# Patient Record
Sex: FEMALE | Race: WHITE | Employment: OTHER | URBAN - METROPOLITAN AREA
[De-identification: names, ages, dates, MRNs, and addresses within clinical notes are randomized per-mention and may not be internally consistent; named-entity substitution may affect disease eponyms.]

---

## 2017-11-23 ENCOUNTER — HOSPITAL ENCOUNTER (EMERGENCY)
Facility: CLINIC | Age: 57
Discharge: HOME | End: 2017-11-23
Attending: EMERGENCY MEDICINE
Payer: COMMERCIAL

## 2017-11-23 ENCOUNTER — APPOINTMENT (OUTPATIENT)
Dept: GENERAL RADIOLOGY | Facility: CLINIC | Age: 57
End: 2017-11-23
Payer: COMMERCIAL

## 2017-11-23 VITALS
BODY MASS INDEX: 27.2 KG/M2 | WEIGHT: 190 LBS | OXYGEN SATURATION: 95 % | SYSTOLIC BLOOD PRESSURE: 113 MMHG | DIASTOLIC BLOOD PRESSURE: 54 MMHG | HEIGHT: 70 IN | TEMPERATURE: 97.7 F | HEART RATE: 70 BPM | RESPIRATION RATE: 16 BRPM

## 2017-11-23 DIAGNOSIS — R07.9 CHEST PAIN, UNSPECIFIED TYPE: Primary | ICD-10-CM

## 2017-11-23 LAB
ABSOLUTE EOS #: 0.1 K/UL (ref 0–0.4)
ABSOLUTE IMMATURE GRANULOCYTE: ABNORMAL K/UL (ref 0–0.3)
ABSOLUTE LYMPH #: 0.9 K/UL (ref 1–4.8)
ABSOLUTE MONO #: 0.5 K/UL (ref 0.1–1.2)
ANION GAP SERPL CALCULATED.3IONS-SCNC: 11 MMOL/L (ref 9–17)
BASOPHILS # BLD: 1 % (ref 0–2)
BASOPHILS ABSOLUTE: 0 K/UL (ref 0–0.2)
BUN BLDV-MCNC: 11 MG/DL (ref 6–20)
BUN/CREAT BLD: ABNORMAL (ref 9–20)
CALCIUM SERPL-MCNC: 9.7 MG/DL (ref 8.6–10.4)
CHLORIDE BLD-SCNC: 104 MMOL/L (ref 98–107)
CO2: 23 MMOL/L (ref 20–31)
CREAT SERPL-MCNC: 0.6 MG/DL (ref 0.5–0.9)
D-DIMER QUANTITATIVE: 0.5 MG/L FEU
DIFFERENTIAL TYPE: ABNORMAL
EKG ATRIAL RATE: 73 BPM
EKG P AXIS: 78 DEGREES
EKG P-R INTERVAL: 138 MS
EKG Q-T INTERVAL: 392 MS
EKG QRS DURATION: 86 MS
EKG QTC CALCULATION (BAZETT): 431 MS
EKG R AXIS: 68 DEGREES
EKG T AXIS: 77 DEGREES
EKG VENTRICULAR RATE: 73 BPM
EOSINOPHILS RELATIVE PERCENT: 1 % (ref 1–4)
GFR AFRICAN AMERICAN: >60 ML/MIN
GFR NON-AFRICAN AMERICAN: >60 ML/MIN
GFR SERPL CREATININE-BSD FRML MDRD: ABNORMAL ML/MIN/{1.73_M2}
GFR SERPL CREATININE-BSD FRML MDRD: ABNORMAL ML/MIN/{1.73_M2}
GLUCOSE BLD-MCNC: 124 MG/DL (ref 70–99)
HCT VFR BLD CALC: 41 % (ref 36–46)
HEMOGLOBIN: 13.8 G/DL (ref 12–16)
IMMATURE GRANULOCYTES: ABNORMAL %
INR BLD: 0.9
LYMPHOCYTES # BLD: 15 % (ref 24–44)
MCH RBC QN AUTO: 32.6 PG (ref 26–34)
MCHC RBC AUTO-ENTMCNC: 33.7 G/DL (ref 31–37)
MCV RBC AUTO: 96.6 FL (ref 80–100)
MONOCYTES # BLD: 8 % (ref 2–11)
PARTIAL THROMBOPLASTIN TIME: 23.8 SEC (ref 21.3–31.3)
PDW BLD-RTO: 12.5 % (ref 12.5–15.4)
PLATELET # BLD: 176 K/UL (ref 140–450)
PLATELET ESTIMATE: ABNORMAL
PMV BLD AUTO: 9.1 FL (ref 6–12)
POTASSIUM SERPL-SCNC: 4.6 MMOL/L (ref 3.7–5.3)
PROTHROMBIN TIME: 9.1 SEC (ref 9.4–12.6)
RBC # BLD: 4.24 M/UL (ref 4–5.2)
RBC # BLD: ABNORMAL 10*6/UL
SEG NEUTROPHILS: 75 % (ref 36–66)
SEGMENTED NEUTROPHILS ABSOLUTE COUNT: 4.6 K/UL (ref 1.8–7.7)
SODIUM BLD-SCNC: 138 MMOL/L (ref 135–144)
TROPONIN INTERP: NORMAL
TROPONIN T: <0.03 NG/ML
WBC # BLD: 6.1 K/UL (ref 3.5–11)
WBC # BLD: ABNORMAL 10*3/UL

## 2017-11-23 PROCEDURE — 85610 PROTHROMBIN TIME: CPT

## 2017-11-23 PROCEDURE — 85730 THROMBOPLASTIN TIME PARTIAL: CPT

## 2017-11-23 PROCEDURE — 99285 EMERGENCY DEPT VISIT HI MDM: CPT

## 2017-11-23 PROCEDURE — 85025 COMPLETE CBC W/AUTO DIFF WBC: CPT

## 2017-11-23 PROCEDURE — 96372 THER/PROPH/DIAG INJ SC/IM: CPT

## 2017-11-23 PROCEDURE — 85379 FIBRIN DEGRADATION QUANT: CPT

## 2017-11-23 PROCEDURE — 36415 COLL VENOUS BLD VENIPUNCTURE: CPT

## 2017-11-23 PROCEDURE — 84484 ASSAY OF TROPONIN QUANT: CPT

## 2017-11-23 PROCEDURE — 96374 THER/PROPH/DIAG INJ IV PUSH: CPT

## 2017-11-23 PROCEDURE — 80048 BASIC METABOLIC PNL TOTAL CA: CPT

## 2017-11-23 PROCEDURE — 71010 XR CHEST PORTABLE: CPT

## 2017-11-23 PROCEDURE — 93005 ELECTROCARDIOGRAM TRACING: CPT

## 2017-11-23 PROCEDURE — 6360000002 HC RX W HCPCS: Performed by: EMERGENCY MEDICINE

## 2017-11-23 RX ORDER — KETOROLAC TROMETHAMINE 30 MG/ML
30 INJECTION, SOLUTION INTRAMUSCULAR; INTRAVENOUS ONCE
Status: COMPLETED | OUTPATIENT
Start: 2017-11-23 | End: 2017-11-23

## 2017-11-23 RX ADMIN — ENOXAPARIN SODIUM 90 MG: 100 INJECTION SUBCUTANEOUS at 13:59

## 2017-11-23 RX ADMIN — KETOROLAC TROMETHAMINE 30 MG: 30 INJECTION, SOLUTION INTRAMUSCULAR at 14:03

## 2017-11-23 ASSESSMENT — PAIN DESCRIPTION - PAIN TYPE: TYPE: ACUTE PAIN

## 2017-11-23 ASSESSMENT — PAIN SCALES - GENERAL
PAINLEVEL_OUTOF10: 7

## 2017-11-23 ASSESSMENT — PAIN DESCRIPTION - DESCRIPTORS: DESCRIPTORS: ACHING;PRESSURE

## 2017-11-23 ASSESSMENT — PAIN DESCRIPTION - ORIENTATION: ORIENTATION: LEFT

## 2017-11-23 ASSESSMENT — PAIN DESCRIPTION - LOCATION: LOCATION: CHEST

## 2017-11-23 NOTE — ED PROVIDER NOTES
[facility]  [address] [address]  Phone: [phone]      Pt Name: Israel Hidalgo  MRN: [id number]  [place] 1960  Date of evaluation: 11/23/2017      CHIEF COMPLAINT       Chief Complaint   Patient presents with    Chest Pain       HISTORY OF PRESENT ILLNESS    60-year-old female presents to the emergency department today complaining of acute onset of chest pain. Pain started in her back and radiated anteriorly to her left chest.  It was a sharp stabbing pain. Pain on a scale of 0-10 at its worst was a 7. It started acutely. She did report associated shortness of breath. She's been a little nauseated but that hasn't been assaulted related to the pain itself. No diaphoresis. She's never had pain or problems like this in the past.  She took 4 baby aspirin prior to arrival and by the time she arrives here, the pain is still present but improved. She denies any lower extremity edema or calf tenderness. No recent long car rides or plane trips anywhere. Patient does smoke and does not have any intention of quitting smoking. There is been no other evaluation or management of these symptoms right arrival.  Breathing makes the pain worse. Nothing is made better. Patient does report recently that she's come down with what she thinks is little bit of nasal drainage as well as a cough. REVIEW OF SYSTEMS     Review of Systems   All other systems reviewed and are negative. PAST MEDICAL HISTORY    has a past medical history of COPD (chronic obstructive pulmonary disease) ([address] and Depression. SURGICAL HISTORY      has a past surgical history that includes lumbar laminectomy (1991).     CURRENT MEDICATIONS       Current Discharge Medication List      CONTINUE these medications which have NOT CHANGED    Details   METFORMIN HCL PO Take by mouth      aspirin 81 MG tablet Take 81 mg by mouth daily      FLUoxetine (PROZAC) 20 MG capsule Take 80 mg by mouth daily      albuterol (PROVENTIL) (2.5 MG/3ML) 0.083% nebulizer solution Take 2.5 mg by nebulization every 6 hours as needed for Wheezing             ALLERGIES     is allergic to persantine [dipyridamole] and ceftin [cefuroxime axetil]. FAMILY HISTORY     has no family status information on file. family history is not on file. SOCIAL HISTORY      reports that she has been smoking Cigarettes. She has a 17.50 pack-year smoking history. She has never used smokeless tobacco. She reports that she does not drink alcohol or use drugs. PHYSICAL EXAM     INITIAL VITALS:  height is 5' 10\" (1.778 m) and weight is 86.2 kg (190 lb). Her oral temperature is 97.7 °F (36.5 °C). Her blood pressure is 113/54 (abnormal) and her pulse is 70. Her respiration is 16 and oxygen saturation is 95%. Physical Exam   Constitutional: She is oriented to person, place, and time. She appears well-developed and well-nourished. HENT:   Head: Normocephalic and atraumatic. Eyes: Conjunctivae are normal. Pupils are equal, round, and reactive to light. Neck: Normal range of motion. Neck supple. No tracheal deviation present. Cardiovascular: Normal rate, regular rhythm and intact distal pulses. Pulmonary/Chest: Effort normal and breath sounds normal. No respiratory distress. With slightly prolonged expiratory phase. Abdominal: Soft. Bowel sounds are normal. She exhibits no distension. There is no tenderness. Musculoskeletal: Normal range of motion. She exhibits no edema or tenderness. Lymphadenopathy:     She has no cervical adenopathy. Neurological: She is alert and oriented to person, place, and time. Skin: Skin is warm and dry. No rash noted. I did not undress this patient. Psychiatric: She has a normal mood and affect. Her behavior is normal. Thought content normal.   Vitals reviewed. DIFFERENTIAL DIAGNOSIS/ MDM:   Have initiated a cardiac workup on this patient.   I will also check a d-dimer to address the issue of pulmonary embolism and thoracic aortic dissection. DIAGNOSTIC RESULTS     EKG:  EKG as interpreted by myself as showing a normal sinus rhythm without any acute ST segment changes. Rate axis and intervals are all normal.    RADIOLOGY:   Xr Chest Portable    Result Date: 11/23/2017  EXAMINATION: SINGLE VIEW OF THE CHEST 11/23/2017 11:50 am COMPARISON: None. HISTORY: ORDERING SYSTEM PROVIDED HISTORY: cheat pain TECHNOLOGIST PROVIDED HISTORY: Reason for exam:->cheat pain Ordering Physician Provided Reason for Exam: Pt. C/o left sided chest pain and SOB. History of COPD. Acuity: Acute Type of Exam: Initial FINDINGS: The lungs are without acute focal process. There is no effusion or pneumothorax. The cardiomediastinal silhouette is without acute process. The osseous structures are without acute process. Mildly dilated loops of bowel are seen left upper quadrant. No acute process.  Mildly dilated loops of bowel in the left upper quadrant         LABS:  Results for orders placed or performed during the hospital encounter of 11/23/17   CBC auto differential   Result Value Ref Range    WBC 6.1 3.5 - 11.0 k/uL    RBC 4.24 4.0 - 5.2 m/uL    Hemoglobin 13.8 12.0 - 16.0 g/dL    Hematocrit 41.0 36 - 46 %    MCV 96.6 80 - 100 fL    MCH 32.6 26 - 34 pg    MCHC 33.7 31 - 37 g/dL    RDW 12.5 12.5 - 15.4 %    Platelets 344 311 - 970 k/uL    MPV 9.1 6.0 - 12.0 fL    Differential Type NOT REPORTED     Seg Neutrophils 75 (H) 36 - 66 %    Lymphocytes 15 (L) 24 - 44 %    Monocytes 8 2 - 11 %    Eosinophils % 1 1 - 4 %    Basophils 1 0 - 2 %    Immature Granulocytes NOT REPORTED 0 %    Segs Absolute 4.60 1.8 - 7.7 k/uL    Absolute Lymph # 0.90 (L) 1.0 - 4.8 k/uL    Absolute Mono # 0.50 0.1 - 1.2 k/uL    Absolute Eos # 0.10 0.0 - 0.4 k/uL    Basophils # 0.00 0.0 - 0.2 k/uL    Absolute Immature Granulocyte NOT REPORTED 0.00 - 0.30 k/uL    WBC Morphology NOT REPORTED     RBC Morphology NOT REPORTED     Platelet Estimate NOT REPORTED Basic metabolic panel   Result Value Ref Range    Glucose 124 (H) 70 - 99 mg/dL    BUN 11 6 - 20 mg/dL    CREATININE 0.60 0.50 - 0.90 mg/dL    Bun/Cre Ratio NOT REPORTED 9 - 20    Calcium 9.7 8.6 - 10.4 mg/dL    Sodium 138 135 - 144 mmol/L    Potassium 4.6 3.7 - 5.3 mmol/L    Chloride 104 98 - 107 mmol/L    CO2 23 20 - 31 mmol/L    Anion Gap 11 9 - 17 mmol/L    GFR Non-African American >60 >60 mL/min    GFR African American >60 >60 mL/min    GFR Comment          GFR Staging NOT REPORTED    APTT   Result Value Ref Range    PTT 23.8 21.3 - 31.3 sec   Troponin   Result Value Ref Range    Troponin T <0.03 <0.03 ng/mL    Troponin Interp         Protime-INR   Result Value Ref Range    Protime 9.1 (L) 9.4 - 12.6 sec    INR 0.9    D-Dimer, Quantitative   Result Value Ref Range    D-Dimer, Quant 0.50 mg/L FEU   EKG 12 lead   Result Value Ref Range    Ventricular Rate 73 BPM    Atrial Rate 73 BPM    P-R Interval 138 ms    QRS Duration 86 ms    Q-T Interval 392 ms    QTc Calculation (Bazett) 431 ms    P Axis 78 degrees    R Axis 68 degrees    T Axis 77 degrees       EMERGENCY DEPARTMENT COURSE:     The patient was given the following medications:  No orders of the defined types were placed in this encounter. Vitals:    Vitals:    11/23/17 1142 11/23/17 1146 11/23/17 1332 11/23/17 1333   BP: 133/75 131/62 (!) 113/54    Pulse: 76 79 69 70   Resp: 16      Temp: 97.7 °F (36.5 °C)      TempSrc: Oral      SpO2: 95%  94% 95%   Weight: 86.2 kg (190 lb)      Height: 5' 10\" (1.778 m)        -------------------------  BP: (!) 113/54, Temp: 97.7 °F (36.5 °C), Pulse: 70, Resp: 16      Re-evaluation Notes  I workup to this point is negative however she has a minimally positive d-dimer. I would like to get a CAT scan of her chest however our CT scanner is down. With this in mind I told her that I would like her to be admitted for serial EKGs and enzymes as well as a CAT scan of her chest to address the issue of pulmonary embolus. After discussing the risks and benefits of admission in the presence of 4 family members, patient states that she would like to leave. She thinks that this most likely is musculoskeletal.  I told her that certainly could be an occult be related to a recent viral illness however I cannot say for certain and there is still risk of a serious life-threatening event going on. She is able to reiterate the risks back to me. She is competent to make this decision and therefore she will sign out. She didn't told to return here immediately. I written a prescription for a CT chest to be done tomorrow. I told her accidental like to have it done today if possible. In the meantime I will anticoagulate her with Lovenox subcu. I stressed importance of close follow-up with her personal physician tomorrow. Patient will be discharged. She's been told to return here immediately with any recurrent symptoms. CONSULTS:  None    CRITICAL CARE:   None    PROCEDURES:  None    FINAL IMPRESSION      1.  Chest pain, unspecified type          DISPOSITION/PLAN   DISPOSITION Decision to Discharge    Condition on Disposition  Good    PATIENT REFERRED TO:  Fouzia Callejas CNP  [address] [place] [address] [address] [address]  [phone]    Schedule an appointment as soon as possible for a visit in 1 day        DISCHARGE MEDICATIONS:  Current Discharge Medication List          (Please note that portions of this note were completed with a voice recognition program.  Efforts were made to edit the dictations but occasionally words are mis-transcribed.)    Conner Blackman MD, F.A.C.E.P, F.A.A.E.M  Emergency Physician Attending         Conner Blackman MD  11/23/17 3274

## 2017-11-23 NOTE — ED NOTES
Pt informs RN that she prefers to go home at this time. Pt educated on risks per Dr Gigi Villa. Pt verbalized understanding.       Amada Villasenor RN  11/23/17 1691

## 2018-10-15 ENCOUNTER — APPOINTMENT (OUTPATIENT)
Dept: GENERAL RADIOLOGY | Facility: CLINIC | Age: 58
End: 2018-10-15
Payer: MEDICARE

## 2018-10-15 ENCOUNTER — HOSPITAL ENCOUNTER (EMERGENCY)
Facility: CLINIC | Age: 58
Discharge: HOME | End: 2018-10-15
Attending: EMERGENCY MEDICINE
Payer: MEDICARE

## 2018-10-15 VITALS
DIASTOLIC BLOOD PRESSURE: 95 MMHG | TEMPERATURE: 98.7 F | RESPIRATION RATE: 19 BRPM | BODY MASS INDEX: 28.93 KG/M2 | HEIGHT: 66 IN | SYSTOLIC BLOOD PRESSURE: 111 MMHG | HEART RATE: 84 BPM | WEIGHT: 180 LBS | OXYGEN SATURATION: 97 %

## 2018-10-15 DIAGNOSIS — J40 BRONCHITIS: Primary | ICD-10-CM

## 2018-10-15 PROCEDURE — 71046 X-RAY EXAM CHEST 2 VIEWS: CPT

## 2018-10-15 PROCEDURE — 99283 EMERGENCY DEPT VISIT LOW MDM: CPT

## 2018-10-15 RX ORDER — ALBUTEROL SULFATE 90 UG/1
2 AEROSOL, METERED RESPIRATORY (INHALATION) EVERY 6 HOURS PRN
Qty: 1 INHALER | Refills: 3 | Status: SHIPPED | OUTPATIENT
Start: 2018-10-15 | End: 2020-08-19 | Stop reason: SDUPTHER

## 2018-10-15 RX ORDER — PREDNISONE 10 MG/1
TABLET ORAL
Qty: 20 TABLET | Refills: 0 | Status: SHIPPED | OUTPATIENT
Start: 2018-10-15 | End: 2018-10-25

## 2018-10-15 RX ORDER — AZITHROMYCIN 250 MG/1
TABLET, FILM COATED ORAL
Qty: 1 PACKET | Refills: 0 | Status: SHIPPED | OUTPATIENT
Start: 2018-10-15 | End: 2018-10-25

## 2018-10-15 ASSESSMENT — ENCOUNTER SYMPTOMS
DIARRHEA: 0
VOMITING: 0
COUGH: 1
SHORTNESS OF BREATH: 0

## 2019-03-28 ENCOUNTER — HOSPITAL ENCOUNTER (INPATIENT)
Age: 59
LOS: 2 days | Discharge: HOME | DRG: 194 | End: 2019-03-30
Attending: EMERGENCY MEDICINE | Admitting: INTERNAL MEDICINE
Payer: MEDICARE

## 2019-03-28 ENCOUNTER — APPOINTMENT (OUTPATIENT)
Dept: GENERAL RADIOLOGY | Facility: CLINIC | Age: 59
DRG: 194 | End: 2019-03-28
Payer: MEDICARE

## 2019-03-28 DIAGNOSIS — J18.9 PNEUMONIA DUE TO ORGANISM: Primary | ICD-10-CM

## 2019-03-28 LAB
ABSOLUTE EOS #: 0 K/UL (ref 0–0.4)
ABSOLUTE IMMATURE GRANULOCYTE: ABNORMAL K/UL (ref 0–0.3)
ABSOLUTE LYMPH #: 1.2 K/UL (ref 1–4.8)
ABSOLUTE MONO #: 0.6 K/UL (ref 0.1–1.2)
ANION GAP SERPL CALCULATED.3IONS-SCNC: 14 MMOL/L (ref 9–17)
BASOPHILS # BLD: 1 % (ref 0–2)
BASOPHILS ABSOLUTE: 0 K/UL (ref 0–0.2)
BUN BLDV-MCNC: 5 MG/DL (ref 6–20)
BUN/CREAT BLD: ABNORMAL (ref 9–20)
CALCIUM SERPL-MCNC: 10.6 MG/DL (ref 8.6–10.4)
CHLORIDE BLD-SCNC: 100 MMOL/L (ref 98–107)
CO2: 26 MMOL/L (ref 20–31)
CREAT SERPL-MCNC: 0.7 MG/DL (ref 0.5–0.9)
DIFFERENTIAL TYPE: ABNORMAL
DIRECT EXAM: NORMAL
EOSINOPHILS RELATIVE PERCENT: 0 % (ref 1–4)
GFR AFRICAN AMERICAN: >60 ML/MIN
GFR NON-AFRICAN AMERICAN: >60 ML/MIN
GFR SERPL CREATININE-BSD FRML MDRD: ABNORMAL ML/MIN/{1.73_M2}
GFR SERPL CREATININE-BSD FRML MDRD: ABNORMAL ML/MIN/{1.73_M2}
GLUCOSE BLD-MCNC: 133 MG/DL (ref 70–99)
GLUCOSE BLD-MCNC: 182 MG/DL (ref 65–105)
HCT VFR BLD CALC: 33.8 % (ref 36–46)
HEMOGLOBIN: 11.4 G/DL (ref 12–16)
IMMATURE GRANULOCYTES: ABNORMAL %
LACTIC ACID: 1.2 MMOL/L (ref 0.5–2.2)
LYMPHOCYTES # BLD: 19 % (ref 24–44)
Lab: NORMAL
MAGNESIUM: 1.8 MG/DL (ref 1.6–2.6)
MCH RBC QN AUTO: 32.1 PG (ref 26–34)
MCHC RBC AUTO-ENTMCNC: 33.7 G/DL (ref 31–37)
MCV RBC AUTO: 95.2 FL (ref 80–100)
MONOCYTES # BLD: 9 % (ref 2–11)
NRBC AUTOMATED: ABNORMAL PER 100 WBC
PDW BLD-RTO: 12.7 % (ref 12.5–15.4)
PLATELET # BLD: 297 K/UL (ref 140–450)
PLATELET ESTIMATE: ABNORMAL
PMV BLD AUTO: 8.3 FL (ref 6–12)
POTASSIUM SERPL-SCNC: 3.5 MMOL/L (ref 3.7–5.3)
RBC # BLD: 3.55 M/UL (ref 4–5.2)
RBC # BLD: ABNORMAL 10*6/UL
SEG NEUTROPHILS: 71 % (ref 36–66)
SEGMENTED NEUTROPHILS ABSOLUTE COUNT: 4.6 K/UL (ref 1.8–7.7)
SODIUM BLD-SCNC: 140 MMOL/L (ref 135–144)
SPECIMEN DESCRIPTION: NORMAL
WBC # BLD: 6.5 K/UL (ref 3.5–11)
WBC # BLD: ABNORMAL 10*3/UL

## 2019-03-28 PROCEDURE — 87040 BLOOD CULTURE FOR BACTERIA: CPT

## 2019-03-28 PROCEDURE — 6360000002 HC RX W HCPCS: Performed by: EMERGENCY MEDICINE

## 2019-03-28 PROCEDURE — 71046 X-RAY EXAM CHEST 2 VIEWS: CPT

## 2019-03-28 PROCEDURE — 85025 COMPLETE CBC W/AUTO DIFF WBC: CPT

## 2019-03-28 PROCEDURE — 6370000000 HC RX 637 (ALT 250 FOR IP): Performed by: EMERGENCY MEDICINE

## 2019-03-28 PROCEDURE — 83735 ASSAY OF MAGNESIUM: CPT

## 2019-03-28 PROCEDURE — 99222 1ST HOSP IP/OBS MODERATE 55: CPT | Performed by: NURSE PRACTITIONER

## 2019-03-28 PROCEDURE — 6360000002 HC RX W HCPCS

## 2019-03-28 PROCEDURE — 82947 ASSAY GLUCOSE BLOOD QUANT: CPT

## 2019-03-28 PROCEDURE — 99285 EMERGENCY DEPT VISIT HI MDM: CPT

## 2019-03-28 PROCEDURE — 87804 INFLUENZA ASSAY W/OPTIC: CPT

## 2019-03-28 PROCEDURE — 83605 ASSAY OF LACTIC ACID: CPT

## 2019-03-28 PROCEDURE — 1200000000 HC SEMI PRIVATE

## 2019-03-28 PROCEDURE — 80048 BASIC METABOLIC PNL TOTAL CA: CPT

## 2019-03-28 PROCEDURE — 36415 COLL VENOUS BLD VENIPUNCTURE: CPT

## 2019-03-28 RX ORDER — BENZONATATE 100 MG/1
100 CAPSULE ORAL ONCE
Status: COMPLETED | OUTPATIENT
Start: 2019-03-28 | End: 2019-03-28

## 2019-03-28 RX ORDER — ONDANSETRON 2 MG/ML
4 INJECTION INTRAMUSCULAR; INTRAVENOUS EVERY 6 HOURS PRN
Status: DISCONTINUED | OUTPATIENT
Start: 2019-03-28 | End: 2019-03-30 | Stop reason: HOSPADM

## 2019-03-28 RX ORDER — ONDANSETRON 2 MG/ML
4 INJECTION INTRAMUSCULAR; INTRAVENOUS EVERY 6 HOURS PRN
Status: DISCONTINUED | OUTPATIENT
Start: 2019-03-28 | End: 2019-03-28 | Stop reason: SDUPTHER

## 2019-03-28 RX ORDER — NICOTINE POLACRILEX 4 MG
15 LOZENGE BUCCAL PRN
Status: DISCONTINUED | OUTPATIENT
Start: 2019-03-28 | End: 2019-03-30 | Stop reason: HOSPADM

## 2019-03-28 RX ORDER — SODIUM CHLORIDE 0.9 % (FLUSH) 0.9 %
10 SYRINGE (ML) INJECTION PRN
Status: DISCONTINUED | OUTPATIENT
Start: 2019-03-28 | End: 2019-03-30 | Stop reason: HOSPADM

## 2019-03-28 RX ORDER — PREDNISONE 20 MG/1
60 TABLET ORAL ONCE
Status: COMPLETED | OUTPATIENT
Start: 2019-03-28 | End: 2019-03-28

## 2019-03-28 RX ORDER — DEXTROSE MONOHYDRATE 25 G/50ML
12.5 INJECTION, SOLUTION INTRAVENOUS PRN
Status: DISCONTINUED | OUTPATIENT
Start: 2019-03-28 | End: 2019-03-30 | Stop reason: HOSPADM

## 2019-03-28 RX ORDER — PREDNISONE 20 MG/1
40 TABLET ORAL DAILY
Status: DISCONTINUED | OUTPATIENT
Start: 2019-03-31 | End: 2019-03-30 | Stop reason: HOSPADM

## 2019-03-28 RX ORDER — ONDANSETRON 4 MG/1
4 TABLET, ORALLY DISINTEGRATING ORAL EVERY 6 HOURS PRN
Status: DISCONTINUED | OUTPATIENT
Start: 2019-03-28 | End: 2019-03-30 | Stop reason: HOSPADM

## 2019-03-28 RX ORDER — FAMOTIDINE 20 MG/1
20 TABLET, FILM COATED ORAL 2 TIMES DAILY
Status: DISCONTINUED | OUTPATIENT
Start: 2019-03-28 | End: 2019-03-30 | Stop reason: HOSPADM

## 2019-03-28 RX ORDER — DEXTROSE MONOHYDRATE 50 MG/ML
100 INJECTION, SOLUTION INTRAVENOUS PRN
Status: DISCONTINUED | OUTPATIENT
Start: 2019-03-28 | End: 2019-03-30 | Stop reason: HOSPADM

## 2019-03-28 RX ORDER — ALBUTEROL SULFATE 2.5 MG/3ML
2.5 SOLUTION RESPIRATORY (INHALATION) ONCE
Status: COMPLETED | OUTPATIENT
Start: 2019-03-28 | End: 2019-03-28

## 2019-03-28 RX ORDER — ALBUTEROL SULFATE 2.5 MG/3ML
2.5 SOLUTION RESPIRATORY (INHALATION)
Status: DISCONTINUED | OUTPATIENT
Start: 2019-03-28 | End: 2019-03-30 | Stop reason: HOSPADM

## 2019-03-28 RX ORDER — FLUOXETINE HYDROCHLORIDE 20 MG/1
80 CAPSULE ORAL DAILY
Status: DISCONTINUED | OUTPATIENT
Start: 2019-03-29 | End: 2019-03-30 | Stop reason: HOSPADM

## 2019-03-28 RX ORDER — ACETAMINOPHEN 325 MG/1
650 TABLET ORAL EVERY 4 HOURS PRN
Status: DISCONTINUED | OUTPATIENT
Start: 2019-03-28 | End: 2019-03-30 | Stop reason: HOSPADM

## 2019-03-28 RX ORDER — SODIUM CHLORIDE 0.9 % (FLUSH) 0.9 %
10 SYRINGE (ML) INJECTION EVERY 12 HOURS SCHEDULED
Status: DISCONTINUED | OUTPATIENT
Start: 2019-03-28 | End: 2019-03-30 | Stop reason: HOSPADM

## 2019-03-28 RX ORDER — LEVOFLOXACIN 5 MG/ML
500 INJECTION, SOLUTION INTRAVENOUS ONCE
Status: COMPLETED | OUTPATIENT
Start: 2019-03-28 | End: 2019-03-28

## 2019-03-28 RX ORDER — ASPIRIN 81 MG/1
81 TABLET ORAL DAILY
Status: DISCONTINUED | OUTPATIENT
Start: 2019-03-29 | End: 2019-03-30 | Stop reason: HOSPADM

## 2019-03-28 RX ORDER — METHYLPREDNISOLONE SODIUM SUCCINATE 40 MG/ML
40 INJECTION, POWDER, LYOPHILIZED, FOR SOLUTION INTRAMUSCULAR; INTRAVENOUS EVERY 6 HOURS
Status: DISCONTINUED | OUTPATIENT
Start: 2019-03-29 | End: 2019-03-30 | Stop reason: HOSPADM

## 2019-03-28 RX ORDER — IPRATROPIUM BROMIDE AND ALBUTEROL SULFATE 2.5; .5 MG/3ML; MG/3ML
1 SOLUTION RESPIRATORY (INHALATION)
Status: DISCONTINUED | OUTPATIENT
Start: 2019-03-29 | End: 2019-03-30 | Stop reason: HOSPADM

## 2019-03-28 RX ORDER — LEVOFLOXACIN 5 MG/ML
500 INJECTION, SOLUTION INTRAVENOUS EVERY 24 HOURS
Status: DISCONTINUED | OUTPATIENT
Start: 2019-03-29 | End: 2019-03-30 | Stop reason: HOSPADM

## 2019-03-28 RX ORDER — IPRATROPIUM BROMIDE AND ALBUTEROL SULFATE 2.5; .5 MG/3ML; MG/3ML
1 SOLUTION RESPIRATORY (INHALATION) ONCE
Status: COMPLETED | OUTPATIENT
Start: 2019-03-28 | End: 2019-03-28

## 2019-03-28 RX ORDER — SODIUM CHLORIDE 9 MG/ML
INJECTION, SOLUTION INTRAVENOUS CONTINUOUS
Status: DISCONTINUED | OUTPATIENT
Start: 2019-03-28 | End: 2019-03-30 | Stop reason: HOSPADM

## 2019-03-28 RX ORDER — ALBUTEROL SULFATE 2.5 MG/3ML
SOLUTION RESPIRATORY (INHALATION)
Status: COMPLETED
Start: 2019-03-28 | End: 2019-03-28

## 2019-03-28 RX ORDER — NICOTINE 21 MG/24HR
1 PATCH, TRANSDERMAL 24 HOURS TRANSDERMAL DAILY PRN
Status: DISCONTINUED | OUTPATIENT
Start: 2019-03-28 | End: 2019-03-30 | Stop reason: HOSPADM

## 2019-03-28 RX ADMIN — PREDNISONE 60 MG: 20 TABLET ORAL at 18:43

## 2019-03-28 RX ADMIN — BENZONATATE 100 MG: 100 CAPSULE ORAL at 21:26

## 2019-03-28 RX ADMIN — ALBUTEROL SULFATE 2.5 MG: 2.5 SOLUTION RESPIRATORY (INHALATION) at 19:15

## 2019-03-28 RX ADMIN — LEVOFLOXACIN 500 MG: 5 INJECTION, SOLUTION INTRAVENOUS at 20:04

## 2019-03-28 RX ADMIN — IPRATROPIUM BROMIDE AND ALBUTEROL SULFATE 1 AMPULE: .5; 3 SOLUTION RESPIRATORY (INHALATION) at 18:43

## 2019-03-28 NOTE — ED PROVIDER NOTES
ATTENDING  ADDENDUM     Care of this patient was assumed from Dr. Gloria Mei. The patient was seen for Cough; Shortness of Breath; and Fatigue  . The patient's initial evaluation and plan have been discussed with the prior provider who initially evaluated the patient. Nursing Notes, Past Medical Hx, Past Surgical Hx, Social Hx, Allergies, and Family Hx were all reviewed. ED COURSE      The patient was given the following medications:  Orders Placed This Encounter   Medications    ipratropium-albuterol (DUONEB) nebulizer solution 1 ampule    predniSONE (DELTASONE) tablet 60 mg    albuterol (PROVENTIL) (2.5 MG/3ML) 0.083% nebulizer solution     Fouzia Lown: cabinet override    albuterol (PROVENTIL) nebulizer solution 2.5 mg    levofloxacin (LEVAQUIN) 500 MG/100ML infusion 500 mg       RECENT VITALS:   Temp: 98.2 °F (36.8 °C), Pulse: 79, Resp: 20, BP: (!) 192/76    MEDICAL DECISION MAKING       Xr Chest Standard (2 Vw)    Result Date: 3/28/2019  EXAMINATION: TWO VIEWS OF THE CHEST 3/28/2019 6:55 pm COMPARISON: October 2018 HISTORY: ORDERING SYSTEM PROVIDED HISTORY: cough TECHNOLOGIST PROVIDED HISTORY: cough Ordering Physician Provided Reason for Exam: cough Acuity: Acute Type of Exam: Initial FINDINGS: Heart size is stable. Ill-defined increased density in the left lung base is noted. There is no focal consolidation. There is no pneumothorax. There is no effusion. Ill-defined increased density left lung base suggesting developing pneumonia     Results for orders placed or performed during the hospital encounter of 03/28/19   Rapid Influenza A/B Antigens   Result Value Ref Range    Specimen Description . NASOPHARYNGEAL SWAB     Special Requests NOT REPORTED     Direct Exam       PRESUMPTIVE NEGATIVE for Influenza A + B antigens. PCR testing to confirm this result is available upon request.  Specimen will be saved in the laboratory for 7 days.   Please call [facility].    8:06 PM  I have discussed this patient with the hospitalist who was kind enough to admit this patient. Impression:  1.  Pneumonia due to organism      Condition:  [address] Mat[address] English MD, Mary Alice Johansen  Emergency Medicine Attending        Clare Preston MD  03/28/19 Ghanshyam Preston MD  03/28/19 2031

## 2019-03-28 NOTE — ED NOTES
Pt arrives to ER with c/o cough, shortness of breath, and nasal congestion for 11 days. Her sister is with her, who states that her family has been diagnosed with Influenza A. Harsh cough noted. Pt c/o sore throat. She states that she is short of breath with exertion. Pt speaks in 5-6 word sentences after exertion and full sentences at rest.  Pt resting in bed, warm blankets provided. Denies needs at this time, no s/s of distress.       Rajni Okeefe, RN  03/28/19 Alphonse Charles, LUIS MANUEL  03/28/19 2577

## 2019-03-28 NOTE — ED NOTES
[facility] called by dr. Valorie Campbell for admission to [facility]. Nany Ibarra RN  03/28/19 1955

## 2019-03-28 NOTE — ED NOTES
ROOM AIR O 2 88%-89% , PT PLACED BACK ON NASAL CANNULA AT 2 LPM O2 SAT 92% WITH NC.       Seng Tejeda RN  03/28/19 4132

## 2019-03-28 NOTE — ED PROVIDER NOTES
[facility]  eMERGENCY dEPARTMENT eNCOUnter      Pt Name: Amrit Martinez  MRN: [id number]  [place] 1960  Date of evaluation: 3/28/2019      CHIEF COMPLAINT       Chief Complaint   Patient presents with    Cough    Shortness of Breath    Fatigue         HISTORY OF PRESENT ILLNESS      The patient presents with about 10 days of cough and shortness of breath. She started out with fever and URI symptoms. She has been exposed to family members with influenza A. The patient has a history of COPD. She has been using her inhaler but hasn't used it for a couple days. She is not currently on steroids. She has a cough productive of green sputum. She's not sure that she had a fever recently, but had one before. Sometimes she coughs so hard that she has emesis. She's had a little diarrhea but that has gotten better. She has lost some weight, but she is able to drink fluids. She reports a decreased appetite. The patient denies chest pain. REVIEW OF SYSTEMS       All systems reviewed and negative unless noted in HPI. The patient thinks she had a fever. Denies vision change. Recent sore throat and rhinorrhea. Denies any neck pain or stiffness. Cough with green sputum. Wheezing. History of emphysema. Does not use home oxygen. Post-tussive emesis. Diarrhea recently. Denies any dysuria. Denies urinary frequency or hematuria. Denies musculoskeletal injury or pain. Denies any weakness, numbness or focal neurologic deficit. Denies any skin rash or edema. No recent psychiatric issues. No easy bruising or bleeding. Denies any polyuria, polydypsia or history of immunocompromise. PAST MEDICAL HISTORY    has a past medical history of COPD (chronic obstructive pulmonary disease) ([address] and Depression. SURGICAL HISTORY      has a past surgical history that includes lumbar laminectomy (1991).     CURRENT MEDICATIONS       Previous Medications    ALBUTEROL SULFATE HFA (VENTOLIN HFA) 108 (90 BASE) MCG/ACT INHALER    Inhale 2 puffs into the lungs every 6 hours as needed for Wheezing    ASPIRIN 81 MG TABLET    Take 81 mg by mouth daily    FLUOXETINE (PROZAC) 20 MG CAPSULE    Take 80 mg by mouth daily    METFORMIN HCL PO    Take by mouth       ALLERGIES     is allergic to persantine [dipyridamole]; ceftin [cefuroxime axetil]; and cephalosporins. FAMILY HISTORY     has no family status information on file. family history is not on file. SOCIAL HISTORY      reports that she has been smoking cigarettes. She has a 17.50 pack-year smoking history. She has never used smokeless tobacco. She reports that she does not drink alcohol or use drugs. PHYSICAL EXAM     INITIAL VITALS:  height is 5' 10\" (1.778 m) and weight is 81.6 kg (180 lb). Her oral temperature is 98.2 °F (36.8 °C). Her blood pressure is 130/56 (abnormal) and her pulse is 72. Her respiration is 20 and oxygen saturation is 96%. Patient is alert and oriented, in no apparent distress. HEENT is atraumatic. Pupils are PERRL at 4 mm. Mucous membranes moist.    Neck is supple with no lymphadenopathy. No JVD. No meningismus. Heart sounds regular rate and rhythm with no gallops, murmurs, or rubs. Lungs:  Wheezing in all fields. Abdomen: soft, nontender with no pain to palpation. Normal bowel sounds are noted. No rebound or guarding. Musculoskeletal exam shows no evidence of trauma. Skin: no rash or edema. Neurological exam reveals cranial nerves 2 through 12 grossly intact. Patient has equal  and normal deep tendon reflexes. Psychiatric: appropriate. Lymphatics.:  No lymphadenopathy.        DIFFERENTIAL DIAGNOSIS/ MDM:     COPD exacerbation, pneumonia, influenza A    DIAGNOSTIC RESULTS         RADIOLOGY:   I directly visualized the following  images and reviewed the radiologist interpretations:  XR CHEST STANDARD (2 VW)    (Results Pending)       LABS:  Results for orders placed or performed during the hospital encounter of 03/28/19   Rapid Influenza A/B Antigens   Result Value Ref Range    Specimen Description . NASOPHARYNGEAL SWAB     Special Requests NOT REPORTED     Direct Exam       PRESUMPTIVE NEGATIVE for Influenza A + B antigens. PCR testing to confirm this result is available upon request.  Specimen will be saved in the laboratory for 7 days. Please call [phone] if PCR testing is indicated. EMERGENCY DEPARTMENT COURSE:   Vitals:    Vitals:    03/28/19 1826 03/28/19 1854   BP: (!) 130/56    Pulse: 72    Resp: 20 20   Temp: 98.2 °F (36.8 °C)    TempSrc: Oral    SpO2: 94% 96%   Weight: 81.6 kg (180 lb)    Height: 5' 10\" (1.778 m)      -------------------------  BP: (!) 130/56, Temp: 98.2 °F (36.8 °C), Pulse: 72, Resp: 20      Re-evaluation Notes    The patient received steroids and a breathing treatment in the emergency department. The patient will be endorsed to Dr. Chinyere Hilton secondary to end of shift. Please refer to his documentation. FINAL IMPRESSION    No diagnosis found. DISPOSITION/PLAN   DISPOSITION        Condition on Disposition    Stable    PATIENT REFERRED TO:  No follow-up provider specified.     DISCHARGE MEDICATIONS:  New Prescriptions    No medications on file       (Please note that portions of this note were completed with a voice recognition program.  Efforts were made to edit the dictations but occasionally words are mis-transcribed.)    Randolph MD   Attending Emergency Physician       Yany Bartlett MD  03/28/19 6353

## 2019-03-29 ENCOUNTER — APPOINTMENT (OUTPATIENT)
Dept: CT IMAGING | Age: 59
DRG: 194 | End: 2019-03-29
Payer: MEDICARE

## 2019-03-29 ENCOUNTER — APPOINTMENT (OUTPATIENT)
Dept: GENERAL RADIOLOGY | Age: 59
DRG: 194 | End: 2019-03-29
Payer: MEDICARE

## 2019-03-29 PROBLEM — E83.52 HYPERCALCEMIA: Status: ACTIVE | Noted: 2019-03-29

## 2019-03-29 PROBLEM — J18.9 PNEUMONIA: Status: RESOLVED | Noted: 2019-03-28 | Resolved: 2019-03-29

## 2019-03-29 LAB
ADENOVIRUS PCR: NOT DETECTED
ANION GAP SERPL CALCULATED.3IONS-SCNC: 13 MMOL/L (ref 9–17)
BORDETELLA PERTUSSIS PCR: NOT DETECTED
BUN BLDV-MCNC: 8 MG/DL (ref 6–20)
BUN/CREAT BLD: 14 (ref 9–20)
CALCIUM SERPL-MCNC: 10.6 MG/DL (ref 8.6–10.4)
CHLAMYDIA PNEUMONIAE BY PCR: NOT DETECTED
CHLORIDE BLD-SCNC: 99 MMOL/L (ref 98–107)
CO2: 24 MMOL/L (ref 20–31)
CORONAVIRUS 229E PCR: NOT DETECTED
CORONAVIRUS HKU1 PCR: NOT DETECTED
CORONAVIRUS NL63 PCR: NOT DETECTED
CORONAVIRUS OC43 PCR: NOT DETECTED
CREAT SERPL-MCNC: 0.58 MG/DL (ref 0.5–0.9)
ESTIMATED AVERAGE GLUCOSE: 128 MG/DL
GFR AFRICAN AMERICAN: >60 ML/MIN
GFR NON-AFRICAN AMERICAN: >60 ML/MIN
GFR SERPL CREATININE-BSD FRML MDRD: ABNORMAL ML/MIN/{1.73_M2}
GFR SERPL CREATININE-BSD FRML MDRD: ABNORMAL ML/MIN/{1.73_M2}
GLUCOSE BLD-MCNC: 175 MG/DL (ref 65–105)
GLUCOSE BLD-MCNC: 204 MG/DL (ref 70–99)
GLUCOSE BLD-MCNC: 233 MG/DL (ref 65–105)
GLUCOSE BLD-MCNC: 258 MG/DL (ref 65–105)
GLUCOSE BLD-MCNC: 264 MG/DL (ref 65–105)
HBA1C MFR BLD: 6.1 % (ref 4–6)
HCT VFR BLD CALC: 35.9 % (ref 36–46)
HEMOGLOBIN: 12 G/DL (ref 12–16)
HUMAN METAPNEUMOVIRUS PCR: NOT DETECTED
INFLUENZA A BY PCR: NOT DETECTED
INFLUENZA A H1 (2009) PCR: NORMAL
INFLUENZA A H1 PCR: NORMAL
INFLUENZA A H3 PCR: NORMAL
INFLUENZA B BY PCR: NOT DETECTED
MCH RBC QN AUTO: 32 PG (ref 26–34)
MCHC RBC AUTO-ENTMCNC: 33.4 G/DL (ref 31–37)
MCV RBC AUTO: 95.8 FL (ref 80–100)
MYCOPLASMA PNEUMONIAE PCR: NOT DETECTED
NRBC AUTOMATED: ABNORMAL PER 100 WBC
PARAINFLUENZA 1 PCR: NOT DETECTED
PARAINFLUENZA 2 PCR: NOT DETECTED
PARAINFLUENZA 3 PCR: NOT DETECTED
PARAINFLUENZA 4 PCR: NOT DETECTED
PDW BLD-RTO: 12.7 % (ref 11.5–14.5)
PLATELET # BLD: 313 K/UL (ref 130–400)
PMV BLD AUTO: 8.9 FL (ref 6–12)
POTASSIUM SERPL-SCNC: 4.5 MMOL/L (ref 3.7–5.3)
RBC # BLD: 3.75 M/UL (ref 4–5.2)
RESP SYNCYTIAL VIRUS PCR: NOT DETECTED
RHINO/ENTEROVIRUS PCR: NOT DETECTED
SODIUM BLD-SCNC: 136 MMOL/L (ref 135–144)
SPECIMEN DESCRIPTION: NORMAL
WBC # BLD: 5.6 K/UL (ref 3.5–11)

## 2019-03-29 PROCEDURE — 87633 RESP VIRUS 12-25 TARGETS: CPT

## 2019-03-29 PROCEDURE — 6370000000 HC RX 637 (ALT 250 FOR IP): Performed by: NURSE PRACTITIONER

## 2019-03-29 PROCEDURE — 97116 GAIT TRAINING THERAPY: CPT

## 2019-03-29 PROCEDURE — 71260 CT THORAX DX C+: CPT

## 2019-03-29 PROCEDURE — 71045 X-RAY EXAM CHEST 1 VIEW: CPT

## 2019-03-29 PROCEDURE — 6360000004 HC RX CONTRAST MEDICATION: Performed by: INTERNAL MEDICINE

## 2019-03-29 PROCEDURE — 36415 COLL VENOUS BLD VENIPUNCTURE: CPT

## 2019-03-29 PROCEDURE — 83036 HEMOGLOBIN GLYCOSYLATED A1C: CPT

## 2019-03-29 PROCEDURE — 2580000003 HC RX 258: Performed by: INTERNAL MEDICINE

## 2019-03-29 PROCEDURE — 87486 CHLMYD PNEUM DNA AMP PROBE: CPT

## 2019-03-29 PROCEDURE — 87798 DETECT AGENT NOS DNA AMP: CPT

## 2019-03-29 PROCEDURE — 82947 ASSAY GLUCOSE BLOOD QUANT: CPT

## 2019-03-29 PROCEDURE — 97530 THERAPEUTIC ACTIVITIES: CPT

## 2019-03-29 PROCEDURE — 1200000000 HC SEMI PRIVATE

## 2019-03-29 PROCEDURE — 85027 COMPLETE CBC AUTOMATED: CPT

## 2019-03-29 PROCEDURE — 80048 BASIC METABOLIC PNL TOTAL CA: CPT

## 2019-03-29 PROCEDURE — 6360000002 HC RX W HCPCS: Performed by: NURSE PRACTITIONER

## 2019-03-29 PROCEDURE — 94640 AIRWAY INHALATION TREATMENT: CPT

## 2019-03-29 PROCEDURE — 97161 PT EVAL LOW COMPLEX 20 MIN: CPT

## 2019-03-29 PROCEDURE — 2580000003 HC RX 258: Performed by: NURSE PRACTITIONER

## 2019-03-29 PROCEDURE — 99232 SBSQ HOSP IP/OBS MODERATE 35: CPT | Performed by: INTERNAL MEDICINE

## 2019-03-29 PROCEDURE — 87581 M.PNEUMON DNA AMP PROBE: CPT

## 2019-03-29 RX ORDER — SODIUM CHLORIDE 0.9 % (FLUSH) 0.9 %
10 SYRINGE (ML) INJECTION
Status: COMPLETED | OUTPATIENT
Start: 2019-03-29 | End: 2019-03-29

## 2019-03-29 RX ORDER — 0.9 % SODIUM CHLORIDE 0.9 %
80 INTRAVENOUS SOLUTION INTRAVENOUS ONCE
Status: COMPLETED | OUTPATIENT
Start: 2019-03-29 | End: 2019-03-29

## 2019-03-29 RX ADMIN — SODIUM CHLORIDE: 9 INJECTION, SOLUTION INTRAVENOUS at 13:00

## 2019-03-29 RX ADMIN — METHYLPREDNISOLONE SODIUM SUCCINATE 40 MG: 40 INJECTION, POWDER, FOR SOLUTION INTRAMUSCULAR; INTRAVENOUS at 06:00

## 2019-03-29 RX ADMIN — IPRATROPIUM BROMIDE AND ALBUTEROL SULFATE 1 AMPULE: .5; 3 SOLUTION RESPIRATORY (INHALATION) at 11:06

## 2019-03-29 RX ADMIN — IPRATROPIUM BROMIDE AND ALBUTEROL SULFATE 1 AMPULE: .5; 3 SOLUTION RESPIRATORY (INHALATION) at 18:28

## 2019-03-29 RX ADMIN — SODIUM CHLORIDE 80 ML: 0.9 INJECTION, SOLUTION INTRAVENOUS at 17:23

## 2019-03-29 RX ADMIN — INSULIN LISPRO 2 UNITS: 100 INJECTION, SOLUTION INTRAVENOUS; SUBCUTANEOUS at 08:18

## 2019-03-29 RX ADMIN — INSULIN LISPRO 1 UNITS: 100 INJECTION, SOLUTION INTRAVENOUS; SUBCUTANEOUS at 22:32

## 2019-03-29 RX ADMIN — INSULIN LISPRO 3 UNITS: 100 INJECTION, SOLUTION INTRAVENOUS; SUBCUTANEOUS at 11:19

## 2019-03-29 RX ADMIN — IOPAMIDOL 75 ML: 755 INJECTION, SOLUTION INTRAVENOUS at 17:22

## 2019-03-29 RX ADMIN — INSULIN LISPRO 3 UNITS: 100 INJECTION, SOLUTION INTRAVENOUS; SUBCUTANEOUS at 17:23

## 2019-03-29 RX ADMIN — FLUOXETINE 80 MG: 20 CAPSULE ORAL at 08:18

## 2019-03-29 RX ADMIN — Medication 10 ML: at 00:25

## 2019-03-29 RX ADMIN — IPRATROPIUM BROMIDE AND ALBUTEROL SULFATE 1 AMPULE: .5; 3 SOLUTION RESPIRATORY (INHALATION) at 14:51

## 2019-03-29 RX ADMIN — METHYLPREDNISOLONE SODIUM SUCCINATE 40 MG: 40 INJECTION, POWDER, FOR SOLUTION INTRAMUSCULAR; INTRAVENOUS at 11:19

## 2019-03-29 RX ADMIN — INSULIN LISPRO 1 UNITS: 100 INJECTION, SOLUTION INTRAVENOUS; SUBCUTANEOUS at 00:26

## 2019-03-29 RX ADMIN — LEVOFLOXACIN 500 MG: 5 INJECTION, SOLUTION INTRAVENOUS at 21:03

## 2019-03-29 RX ADMIN — FAMOTIDINE 20 MG: 20 TABLET ORAL at 08:18

## 2019-03-29 RX ADMIN — SODIUM CHLORIDE: 9 INJECTION, SOLUTION INTRAVENOUS at 00:26

## 2019-03-29 RX ADMIN — IPRATROPIUM BROMIDE AND ALBUTEROL SULFATE 1 AMPULE: .5; 3 SOLUTION RESPIRATORY (INHALATION) at 06:11

## 2019-03-29 RX ADMIN — ENOXAPARIN SODIUM 40 MG: 40 INJECTION SUBCUTANEOUS at 08:18

## 2019-03-29 RX ADMIN — ASPIRIN 81 MG: 81 TABLET, COATED ORAL at 08:18

## 2019-03-29 RX ADMIN — METHYLPREDNISOLONE SODIUM SUCCINATE 40 MG: 40 INJECTION, POWDER, FOR SOLUTION INTRAMUSCULAR; INTRAVENOUS at 17:24

## 2019-03-29 RX ADMIN — SODIUM CHLORIDE, PRESERVATIVE FREE 10 ML: 5 INJECTION INTRAVENOUS at 17:23

## 2019-03-29 RX ADMIN — METHYLPREDNISOLONE SODIUM SUCCINATE 40 MG: 40 INJECTION, POWDER, FOR SOLUTION INTRAMUSCULAR; INTRAVENOUS at 00:26

## 2019-03-29 RX ADMIN — METHYLPREDNISOLONE SODIUM SUCCINATE 40 MG: 40 INJECTION, POWDER, FOR SOLUTION INTRAMUSCULAR; INTRAVENOUS at 23:43

## 2019-03-29 RX ADMIN — FAMOTIDINE 20 MG: 20 TABLET ORAL at 20:13

## 2019-03-29 ASSESSMENT — PAIN SCALES - GENERAL
PAINLEVEL_OUTOF10: 0
PAINLEVEL_OUTOF10: 0

## 2019-03-29 NOTE — PLAN OF CARE
Problem: Falls - Risk of:  Goal: Will remain free from falls  Description  Will remain free from falls  Outcome: Met This Shift     Problem: Breathing Pattern - Ineffective:  Goal: Ability to achieve and maintain a regular respiratory rate will improve  Description  Ability to achieve and maintain a regular respiratory rate will improve  3/29/2019 1517 by Dominic Palafox RN  Outcome: Ongoing  Note:   Unlabored at rest  Dyspnea with exertion  IS & cough and deep breathe encouraged

## 2019-03-29 NOTE — FLOWSHEET NOTE
Patient expresses no spiritual/emotional needs at this time. Spiritual Care will follow as needed.   (Writer charting for [employer], on-call/contingent )     03/29/19 1517   Encounter Summary   Services provided to: Patient and family together   Referral/Consult From: Rounding   Continue Visiting   (3/29/19)   Complexity of Encounter Low   Length of Encounter 15 minutes   Spiritual Assessment Completed Yes   Routine   Type Initial

## 2019-03-29 NOTE — PLAN OF CARE
Problem: Breathing Pattern - Ineffective:  Goal: Ability to achieve and maintain a regular respiratory rate will improve  Description  Ability to achieve and maintain a regular respiratory rate will improve  Outcome: Ongoing  Note:   Patient breathing is unlabored. Dyspnea with exertion     Problem: Airway Clearance - Ineffective:  Goal: Ability to maintain a clear airway will improve  Description  Ability to maintain a clear airway will improve  Outcome: Ongoing  Note:   Patient has diminished lung sounds with some wheezing. Has prn breathing treatments.       Problem: Gas Exchange - Impaired:  Goal: Levels of oxygenation will improve  Description  Levels of oxygenation will improve  Outcome: Ongoing  Note:   Patient on O2 NC prn to remain O2 sats above 92%

## 2019-03-29 NOTE — PROGRESS NOTES
Eyes: Negative for blurred vision, double vision, photophobia and pain. Respiratory: Negative for cough, hemoptysis, sputum production, shortness of breath and wheezing. Cardiovascular: Negative for palpitations, orthopnea, claudication, leg swelling and PND. Gastrointestinal: Negative for abdominal pain, blood in stool, constipation, diarrhea, heartburn, melena, nausea and vomiting. Genitourinary: Negative for dysuria, flank pain, frequency, hematuria and urgency. Musculoskeletal: Negative for back pain, falls, joint pain, myalgias and neck pain. Skin: Negative for itching and rash. Neurological: Negative for dizziness, tingling, tremors, sensory change, focal weakness, seizures, weakness and headaches. Endo/Heme/Allergies: Does not bruise/bleed easily. Psychiatric/Behavioral: Negative for depression. The patient is not nervous/anxious. Medications: Allergies:    Allergies   Allergen Reactions    Persantine [Dipyridamole] Hives    Ceftin [Cefuroxime Axetil]      Pt unable to recall type of reaction she had from ceftin    Cephalosporins     Penicillins        Current Meds:    aspirin  81 mg Oral Daily    FLUoxetine  80 mg Oral Daily    sodium chloride flush  10 mL Intravenous 2 times per day    famotidine  20 mg Oral BID    enoxaparin  40 mg Subcutaneous Daily    ipratropium-albuterol  1 ampule Inhalation Q4H WA    methylPREDNISolone  40 mg Intravenous Q6H    Followed by   Janes Dhillon ON 3/31/2019] predniSONE  40 mg Oral Daily    insulin lispro  0-6 Units Subcutaneous TID     insulin lispro  0-3 Units Subcutaneous Nightly    levofloxacin  500 mg Intravenous Q24H     PRN Meds:     sodium chloride flush 10 mL Intravenous PRN   magnesium hydroxide 30 mL Oral Daily PRN   nicotine 1 patch Transdermal Daily PRN   albuterol 2.5 mg Nebulization Q2H PRN   acetaminophen 650 mg Oral Q4H PRN   glucose 15 g Oral PRN   dextrose 12.5 g Intravenous PRN   glucagon (rDNA) 1 mg Intramuscular PRN   dextrose 100 mL/hr Intravenous PRN   ondansetron 4 mg Oral Q6H PRN   Or      ondansetron 4 mg Intravenous Q6H PRN       Data:     Code Status:  Full Code     XR Chest Standard (2 Vw)  Result Date: 3/28/2019  COMPARISON: October 2018\FINDINGS: Heart size is stable. Ill-defined increased density in the left lung base is noted. There is no focal consolidation. There is no pneumothorax. There is no effusion. Impression:  Ill-defined increased density left lung base suggesting developing pneumonia     XR Chest Portable  Result Date: 3/29/2019  COMPARISON: March 28, 2018\FINDINGS: Shallow inflation. Relative elevation of the left hemidiaphragm is again noted. The cardiac mediastinal contours are unchanged in appearance. Basilar linear opacities are again noted without significant interval change. No new airspace disease, pneumothorax or effusion. No acute osseous abnormality identified. Impression:  No appreciable change in basilar interstitial opacities. While this in part may represent atelectasis, developing infiltrate should be considered in the appropriate clinical setting. Labs:    Hematology:  Recent Labs     03/28/19 1955 03/29/19  0504   WBC 6.5 5.6   RBC 3.55* 3.75*   HGB 11.4* 12.0   HCT 33.8* 35.9*   MCV 95.2 95.8   MCH 32.1 32.0   MCHC 33.7 33.4   RDW 12.7 12.7    313   MPV 8.3 8.9     Chemistry:  Recent Labs     03/28/19 1955 03/29/19  0504    136   K 3.5* 4.5    99   CO2 26 24   GLUCOSE 133* 204*   BUN 5* 8   CREATININE 0.70 0.58   MG 1.8  --    ANIONGAP 14 13   LABGLOM >60 >60   GFRAA >60 >60   CALCIUM 10.6* 10.6*   LACTA 1.2  --      Recent Labs     03/29/19  0504        Glucose:  Recent Labs     03/28/19  2321 03/29/19  0504 03/29/19  0716 03/29/19  1109   LABA1C  --  6.1*  --   --    POCGLU 182*  --  233* 264*     Viral PCR panel  Specimen Description . NASOPHARYNGEAL SWAB   Final 03/29/2019  3:20 AM   Adenovirus PCR Not Detected  Not Detected Final 03/29/2019  3:20 AM   Coronavirus 229E PCR Not Detected  Not Detected Final 03/29/2019  3:20 AM   Coronavirus HKU1 PCR Not Detected  Not Detected Final 03/29/2019  3:20 AM   Coronavirus NL63 PCR Not Detected  Not Detected Final 03/29/2019  3:20 AM   Coronavirus OC43 PCR Not Detected  Not Detected Final 03/29/2019  3:20 AM   Human Metapneumovirus PCR Not Detected  Not Detected Final 03/29/2019  3:20 AM   Rhino/Enterovirus PCR Not Detected  Not Detected Final 03/29/2019  3:20 AM   Influenza A by PCR Not Detected  Not Detected Final 03/29/2019  3:20 AM   Influenza A H1 PCR NOT REPORTED  Not Detected Final 03/29/2019  3:20 AM   Influenza A H1 (2009) PCR NOT REPORTED  Not Detected Final 03/29/2019  3:20 AM   Influenza A H3 PCR NOT REPORTED  Not Detected Final 03/29/2019  3:20 AM   Influenza B by PCR Not Detected  Not Detected Final 03/29/2019  3:20 AM   Parainfluenza 1 PCR Not Detected  Not Detected Final 03/29/2019  3:20 AM   Parainfluenza 2 PCR Not Detected  Not Detected Final 03/29/2019  3:20 AM   Parainfluenza 3 PCR Not Detected  Not Detected Final 03/29/2019  3:20 AM   Parainfluenza 4 PCR Not Detected  Not Detected Final 03/29/2019  3:20 AM   Resp Syncytial Virus PCR Not Detected  Not Detected Final 03/29/2019  3:20 AM   B Pertussis by PCR Not Detected  Not Detected Final 03/29/2019  3:20 AM   Chlamydia pneumoniae By PCR Not Detected  Not Detected Final 03/29/2019  3:20 AM   Mycoplasma pneumo by PCR Not Detected  Not Detected Final 03/29/2019  3:20 AM         Physical Examination:    BP (!) 122/50   Pulse 66   Temp 97.7 °F (36.5 °C) (Oral)   Resp 16   Ht 5' 10\" (1.778 m)   Wt 191 lb 3.2 oz (86.7 kg)   SpO2 90%   BMI 27.43 kg/m²     Intake/Output Summary (Last 24 hours) at 3/29/2019 1417  Last data filed at 3/29/2019 6188  Gross per 24 hour   Intake 418 ml   Output 750 ml   Net -332 ml       General Appearance:    Alert, cooperative, minimal distress, appears stated age   Head:    Normocephalic, without obvious abnormality, atraumatic   Eyes:    PERRL, conjunctiva/corneas clear, EOM's intact        Ears:    Normal external ear canals, both ears   Nose:   Nares normal, septum midline, mucosa normal, no drainage    or sinus tenderness   Throat:   Lips, mucosa, and tongue normal   Neck:   Supple, symmetrical, trachea midline, no carotid    bruit or JVD   Back:     Symmetric, no curvature, ROM normal, no CVA tenderness   Lungs:     Clear to auscultation bilaterally, minimal rhonchi in left base, respirations unlabored   Chest wall:    No tenderness or deformity   Heart:    Regular rate and rhythm, S1 and S2 normal, no murmur, rub   or gallop   Abdomen:     Soft, non-tender, bowel sounds active all four quadrants,     no masses, no organomegaly   Extremities:   Extremities normal, atraumatic, no cyanosis or edema   Pulses:   2+ and symmetric all extremities   Skin:   Skin color, texture, turgor normal, no rashes or lesions   Lymph nodes:   Cervical, supraclavicular, and axillary nodes normal   Neurologic:   CNII-XII intact. Normal strength, sensation and reflexes       throughout       Assessment:     Primary Problem  Pneumonia of left lower lobe due to infectious organism [facility])     Active Hospital Problems    Diagnosis Date Noted    Hypercalcemia [E83.52] 03/29/2019    Pneumonia of left lower lobe due to infectious organism ([facility] [address] [J18.1]     COPD (chronic obstructive pulmonary disease) ([facility] [address] [J44.9]     Tobacco abuse [Z72.0]      Past Medical History:   Diagnosis Date    COPD (chronic obstructive pulmonary disease) ([address]     Depression     Diabetes mellitus ([facility] [address]     Pneumonia         Consultations:     IP CONSULT TO DIETITIAN    Plan:     1. Continue Levaquin  2. Continue Solu-Medrol 40 mg every 6 hours  3. Continue aerosol treatments  4. DVT prophylaxis  5. GI prophylaxis  6. Nicotine patch when necessary  7. Recheck laboratories in the a.m.       Electronically signed by Kristy Sun DO on 3/29/2019 at

## 2019-03-29 NOTE — ED NOTES
Pt. Updated on poc for admission. Pt. Agreeable to [facility]. Dr. Hiral Cabrales also reviewed with pt. She has pneumonia and needs iv antibiotics and labs. Pt. Agreeable to poc.      Dean Chavez RN  03/28/19 2000

## 2019-03-29 NOTE — H&P
[facility]    HISTORY AND PHYSICAL EXAMINATION            Date:   3/28/2019  Patient name:  Molina Villagran  Date of admission:  3/28/2019  6:18 PM  MRN:   [id number]  Account:  [de-identified]  YOB: 1960  PCP:    No primary care provider on file. Room:   2004/2004-02  Code Status:    Full Code    Chief Complaint:     Chief Complaint   Patient presents with    Cough    Shortness of Breath    Fatigue     History Obtained From:     Patient in electronic medical record. History of Present Illness: The patient is a 63 y.o.  female who presents to the hospital with complaint of shortness of breath, cough, and fatigue. She states her symptoms started 11 days ago and worsened last night when she woke up gasping for air. She endorses a nonproductive cough and shortness of breath that worsens with activity but persists at rest. The patient has used her rescue inhaler with little relief. She has associated fatigue and decreased appetite. Her family states she has had a weight loss of >10lbs over the last month. She has been around several family members recently that have been diagnosed with influenza. She denies fever, chills, nausea or vomiting. She has a previous history of COPD and diabetes, but states she no longer takes her metformin. She reports her blood sugar is diet controlled. Rapid influenza screen negative. Chest x-ray indicates left lower lung pneumonia. Past Medical History:     Past Medical History:   Diagnosis Date    [facility] obstructive [facility]) [facility]     Depression     Diabetes mellitus ([facility] [facility]     Pneumonia         Past Surgical History:     Past Surgical History:   Procedure Laterality Date    LUMBAR LAMINECTOMY  1991        Medications Prior to Admission:     Prior to Admission medications    Medication Sig Start Date End Date Taking?  Authorizing Provider   albuterol sulfate HFA (VENTOLIN HFA) 108 (90 Base) MCG/ACT inhaler Inhale 2 puffs into the lungs every 6 hours as needed for Wheezing 10/15/18  Yes Terri Rodriguez MD   aspirin 81 MG tablet Take 81 mg by mouth daily   Yes Historical Provider, MD   FLUoxetine (PROZAC) 20 MG capsule Take 80 mg by mouth daily   Yes Historical Provider, MD   METFORMIN HCL PO Take by mouth    Historical Provider, MD        Allergies:     Persantine [dipyridamole]; Ceftin [cefuroxime axetil]; Cephalosporins; and Penicillins    Social History:     Tobacco:    reports that she has been smoking cigarettes. She has a 17.50 pack-year smoking history. She has never used smokeless tobacco.  Alcohol:      reports that she does not drink alcohol. Drug Use:  reports that she does not use drugs. Family History:     Family History   Problem Relation Age of Onset    Heart Disease Mother     Diabetes Mother     Heart Disease Father     Diabetes Father     Diabetes Sister     Hypertension Sister        Review of Systems:     Positive and Negative as described in HPI. CONSTITUTIONAL:  Negative for fevers, chills, sweats, positive for fatigue, weight loss. HEENT:  Negative for vision, hearing changes, runny nose, throat pain. RESPIRATORY:  Positive for shortness of breath, cough, negative for congestion, wheezing. CARDIOVASCULAR:  Negative for chest pain, palpitations. GASTROINTESTINAL:  Negative for nausea, vomiting, diarrhea, constipation, change in bowel habits, abdominal pain. GENITOURINARY:  Negative for difficulty of urination, burning with urination, frequency. INTEGUMENT:  Negative for rash, skin lesions, easy bruising. HEMATOLOGIC/LYMPHATIC:  Negative for swelling/edema. ALLERGIC/IMMUNOLOGIC:  Negative for urticaria, itching. ENDOCRINE:  Negative increase in drinking, increase in urination, hot or cold intolerance. MUSCULOSKELETAL: Negative joint pains, muscle aches, swelling of joints.   NEUROLOGICAL: Negative for headaches, dizziness, - 2.6 mg/dL   POC Glucose Fingerstick    Collection Time: 03/28/19 11:21 PM   Result Value Ref Range    POC Glucose 182 (H) 65 - 105 mg/dL       Imaging/Diagnostics:    EXAMINATION: TWO VIEWS OF THE CHEST   3/28/2019 6:55 pm   COMPARISON: October 2018     Ill-defined increased density left lung base suggesting developing pneumonia     Assessment :      Primary Problem  Pneumonia of left lower lobe due to infectious organism [facility])    Active Hospital Problems    Diagnosis Date Noted    [facility] to [facility] [J18.1]     COPD (chronic obstructive pulmonary disease) ([facility] [J44.9]     Tobacco abuse [Z72.0]      Plan:     Patient status Admit as inpatient to the medical surgical unit. 1. Check viral respiratory PCR. 2. Droplet precautions. 3. Supplemental oxygen as needed. 4. IV antibiotics. 5. IV solumedrol. 6. Monitor and control blood glucose. 7. Monitor vitals. 8. Monitor labs. 9. Nebulizer treatments. 10. DVT and GI prophylaxis. 11. Dietician consult. 12. Dental soft diet. 13. Activity as tolerated with assist, maintain spo2 >92%. Plan of care discussed with patient, patient's family and primary RN. Consultations:   None    Patient is admitted as inpatient status because of co-morbidities listed above, severity of signs and symptoms as outlined, requirement for current medical therapies and most importantly because of direct risk to patient if care not provided in a hospital setting. ILA Self CNP  3/28/2019  11:45 PM    Copy sent to Dr. Julee Vanegas primary care provider on file.

## 2019-03-29 NOTE — PROGRESS NOTES
Nutrition Assessment    Type and Reason for Visit: Consult, Initial, Positive Nutrition Screen    Nutrition Recommendations: 1. Suggest modifying dental soft diet to include 4 Carbohydrate choices/meal.     Nutrition Assessment: Patient is at risk for malnutrition due to patient report of losing 18lbs (9% in three weeks). However, per EHR weight has increased in the past 5 months. Patient also reports poor appetite (<75% intake past few weeks) secondary to missing teeth and needing dentures. Suggest modifying dental soft diet to include 4 Carbohydrate choices/meal due to hx of DM. Writer offered diabetic oral nutrition supplement (Glucerna) as she does not like milk related products. Malnutrition Assessment:  · Malnutrition Status: At risk for malnutrition  · Context: Acute illness or injury  · Findings of the 6 clinical characteristics of malnutrition (Minimum of 2 out of 6 clinical characteristics is required to make the diagnosis of moderate or severe Protein Calorie Malnutrition based on AND/ASPEN Guidelines):  1. Energy Intake-Unable to assess, Unable to assess    2. Weight Loss-No significant weight loss, unable to assess  3. Fat Loss-No significant subcutaneous fat loss,    4. Muscle Loss-No significant muscle mass loss,    5. Fluid Accumulation-No significant fluid accumulation,    6.  Strength-Not measured    Nutrition Risk Level:  Moderate    Nutrient Needs:  · Estimated Daily Total Kcal: 3595-8475 (21-22 kcal/kg)  · Estimated Daily Protein (g): 87-95 (1.0-1.2 g/kg)  · Estimated Daily Total Fluid (ml/day): 6296-1507 (1mL/kcal)    Nutrition Diagnosis:   · Problem: Inadequate oral intake  · Etiology: related to Impaired respiratory function-inability to consume food, Partial or complete edentulism(pneumonia)     Signs and symptoms:  as evidenced by Ill-fitting, broken, or lack of dentures, Patient report of, Diet history of poor intake, Weight loss    Objective Information:  · Nutrition-Focused Physical Findings: GI: Abdomen soft; rounded; active bowel sounds  PV/SKIN: WDL   · Wound Type: None  · Current Nutrition Therapies:  · Oral Diet Orders: Dental Soft   · Oral Diet intake: 1-25%  · Oral Nutrition Supplement (ONS) Orders: None  · ONS intake: Unable to assess  · Anthropometric Measures:  · Ht: 5' 10\" (177.8 cm)   · Current Body Wt: 191 lb 3.2 oz (86.7 kg)  · Admission Body Wt: 191 lb (86.6 kg)  · Usual Body Wt: 209 lb (94.8 kg)(per patient report )  · % Weight Change:  ,  8.6%  · Ideal Body Wt: 150 lb (68 kg), % Ideal Body 127%  · BMI Classification: BMI 25.0 - 29.9 Overweight    Nutrition Interventions:   Modify current diet  Continued Inpatient Monitoring, Coordination of Care    Nutrition Evaluation:   · Evaluation: Goals set   · Goals: PO intake to meet >75% of estimated kcal/protein needs with good glycemic control     · Monitoring: Meal Intake, Supplement Intake, Diet Tolerance, I&O, Weight, Pertinent Labs, Chewing/Swallowing, Monitor Bowel Function      Omar Martinez, Dietetic Intern   Electronically signed by Ashleigh Hidalgo RD, LD on 3/29/19 at 11:49 AM    Contact Number: [phone]

## 2019-03-29 NOTE — CARE COORDINATION
Case Management Initial Discharge Plan  Magda Canseco,             Met with:patient to discuss discharge plans. Information verified: address, contacts, phone number, , insurance Yes  PCP: No primary care provider on file. Date of last visit:     Insurance Provider: [facility]    Discharge Planning    Living Arrangements:  Family Members(sister)   Support Systems:  Family Members, [phone] has 1 stories  3 stairs to climb to get into front door, 0 stairs to climb to reach second floor  Location of bedroom/bathroom in home  - main floor    Patient able to perform ADL's:Independent    Current Services (outpatient & in home) none  DME equipment: none  DME provider: N/A    [facility]: [facility]   Potential Assistance Purchasing Medications:  No  Does patient want to participate in local refill/ meds to beds program?  Yes    Potential Assistance Needed:  N/A    Patient agreeable to home care: No  [place] of choice provided:  n/a    Prior SNF/Rehab Placement and Facility: No  Agreeable to SNF/Rehab: No  [place] of choice provided: n/a   Evaluation: n/a    Expected Discharge date:  19  Patient expects to be discharged to:  home  Follow Up Appointment: Best Day/ Time: Wednesday AM(anyday)    Transportation provider:   Transportation arrangements needed for discharge: No    Readmission Risk              Risk of Unplanned Readmission:        10             Does patient have a readmission risk score greater than 14?: No  If yes, follow-up appointment must be made within 7 days of discharge. Discharge Plan: Met with patient at bedside. Lives with sister, independent and drives. Has been having weakness, SOB and cough. Admitted with pneumonia. PCP list given. Continue to follow and no home needs anticipated.           Electronically signed by Darren Perkins RN on 3/29/19 at 4:09 PM

## 2019-03-29 NOTE — PROGRESS NOTES
Physical Therapy    Facility/Department: [facility]  Initial Assessment    NAME: Steffen Hutson  : 1960  MRN: [id number]    Date of Service: 3/29/2019    Discharge Recommendations:  Home with assist PRN, Home with Home health PT        Assessment   Body structures, Functions, Activity limitations: Decreased functional mobility ; Decreased strength;Decreased endurance;Decreased balance    Patient tolerated session well with minimal deficits noted in bed mobility, transfers, ambulation, balance, and endurance this session. Patient's endurance is limiting factor with mobility at this time and patient demos significant deficits as compared to prior level of function. At current level of function, patient will benefit from continued inpatient PT services and will likely be safe to return home with assist as needed and home PT to promote improved safety and IND with all functional mobility after discharge. Prognosis: Good  Decision Making: Low Complexity  Patient Education: PT POC, energy conservation with ambulation  REQUIRES PT FOLLOW UP: Yes  Activity Tolerance  Activity Tolerance: Patient limited by endurance       Patient Diagnosis(es): The encounter diagnosis was Pneumonia due to organism. has a past medical history of COPD (chronic obstructive pulmonary disease) ([facility] [address] Depression, Diabetes mellitus ([facility] [address] and Pneumonia. has a past surgical history that includes lumbar laminectomy ().     Restrictions  Restrictions/Precautions  Restrictions/Precautions: (Droplet precautions)  Required Braces or Orthoses?: No  Position Activity Restriction  Other position/activity restrictions: Per RN, patient is ok to be up with assist as tolerated  Vision/Hearing  Vision: Impaired  Vision Exceptions: Wears glasses for reading  Hearing: Within functional limits     Subjective  General  Patient assessed for rehabilitation services?: Yes  Response To Previous Treatment: Not applicable  Family / Caregiver Present:

## 2019-03-30 VITALS
DIASTOLIC BLOOD PRESSURE: 61 MMHG | RESPIRATION RATE: 18 BRPM | HEIGHT: 70 IN | OXYGEN SATURATION: 94 % | SYSTOLIC BLOOD PRESSURE: 125 MMHG | TEMPERATURE: 98.4 F | BODY MASS INDEX: 27.36 KG/M2 | HEART RATE: 70 BPM | WEIGHT: 191.1 LBS

## 2019-03-30 LAB
ABSOLUTE EOS #: 0.48 K/UL (ref 0–0.4)
ABSOLUTE IMMATURE GRANULOCYTE: ABNORMAL K/UL (ref 0–0.3)
ABSOLUTE LYMPH #: 0.48 K/UL (ref 1–4.8)
ABSOLUTE MONO #: 0.32 K/UL (ref 0.2–0.8)
ANION GAP SERPL CALCULATED.3IONS-SCNC: 9 MMOL/L (ref 9–17)
BASOPHILS # BLD: 2 % (ref 0–2)
BASOPHILS ABSOLUTE: 0.32 K/UL (ref 0–0.2)
BUN BLDV-MCNC: 12 MG/DL (ref 6–20)
BUN/CREAT BLD: 18 (ref 9–20)
CALCIUM SERPL-MCNC: 10.3 MG/DL (ref 8.6–10.4)
CHLORIDE BLD-SCNC: 102 MMOL/L (ref 98–107)
CO2: 25 MMOL/L (ref 20–31)
CREAT SERPL-MCNC: 0.66 MG/DL (ref 0.5–0.9)
DIFFERENTIAL TYPE: ABNORMAL
EOSINOPHILS RELATIVE PERCENT: 3 % (ref 1–4)
GFR AFRICAN AMERICAN: >60 ML/MIN
GFR NON-AFRICAN AMERICAN: >60 ML/MIN
GFR SERPL CREATININE-BSD FRML MDRD: ABNORMAL ML/MIN/{1.73_M2}
GFR SERPL CREATININE-BSD FRML MDRD: ABNORMAL ML/MIN/{1.73_M2}
GLUCOSE BLD-MCNC: 246 MG/DL (ref 65–105)
GLUCOSE BLD-MCNC: 270 MG/DL (ref 70–99)
GLUCOSE BLD-MCNC: 280 MG/DL (ref 65–105)
HCT VFR BLD CALC: 32.5 % (ref 36–46)
HEMOGLOBIN: 10.9 G/DL (ref 12–16)
IMMATURE GRANULOCYTES: ABNORMAL %
LYMPHOCYTES # BLD: 3 % (ref 24–44)
MAGNESIUM: 2 MG/DL (ref 1.6–2.6)
MCH RBC QN AUTO: 32.1 PG (ref 26–34)
MCHC RBC AUTO-ENTMCNC: 33.6 G/DL (ref 31–37)
MCV RBC AUTO: 95.4 FL (ref 80–100)
MONOCYTES # BLD: 2 % (ref 1–7)
NRBC AUTOMATED: ABNORMAL PER 100 WBC
PDW BLD-RTO: 12.5 % (ref 11.5–14.5)
PLATELET # BLD: 353 K/UL (ref 130–400)
PLATELET ESTIMATE: ABNORMAL
PMV BLD AUTO: 8.6 FL (ref 6–12)
POTASSIUM SERPL-SCNC: 4.5 MMOL/L (ref 3.7–5.3)
RBC # BLD: 3.41 M/UL (ref 4–5.2)
RBC # BLD: ABNORMAL 10*6/UL
SEG NEUTROPHILS: 90 % (ref 36–66)
SEGMENTED NEUTROPHILS ABSOLUTE COUNT: 14.3 K/UL (ref 1.8–7.7)
SODIUM BLD-SCNC: 136 MMOL/L (ref 135–144)
TSH SERPL DL<=0.05 MIU/L-ACNC: 0.36 MIU/L (ref 0.3–5)
WBC # BLD: 15.9 K/UL (ref 3.5–11)
WBC # BLD: ABNORMAL 10*3/UL

## 2019-03-30 PROCEDURE — 6370000000 HC RX 637 (ALT 250 FOR IP): Performed by: NURSE PRACTITIONER

## 2019-03-30 PROCEDURE — 83735 ASSAY OF MAGNESIUM: CPT

## 2019-03-30 PROCEDURE — 6360000002 HC RX W HCPCS: Performed by: NURSE PRACTITIONER

## 2019-03-30 PROCEDURE — 97110 THERAPEUTIC EXERCISES: CPT

## 2019-03-30 PROCEDURE — 84443 ASSAY THYROID STIM HORMONE: CPT

## 2019-03-30 PROCEDURE — 97530 THERAPEUTIC ACTIVITIES: CPT

## 2019-03-30 PROCEDURE — 2700000000 HC OXYGEN THERAPY PER DAY

## 2019-03-30 PROCEDURE — 94640 AIRWAY INHALATION TREATMENT: CPT

## 2019-03-30 PROCEDURE — 99239 HOSP IP/OBS DSCHRG MGMT >30: CPT | Performed by: INTERNAL MEDICINE

## 2019-03-30 PROCEDURE — 94618 PULMONARY STRESS TESTING: CPT

## 2019-03-30 PROCEDURE — 2580000003 HC RX 258: Performed by: NURSE PRACTITIONER

## 2019-03-30 PROCEDURE — 36415 COLL VENOUS BLD VENIPUNCTURE: CPT

## 2019-03-30 PROCEDURE — 82947 ASSAY GLUCOSE BLOOD QUANT: CPT

## 2019-03-30 PROCEDURE — 80048 BASIC METABOLIC PNL TOTAL CA: CPT

## 2019-03-30 PROCEDURE — 94760 N-INVAS EAR/PLS OXIMETRY 1: CPT

## 2019-03-30 PROCEDURE — 97165 OT EVAL LOW COMPLEX 30 MIN: CPT

## 2019-03-30 PROCEDURE — 85025 COMPLETE CBC W/AUTO DIFF WBC: CPT

## 2019-03-30 PROCEDURE — 97535 SELF CARE MNGMENT TRAINING: CPT

## 2019-03-30 PROCEDURE — 97116 GAIT TRAINING THERAPY: CPT

## 2019-03-30 RX ORDER — FAMOTIDINE 20 MG/1
20 TABLET, FILM COATED ORAL 2 TIMES DAILY
Qty: 60 TABLET | Refills: 3 | Status: SHIPPED | OUTPATIENT
Start: 2019-03-30 | End: 2020-09-30

## 2019-03-30 RX ORDER — ALBUTEROL SULFATE 2.5 MG/3ML
2.5 SOLUTION RESPIRATORY (INHALATION)
Qty: 120 EACH | Refills: 3 | Status: SHIPPED | OUTPATIENT
Start: 2019-03-30 | End: 2021-06-08 | Stop reason: SDUPTHER

## 2019-03-30 RX ORDER — PREDNISONE 20 MG/1
40 TABLET ORAL DAILY
Qty: 10 TABLET | Refills: 0 | Status: SHIPPED | OUTPATIENT
Start: 2019-03-31 | End: 2019-04-05

## 2019-03-30 RX ORDER — LEVOFLOXACIN 500 MG/1
500 TABLET, FILM COATED ORAL DAILY
Qty: 6 TABLET | Refills: 0 | Status: SHIPPED | OUTPATIENT
Start: 2019-03-30 | End: 2019-04-05

## 2019-03-30 RX ADMIN — IPRATROPIUM BROMIDE AND ALBUTEROL SULFATE 1 AMPULE: .5; 3 SOLUTION RESPIRATORY (INHALATION) at 07:21

## 2019-03-30 RX ADMIN — INSULIN LISPRO 3 UNITS: 100 INJECTION, SOLUTION INTRAVENOUS; SUBCUTANEOUS at 14:12

## 2019-03-30 RX ADMIN — IPRATROPIUM BROMIDE AND ALBUTEROL SULFATE 1 AMPULE: .5; 3 SOLUTION RESPIRATORY (INHALATION) at 10:50

## 2019-03-30 RX ADMIN — INSULIN LISPRO 2 UNITS: 100 INJECTION, SOLUTION INTRAVENOUS; SUBCUTANEOUS at 08:39

## 2019-03-30 RX ADMIN — ASPIRIN 81 MG: 81 TABLET, COATED ORAL at 08:41

## 2019-03-30 RX ADMIN — FLUOXETINE 80 MG: 20 CAPSULE ORAL at 08:44

## 2019-03-30 RX ADMIN — METHYLPREDNISOLONE SODIUM SUCCINATE 40 MG: 40 INJECTION, POWDER, FOR SOLUTION INTRAMUSCULAR; INTRAVENOUS at 05:36

## 2019-03-30 RX ADMIN — METHYLPREDNISOLONE SODIUM SUCCINATE 40 MG: 40 INJECTION, POWDER, FOR SOLUTION INTRAMUSCULAR; INTRAVENOUS at 14:14

## 2019-03-30 RX ADMIN — SODIUM CHLORIDE: 9 INJECTION, SOLUTION INTRAVENOUS at 07:32

## 2019-03-30 RX ADMIN — ENOXAPARIN SODIUM 40 MG: 40 INJECTION SUBCUTANEOUS at 08:38

## 2019-03-30 RX ADMIN — FAMOTIDINE 20 MG: 20 TABLET ORAL at 08:41

## 2019-03-30 ASSESSMENT — PAIN SCALES - GENERAL
PAINLEVEL_OUTOF10: 0

## 2019-03-30 NOTE — PROGRESS NOTES
Physical Therapy  Facility/Department: [facility] MED SURG  Daily Treatment Note  NAME: Shikha Chand  : 1960  MRN: [id number]    Date of Service: 3/30/2019    Discharge Recommendations:  Home with assist PRN, Home with Home health PT        Patient Diagnosis(es): The encounter diagnosis was Pneumonia due to organism. has a past medical history of COPD (chronic obstructive pulmonary disease) ([facility] [address] Depression, Diabetes mellitus ([facility] [address] and Pneumonia. has a past surgical history that includes lumbar laminectomy (). Restrictions  Restrictions/Precautions  Restrictions/Precautions: Fall Risk, Up as Tolerated  Required Braces or Orthoses?: No  Position Activity Restriction  Other position/activity restrictions: Per RN, patient is ok to be up with assist as tolerated  Subjective   Subjective  Subjective: Patient reports she feels much better this date and is eager to discharge home. She denies pain or shortness of breath. General Comment  Comments: LUIS MANUEL Gutierrez) agreeable to therapy session. Pain Screening  Patient Currently in Pain: No  Vital Signs  Patient Currently in Pain: No       Orientation     Cognition      Objective      Transfers  Sit to Stand: Independent  Stand to sit: Independent  Ambulation  Ambulation?: Yes  Ambulation 1  Surface: level tile  Device: No Device  Assistance: Stand by assistance  Quality of Gait: Steady pace with no LOB and no device. No SOB or dyspnea during or after gait. Distance: 180 feet  Comments: Returned to chair at bedside. Stairs/Curb  Stairs?: No        Exercises  Comments: Seated active LE exercises performed shorts sitting at edge of bed prior to walking x 20 reps each.                         Assessment   Body structures, Functions, Activity limitations: Decreased strength;Decreased endurance  No Skilled PT: Independent with functional mobility (Patient advised to ambulate several times per day on her own)  REQUIRES PT FOLLOW UP: No  Activity Tolerance  Activity Tolerance: Patient limited by endurance     G-Code     OutComes Score                                                  AM-PAC Score  AM-PAC Inpatient Mobility Raw Score : 23  AM-PAC Inpatient T-Scale Score : 56.93  Mobility Inpatient CMS 0-100% Score: 11.2  Mobility Inpatient CMS G-Code Modifier : CI          Goals  Short term goals  Time Frame for Short term goals: 10 visits  Short term goal 1: Patient will demo IND transfers to promote safe access to bathroom  Short term goal 2: Patient will amb IND without device on level surfaces without LOB to promote safe negotiation of home environment  Short term goal 3: Patient will demo 'good' standing balance to promote increased safety wtih ADLs and standing activities  Short term goal 4: Patient will tolerate 30 mins of activity to promote increased safety with ambulation  Patient Goals   Patient goals : To get back home and feel stronger    Plan    Plan  Times per week: 1-2 times a day, 5-6 days a week  Current Treatment Recommendations: Endurance Training, Stair training, Home Exercise Program, Strengthening  Safety Devices  Type of devices:  All fall risk precautions in place, Call light within reach, Gait belt, Left in chair, Nurse notified     Therapy Time   Individual Concurrent Group Co-treatment   Time In  305 [facility]         Time Out 1158         Minutes 9482 [facility]

## 2019-03-30 NOTE — PROGRESS NOTES
Learning About the Safe Use of Antibiotics  Introduction  Antibiotics are drugs used to kill bacteria. Bacteria can cause infections. These include strep throat, ear infections, and pneumonia. These medicines can't cure everything. They don't kill viruses or help with allergies. They don't help illnesses such as the common cold, the flu, or a runny nose. And they can cause side effects. There are many types of antibiotics. Your doctor will decide which one will work best for your infection. Examples include:  · Amoxicillin. · Cephalexin (Keflex). · Ciprofloxacin (Cipro). What are the possible side effects? Side effects can include:  · Nausea. · Diarrhea. · Skin rash. · Yeast infection. · A severe allergic reaction. It may cause itching, swelling, and breathing problems. This is rare. You may have other side effects or reactions not listed here. Check the information that comes with your medicine. Should you take antibiotics just in case? Don't take antibiotics when you don't need them. If you do that, they may not work when you do need them. Each time you take antibiotics, you are more likely to have some bacteria that survive and aren't killed by the medicine. Bacteria that don't die can change and become even harder to kill. These are called antibiotic-resistant bacteria. They can cause longer and more serious infections. To treat them, you may need different, stronger antibiotics that have more side effects and may cost more. So always ask your doctor if antibiotics are the best treatment. Explain that you do not want antibiotics unless you need them. Help protect the community  Using antibiotics when they're not needed leads to the development of antibiotic-resistant bacteria. These tougher bacteria can spread to family members, children, and coworkers. People in your community will have a risk of getting an infection that is harder to cure and that costs more to treat.   How can you take antibiotics safely? Be safe with medicine. Take your antibiotics as directed. Do not stop taking them just because you feel better. You need to take the full course of medicine. This will help make sure your infection is cured. It will also help prevent the growth of antibiotic-resistant bacteria. Always take the exact amount that the label says to take. If the label says to take the medicine at a certain time, follow those directions. You might feel better after you take an antibiotic for a few days. But it is important to keep taking it for as long as prescribed. That will help you get rid of those bacteria that are a bit stronger and that survive the first few days of treatment. Where can you learn more? Go to https://[email or web address] org and sign in to your [employer] account. Enter Q195 in the [employer] box to learn more about \"Learning About the Safe Use of Antibiotics. \"     If you do not have an account, please click on the \"Sign Up Now\" link. Current as of: March 3, 2017  Content Version: 11.3  © 9911-0254 [employer]. Care instructions adapted under license by [facility] ([facility]). If you have questions about a medical condition or this instruction, always ask your healthcare professional. [place] [address] any warranty or liability for your use of this information. Antibiotics are powerful drugs that are generally safe and very helpful in fighting disease, but there are times when antibiotics can actually be harmful. Antibiotics can have side effects, including allergic reactions and a potentially deadly diarrhea caused by the bacteria Clostridium difficile (C. diff). Antibiotics can also interfere with the action of other drugs a patient may be taking for another condition. These unintended reactions to antibiotics are called adverse drug events.    When someone takes an antibiotic that they do not need, they are needlessly exposed to the side effects of the drug and do not get any benefit from it. Moreover, taking an antibiotic when it is not needed can lead to the development of antibiotic resistance. When resistance develops, antibiotics may not be able to stop future infections. Every time someone takes an antibiotic they dont need, they increase their risk of developing a resistant infection in the future. Types of Adverse Drug Events Related to Antibiotics  Allergic Reactions  Every year, there are more than 140,000 emergency department visits for reactions to antibiotics. Almost four out of five (79%) emergency department visits for antibiotic-related adverse drug events are due to an allergic reaction. These reactions can range from mild rashes and itching to serious blistering skin reactions swelling of the face and throat, and breathing problems. Minimizing unnecessary antibiotic use is the best way to reduce the risk of adverse drug events from antibiotics. Patients should tell their doctors about any past drug reactions or allergies. C. difficile  C. difficile causes diarrhea linked to at least 14,000 [place] deaths each year. When a person takes antibiotics, good bacteria that protect against infection are destroyed for several months. During this time, patients can get sick from C. difficile picked up from contaminated surfaces or spread from a healthcare providers hands. Those most at risk are people, especially older adults, who take antibiotics and also get medical care. Take antibiotics exactly and only as prescribed. Drug Interactions and Side Effects  Antibiotics can interact with other drugs patients take, making those drugs or the antibiotics less effective. Some drug combinations can worsen the side effects of the antibiotic or other drug. Common side effects of antibiotics include nausea, diarrhea, and stomach pain. Sometimes these symptoms can lead to dehydration and other problems.  Patients should ask their doctors

## 2019-03-30 NOTE — PROGRESS NOTES
Occupational Therapy   Occupational Therapy Initial Assessment  Date: 3/30/2019   Patient Name: Rosalee Evans  MRN: [id number]     : 1960    Date of Service: 3/30/2019    Discharge Recommendations:   Home. Assessment   Performance deficits / Impairments: Decreased endurance  Assessment: 62 y/o female admit with SOB with minimal exertion, PNA. Reports feeling better over admit. Prognosis: Good  Decision Making: Low Complexity  Patient Education: Issued and discussed EC/WS with ADL. Instructed on EC with IADL,techniques. Pt demonstrated learning with acknowledgement. No Skilled OT: Independent with ADL's;At baseline function; Safe to return home  REQUIRES OT FOLLOW UP: No  Activity Tolerance  Activity Tolerance: Patient Tolerated treatment well  Activity Tolerance: Fair +, pt takes rest breaks as needed  Safety Devices  Safety Devices in place: Yes  Type of devices: All fall risk precautions in place           Patient Diagnosis(es): The encounter diagnosis was Pneumonia due to organism. has a past medical history of COPD (chronic obstructive pulmonary disease) ([facility] [address] Depression, Diabetes mellitus ([facility] [address] and Pneumonia. has a past surgical history that includes lumbar laminectomy (). Restrictions  Restrictions/Precautions  Restrictions/Precautions: Fall Risk, Up as Tolerated  Required Braces or Orthoses?: No  Position Activity Restriction  Other position/activity restrictions: Per RN, patient is ok to be up with assist as tolerated    Subjective   General  Chart Reviewed: Yes  Patient assessed for rehabilitation services?: Yes  Family / Caregiver Present: No  Subjective  Subjective: Pt feels breathing is better. Feels better since admit date.   Pain Assessment  Pain Assessment: 0-10  Pain Level: 0  Patient's Stated Pain Goal: No pain  Oxygen Therapy  SpO2: 95 %  Pulse Oximeter Device Mode: Intermittent  Pulse Oximeter Device Location: Finger  O2 Device: Nasal cannula  O2 Flow Rate (L/min): 2 L/min  Social/Functional History  Social/Functional History  Lives With: Other (comment)(Sister)  Type of Home: House  Home Layout: One level  Home Access: Stairs to enter with rails  Entrance Stairs - Number of Steps: 3  Entrance Stairs - Rails: Both  Bathroom Shower/Tub: Walk-in shower  Bathroom Toilet: Handicap height  Receives Help From: Family  ADL Assistance: Independent  Homemaking Assistance: Independent  Homemaking Responsibilities: Yes  Ambulation Assistance: Independent  Transfer Assistance: Independent  Active : Yes  Mode of Transportation: Car  Occupation: On disability  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  Leisure & Hobbies: Spend time with family, sew, pets  Additional Comments: Retired medical assistant       Objective   Vision: Impaired  Vision Exceptions: Wears glasses for reading  Hearing: Within functional limits    Orientation  Overall Orientation Status: Within Normal Limits  Observation/Palpation  Posture: Good  Balance  Sitting Balance: Independent  Standing Balance: Independent  Standing Balance  Sit to stand: Independent  Stand to sit: Independent  Functional Mobility  Functional - Mobility Device: No device  Activity: To/from bathroom; Other  Assist Level: Stand by assistance  Functional Mobility Comments: No LOB, SBA with IV pole. Mild c/o SOB. Pt on room air with mobility. Pt ambulated 2x in room without device. Instructed on EC with mobility.   Toilet Transfers  Toilet - Technique: Ambulating  Toilet Transfer: Independent  ADL  Feeding: Independent  Grooming: Independent  UE Dressing: Stand by assistance(IV in)  LE Dressing: Independent  Toileting: Independent  Tone RUE  RUE Tone: Normotonic  Tone LUE  LUE Tone: Normotonic  Coordination  Movements Are Fluid And Coordinated: Yes     Bed mobility  Rolling to Left: Independent  Supine to Sit: Independent  Scooting: Independent  Transfers  Stand Step Transfers: Independent  Sit to stand: Independent  Stand to sit: Independent     Cognition  Overall Cognitive Status: WNL        Sensation  Overall Sensation Status: WNL        LUE AROM (degrees)  LUE AROM : WNL  Left Hand AROM (degrees)  Left Hand AROM: WNL  RUE AROM (degrees)  RUE AROM : WNL  Right Hand AROM (degrees)  Right Hand AROM: WNL  LUE Strength  Gross LUE Strength: WNL  LUE Strength Comment: 5/5 throughout  RUE Strength  Gross RUE Strength: WNL  RUE Strength Comment: 5/5 throughout                   Plan   Plan  Plan Comment: No OT recommended, OT discontinued.     G-Code     OutComes Score                                                  AM-PAC Score        AM-PAC Inpatient Daily Activity Raw Score: 23  AM-PAC Inpatient ADL T-Scale Score : 51.12  ADL Inpatient CMS 0-100% Score: 15.86  ADL Inpatient CMS G-Code Modifier : CI    Goals          Therapy Time   Individual Concurrent Group Co-treatment   Time In 0735         Time Out 0800         Minutes 41 Trina Jolly

## 2019-03-30 NOTE — PLAN OF CARE
Problem: Falls - Risk of:  Goal: Will remain free from falls  Description  Will remain free from falls  3/30/2019 1435 by Armand Kang RN  Outcome: Ongoing  Note:   Patient is a fall risk during this admission. Fall risk assessment was performed. Patient is absent of falls. Bed is in the lowest position. Wheels on the bed are locked. Call light and bed side table are within reach. Clutter is removed. Patient was educated to call out when needing assistance or wanting to get out of bed. Patient offered toileting assistance during rounding. Hourly rounds have been performed.     3/30/2019 0253 by Rivas Sullivan RN  Outcome: Ongoing  Goal: Absence of physical injury  Description  Absence of physical injury  3/30/2019 0253 by Rivas Sullivan RN  Outcome: Ongoing     Problem: Breathing Pattern - Ineffective:  Goal: Ability to achieve and maintain a regular respiratory rate will improve  Description  Ability to achieve and maintain a regular respiratory rate will improve  3/30/2019 1435 by Armand Kang RN  Outcome: Ongoing  Note:   Resp slightly less labored, assessing need for home O2 today for discharge  3/30/2019 0253 by Rivas Sullivan RN  Outcome: Ongoing     Problem: Airway Clearance - Ineffective:  Goal: Ability to maintain a clear airway will improve  Description  Ability to maintain a clear airway will improve  3/30/2019 1435 by Armand Kang RN  Outcome: Ongoing  Note:   Airway is clear, no acute resp distress  3/30/2019 0253 by Rivas Sullivan RN  Outcome: Ongoing     Problem: Gas Exchange - Impaired:  Goal: Levels of oxygenation will improve  Description  Levels of oxygenation will improve  3/30/2019 1435 by Armand Kang RN  Outcome: Ongoing  Note:   Still using O2 this AM, assessing need for cont O2 @home  3/30/2019 0253 by Rivas Sullivan RN  Outcome: Ongoing     Problem: Discharge Planning:  Goal: Discharged to appropriate level of care  Description  Discharged to appropriate level of care  Outcome: Ongoing  Note:   Pt will be discharged to home, with nebulizer and possible home O2     Problem: Airway Clearance - Ineffective:  Goal: Clear lung sounds  Description  Clear lung sounds  Outcome: Ongoing  Note:   Pt tolerating diet and liquids well     Problem: Tobacco Use:  Goal: Will participate in inpatient tobacco-use cessation counseling  Description  Will participate in inpatient tobacco-use cessation counseling  Outcome: Ongoing  Note:   Smoking cessation reviewed with the pt , refuses need for Nicotine Patch

## 2019-03-30 NOTE — PLAN OF CARE
Problem: Falls - Risk of:  Goal: Will remain free from falls  Description  Will remain free from falls  3/30/2019 0253 by Zoya Limon RN  Outcome: Ongoing  3/29/2019 1517 by Yosi Zamarripa RN  Outcome: Met This Shift  Goal: Absence of physical injury  Description  Absence of physical injury  Outcome: Ongoing     Problem: Breathing Pattern - Ineffective:  Goal: Ability to achieve and maintain a regular respiratory rate will improve  Description  Ability to achieve and maintain a regular respiratory rate will improve  3/30/2019 0253 by Zoya Limon RN  Outcome: Ongoing  3/29/2019 1517 by Yosi Zamarripa RN  Outcome: Ongoing  Note:   Unlabored at rest  Dyspnea with exertion  IS & cough and deep breathe encouraged     Problem: Airway Clearance - Ineffective:  Goal: Ability to maintain a clear airway will improve  Description  Ability to maintain a clear airway will improve  Outcome: Ongoing     Problem: Gas Exchange - Impaired:  Goal: Levels of oxygenation will improve  Description  Levels of oxygenation will improve  Outcome: Ongoing

## 2019-03-30 NOTE — DISCHARGE INSTR - DIET

## 2019-03-30 NOTE — PROGRESS NOTES
[facility]    Progress Note    3/30/2019    8:46 AM    Name:   Magda Canseco  MRN:     [id number]     Acct:      [de-identified]   Room:   2004/2004-02  IP Day:  2  Admit Date:  3/28/2019  6:18 PM    PCP:   No primary care provider on file. Code Status:  Full Code    Subjective:     C/C:   Chief Complaint   Patient presents with    Cough    Shortness of Breath    Fatigue       Interval History Status: Improving    Patient feels less short of breath. Still on O2 per [place]. Home O2 eval needed. Needs to establish with PCP. DAVIDSON neb order placed. Gage Haro for discharge. I reviewed new lab results, imaging, and vitals summary from the past 24 hours. Also, I spoke with the RN caring for patient. Also, I reviewed all nursing/consult/specialty notes and addressed any concerns that may have been brought to light by Consultants, RNs, , or Social Workers. Brief History:     \"63year-old  female presents to the ED with cough, shortness of breath and fatigue. Onset was 11 days prior to admission when she awoke at night gasping for air. Shortness of breath increases with exertion but continues at rest. Her sputum is nonproductive. She has used her rescue inhaler without relief. Family states she set a weight loss of greater than 10 pounds over the last month. Close family members have been diagnosed with influenza. Past medical history includes COPD and diet and told diabetes. She had one point had been on metformin but no longer needs it. Chest x-ray shows left lower lobe pneumonia. Influenza screen is negative. \"    Review of Systems:     Negative except for those highlighted:    CONSTITUTIONAL:  fevers, chills, sweats, fatigue, weight loss, generalized weakness  HEENT:  Vision changes, hearing changes, runny nose, throat pain  RESPIRATORY:  shortness of breath, cough, congestion, wheezing.   CARDIOVASCULAR:  chest pain, palpitations  GASTROINTESTINAL:  nausea, vomiting, diarrhea, constipation, change in bowel habits, abdominal pain   GENITOURINARY:  difficulty of urination, burning with urination, frequency   INTEGUMENT:  rash, skin lesions, easy bruising   HEMATOLOGIC/LYMPHATIC:  swelling/edema   ALLERGIC/IMMUNOLOGIC:  urticaria , itching  ENDOCRINE:  increase in drinking, increase in urination, hot or cold intolerance  MUSCULOSKELETAL:  joint pains, muscle aches, swelling of joints  NEUROLOGICAL:  headaches, dizziness, lightheadedness, numbness, pain, tingling extremities  BEHAVIOR/PSYCH:  depression, anxiety    Medications: Allergies: Allergies   Allergen Reactions    Persantine [Dipyridamole] Hives    Ceftin [Cefuroxime Axetil]      Pt unable to recall type of reaction she had from ceftin    Cephalosporins     Penicillins        Current Meds:   Scheduled Meds:    aspirin  81 mg Oral Daily    FLUoxetine  80 mg Oral Daily    sodium chloride flush  10 mL Intravenous 2 times per day    famotidine  20 mg Oral BID    enoxaparin  40 mg Subcutaneous Daily    ipratropium-albuterol  1 ampule Inhalation Q4H WA    methylPREDNISolone  40 mg Intravenous Q6H    Followed by   Sherry Marie ON 3/31/2019] predniSONE  40 mg Oral Daily    insulin lispro  0-6 Units Subcutaneous TID     insulin lispro  0-3 Units Subcutaneous Nightly    levofloxacin  500 mg Intravenous Q24H     Continuous Infusions:    sodium chloride 75 mL/hr at 03/30/19 0732    dextrose       PRN Meds: sodium chloride flush, magnesium hydroxide, nicotine, albuterol, acetaminophen, glucose, dextrose, glucagon (rDNA), dextrose, ondansetron **OR** ondansetron    Data:     Past Medical History:   has a past medical history of COPD (chronic obstructive pulmonary disease) ([facility] [address] Depression, Diabetes mellitus ([facility] [address] and Pneumonia. Social History:   reports that she has been smoking cigarettes. She has a 17.50 pack-year smoking history.  She has never used smokeless tobacco. She reports that she does not drink alcohol or use drugs. Family History:   Family History   Problem Relation Age of Onset    Heart Disease Mother     Diabetes Mother     Heart Disease Father     Diabetes Father     Diabetes Sister     Hypertension Sister        Vitals:  /62   Pulse 62   Temp 98.2 °F (36.8 °C) (Oral)   Resp 18   Ht 5' 10\" (1.778 m)   Wt 191 lb 1.6 oz (86.7 kg)   SpO2 95%   BMI 27.42 kg/m²   Temp (24hrs), Av.2 °F (36.8 °C), Min:98.2 °F (36.8 °C), Max:98.2 °F (36.8 °C)    Recent Labs     19  1109 19  1608 19  2113 19  0621   POCGLU 264* 258* 175* 246*       I/O (24Hr):     Intake/Output Summary (Last 24 hours) at 3/30/2019 0846  Last data filed at 3/30/2019 0025  Gross per 24 hour   Intake 1750 ml   Output 600 ml   Net 1150 ml       Labs:    Hematology:  Recent Labs     19  0504 19  0501   WBC 6.5 5.6 15.9*   RBC 3.55* 3.75* 3.41*   HGB 11.4* 12.0 10.9*   HCT 33.8* 35.9* 32.5*   MCV 95.2 95.8 95.4   MCH 32.1 32.0 32.1   MCHC 33.7 33.4 33.6   RDW 12.7 12.7 12.5    313 353   MPV 8.3 8.9 8.6     Chemistry:  Recent Labs     19  0504 19  0501    136 136   K 3.5* 4.5 4.5    99 102   CO2 26 24 25   GLUCOSE 133* 204* 270*   BUN 5* 8 12   CREATININE 0.70 0.58 0.66   MG 1.8  --  2.0   ANIONGAP 14 13 9   LABGLOM >60 >60 >60   GFRAA >60 >60 >60   CALCIUM 10.6* 10.6* 10.3     Recent Labs     19  0504 19  0716 19  1109 19  1608 19  2113 19  0501 19  0621   LABA1C  --  6.1*  --   --   --   --   --   --    TSH  --   --   --   --   --   --  0.36  --    POCGLU 182*  --  233* 264* 258* 175*  --  246*         Lab Results   Component Value Date/Time    SPECIAL 20CC LEFT WRIST 2019 07:55 PM     Lab Results   Component Value Date/Time    CULTURE NO GROWTH 2 DAYS 2019 07:55 PM       No results found for: POCPH, PHART, PH, POCPCO2, XGX7XML, PCO2, POCPO2, PO2ART, PO2, POCHCO3, GMK7UAT, HCO3, NBEA, PBEA, BEART, BE, THGBART, THB, MWH1MKM, XNDA8DBT, Q1GPFYUV, O2SAT, FIO2    Radiology:  Xr Chest Standard (2 Vw)    Result Date: 3/28/2019  EXAMINATION: TWO VIEWS OF THE CHEST 3/28/2019 6:55 pm COMPARISON: October 2018 HISTORY: ORDERING SYSTEM PROVIDED HISTORY: cough TECHNOLOGIST PROVIDED HISTORY: cough Ordering Physician Provided Reason for Exam: cough Acuity: Acute Type of Exam: Initial FINDINGS: Heart size is stable. Ill-defined increased density in the left lung base is noted. There is no focal consolidation. There is no pneumothorax. There is no effusion. Ill-defined increased density left lung base suggesting developing pneumonia     Xr Chest Portable    Result Date: 3/29/2019  EXAMINATION: SINGLE XRAY VIEW OF THE CHEST 3/29/2019 6:34 am COMPARISON: March 28, 2018, October 15, 2018 HISTORY: ORDERING SYSTEM PROVIDED HISTORY: AECOPD TECHNOLOGIST PROVIDED HISTORY: AECOPD FINDINGS: Shallow inflation. Relative elevation of the left hemidiaphragm is again noted. The cardiac mediastinal contours are unchanged in appearance. Basilar linear opacities are again noted without significant interval change. No new airspace disease, pneumothorax or effusion. No acute osseous abnormality identified. No appreciable change in basilar interstitial opacities. While this in part may represent atelectasis, developing infiltrate should be considered in the appropriate clinical setting. Ct Chest Pulmonary Embolism W Contrast    Result Date: 3/29/2019  EXAMINATION: CTA OF THE CHEST 3/29/2019 4:56 pm TECHNIQUE: CTA of the chest was performed after the administration of intravenous contrast.  Multiplanar reformatted images are provided for review. MIP images are provided for review.  Dose modulation, iterative reconstruction, and/or weight based adjustment of the mA/kV was utilized to reduce the radiation dose to as low as reasonably achievable. COMPARISON: None. HISTORY: ORDERING SYSTEM PROVIDED HISTORY: Acute shortness of breath FINDINGS: Pulmonary Arteries: Pulmonary arteries are adequately opacified for evaluation. No evidence of intraluminal filling defect to suggest pulmonary embolism. Main pulmonary artery is normal in caliber. Mediastinum: No evidence of mediastinal lymphadenopathy. The heart and pericardium demonstrate no acute abnormality. There is no acute abnormality of the thoracic aorta. Lungs/pleura: Mild atelectasis at the right lung base. No focal consolidation or pulmonary edema. No evidence of pleural effusion or pneumothorax. Upper Abdomen: Limited images of the upper abdomen are unremarkable. Multiple small calcified granulomas in the spleen. 2 cm right adrenal mass. Soft Tissues/Bones: No acute bone or soft tissue abnormality. No evidence of pulmonary embolism. Mild atelectasis at the right lung base. 2 cm right adrenal mass. Recommend further characterization with abdomen CT/adrenal mass protocol or adrenal MRI. Physical Examination:        /62   Pulse 62   Temp 98.2 °F (36.8 °C) (Oral)   Resp 18   Ht 5' 10\" (1.778 m)   Wt 191 lb 1.6 oz (86.7 kg)   SpO2 95%   BMI 27.42 kg/m²   Temp (24hrs), Av.2 °F (36.8 °C), Min:98.2 °F (36.8 °C), Max:98.2 °F (36.8 °C)    Recent Labs     19  1109 19  1608 19  2113 19  0621   POCGLU 264* 258* 175* 246*       Intake/Output Summary (Last 24 hours) at 3/30/2019 0846  Last data filed at 3/30/2019 0607  Gross per 24 hour   Intake 1750 ml   Output 600 ml   Net 1150 ml       General Appearance:  alert, well appearing, and in no acute distress  Mental status: oriented to person, place, and time with normal affect  Head:  normocephalic, atraumatic.   Eye: no icterus, redness, pupils equal and reactive, extraocular eye movements intact, conjunctiva clear  Ear: normal external ear, no discharge, hearing intact  Nose:  no drainage noted  Mouth: mucous membranes moist  Neck: supple, no carotid bruits, thyroid not palpable  Lungs: Decreased air entry bilaterally, expiratory wheezing  Cardiovascular: normal rate, regular rhythm, no murmur, gallop, rub. Abdomen: Soft, nontender, nondistended, normal bowel sounds, no hepatomegaly or splenomegaly  Neurologic: There are no new focal motor or sensory deficits, normal muscle tone and bulk, no abnormal sensation, normal speech, cranial nerves II through XII grossly intact  Skin: No gross lesions, rashes, bruising or bleeding on exposed skin area  Extremities:  peripheral pulses palpable, no pedal edema or calf pain with palpation  Psych: normal affect      Assessment:        Principal Problem:    Pneumonia of left lower lobe due to infectious organism [facility])  Active Problems:    COPD (chronic obstructive pulmonary disease) ([facility])    Tobacco abuse    Hypercalcemia  Resolved Problems:    Pneumonia      Plan:        Principal Problem:    Pneumonia of left lower lobe due to infectious organism [facility])  Active Problems:    COPD (chronic obstructive pulmonary disease) ([facility])    Tobacco abuse    Hypercalcemia  Resolved Problems:    Pneumonia    1. Cont levaquin. Change to oral  2. Cont nebs, inhalers  3. SSI. Carb control diet. Hypoglycemia protocol in place. 4. Cont home meds  5. Cont oral prednisone  6. GI PPx  7. DVT PPx  8. CTA showed 2 cm right adrenal mass. Recommend further characterization with abdomen CT/adrenal mass protocol or adrenal MRI as outpatient. To be followed up by PCP. 5. Glenn Chapa was evaluated today and a DME order was entered for a nebulizer compressor in order to administer Albuterol due to the diagnosis of Acute COPD Exacerbation. The need for this equipment and treatment was discussed with the patient and she understands and is in agreement.       IV Fluids: sodium chloride Last Rate: 75 mL/hr at 03/30/19 0732    dextrose,    Nutrition:  DIET DENTAL SOFT; Carb Control: 4 carb choices (60 gms)/meal      Consultations:   Kristi Gonzalez MD  3/30/2019  8:46 AM

## 2019-03-30 NOTE — DISCHARGE SUMMARY
[facility]    Discharge Summary     Patient ID: Mamadou Cook  :  1960   MRN: [id number]     ACCOUNT:  [de-identified]   Patient's PCP: No primary care provider on file. Admit Date: 3/28/2019   Discharge Date: 3/30/2019     Length of Stay: 2  Code Status:  Full Code  Admitting Physician: Luis Ferris DO  Discharge Physician: Rito Vázquez MD     Active Discharge Diagnoses:     Hospital Problem Lists:  Principal Problem:    Pneumonia of left lower lobe due to infectious organism [facility])  Active Problems:    COPD (chronic obstructive pulmonary disease) ([facility] [address]    Tobacco abuse    Hypercalcemia  Resolved Problems:    Pneumonia      Admission Condition:  fair     Discharged Condition: good    Hospital Stay:     Hospital Course:  Mamadou Cook is a 63 y.o. female who was admitted for the management of   Pneumonia of left lower lobe due to infectious organism [facility]) , presented to ER with Cough; Shortness of Breath; and Fatigue    \"63year-old  female presents to the ED with cough, shortness of breath and fatigue. Onset was 11 days prior to admission when she awoke at night gasping for air. Shortness of breath increases with exertion but continues at rest. Her sputum is nonproductive. She has used her rescue inhaler without relief. Family states she set a weight loss of greater than 10 pounds over the last month. Close family members have been diagnosed with influenza. Past medical history includes COPD and diet and told diabetes. She had one point had been on metformin but no longer needs it. Chest x-ray shows left lower lobe pneumonia. Influenza screen is negative. \"    Significant therapeutic interventions:     1. Cont levaquin. Change to oral  2. Cont nebs, inhalers  3. SSI. Carb control diet. Hypoglycemia protocol in place. 4. Cont home meds  5. Cont oral prednisone  6. GI PPx  7. DVT PPx  8.  CTA showed 2 cm right adrenal mass.  Recommend further characterization with abdomen CT/adrenal mass protocol or adrenal MRI as outpatient. To be followed up by PCP. 5Sakshi Chand was evaluated today and a DME order was entered for a nebulizer compressor in order to administer Albuterol due to the diagnosis of Acute COPD Exacerbation. The need for this equipment and treatment was discussed with the patient and she understands and is in agreement. Significant Diagnostic Studies:   Labs / Micro:  CBC:   Lab Results   Component Value Date    WBC 15.9 03/30/2019    RBC 3.41 03/30/2019    HGB 10.9 03/30/2019    HCT 32.5 03/30/2019    MCV 95.4 03/30/2019    MCH 32.1 03/30/2019    MCHC 33.6 03/30/2019    RDW 12.5 03/30/2019     03/30/2019     BMP:    Lab Results   Component Value Date    GLUCOSE 270 03/30/2019     03/30/2019    K 4.5 03/30/2019     03/30/2019    CO2 25 03/30/2019    ANIONGAP 9 03/30/2019    BUN 12 03/30/2019    CREATININE 0.66 03/30/2019    BUNCRER 18 03/30/2019    CALCIUM 10.3 03/30/2019    LABGLOM >60 03/30/2019    GFRAA >60 03/30/2019    GFR      03/30/2019    GFR NOT REPORTED 03/30/2019       Radiology:    Xr Chest Standard (2 Vw)    Result Date: 3/28/2019  EXAMINATION: TWO VIEWS OF THE CHEST 3/28/2019 6:55 pm COMPARISON: October 2018 HISTORY: ORDERING SYSTEM PROVIDED HISTORY: cough TECHNOLOGIST PROVIDED HISTORY: cough Ordering Physician Provided Reason for Exam: cough Acuity: Acute Type of Exam: Initial FINDINGS: Heart size is stable. Ill-defined increased density in the left lung base is noted. There is no focal consolidation. There is no pneumothorax. There is no effusion.      Ill-defined increased density left lung base suggesting developing pneumonia     Xr Chest Portable    Result Date: 3/29/2019  EXAMINATION: SINGLE XRAY VIEW OF THE CHEST 3/29/2019 6:34 am COMPARISON: March 28, 2018, October 15, 2018 HISTORY: ORDERING SYSTEM PROVIDED HISTORY: AECOPD TECHNOLOGIST PROVIDED HISTORY: AECOPD FINDINGS: Shallow inflation. Relative elevation of the left hemidiaphragm is again noted. The cardiac mediastinal contours are unchanged in appearance. Basilar linear opacities are again noted without significant interval change. No new airspace disease, pneumothorax or effusion. No acute osseous abnormality identified. No appreciable change in basilar interstitial opacities. While this in part may represent atelectasis, developing infiltrate should be considered in the appropriate clinical setting. Ct Chest Pulmonary Embolism W Contrast    Result Date: 3/29/2019  EXAMINATION: CTA OF THE CHEST 3/29/2019 4:56 pm TECHNIQUE: CTA of the chest was performed after the administration of intravenous contrast.  Multiplanar reformatted images are provided for review. MIP images are provided for review. Dose modulation, iterative reconstruction, and/or weight based adjustment of the mA/kV was utilized to reduce the radiation dose to as low as reasonably achievable. COMPARISON: None. HISTORY: ORDERING SYSTEM PROVIDED HISTORY: Acute shortness of breath FINDINGS: Pulmonary Arteries: Pulmonary arteries are adequately opacified for evaluation. No evidence of intraluminal filling defect to suggest pulmonary embolism. Main pulmonary artery is normal in caliber. Mediastinum: No evidence of mediastinal lymphadenopathy. The heart and pericardium demonstrate no acute abnormality. There is no acute abnormality of the thoracic aorta. Lungs/pleura: Mild atelectasis at the right lung base. No focal consolidation or pulmonary edema. No evidence of pleural effusion or pneumothorax. Upper Abdomen: Limited images of the upper abdomen are unremarkable. Multiple small calcified granulomas in the spleen. 2 cm right adrenal mass. Soft Tissues/Bones: No acute bone or soft tissue abnormality. No evidence of pulmonary embolism. Mild atelectasis at the right lung base. 2 cm right adrenal mass.   Recommend further characterization with abdomen CT/adrenal mass protocol or adrenal MRI. Consultations:    Consults:     Final Specialist Recommendations/Findings:   IP CONSULT TO DIETITIAN      The patient was seen and examined on day of discharge and this discharge summary is in conjunction with any daily progress note from day of discharge. Discharge plan:     Disposition: Home    Physician Follow Up:     PCP      Please make appt with PCP within a week, pick from list given to you       Requiring Further Evaluation/Follow Up POST HOSPITALIZATION/Incidental Findings: F/U with PCP    Diet: regular diet    Activity: As tolerated    Instructions to Patient: F/U with PCP    Discharge Medications:      Medication List      START taking these medications    famotidine 20 MG tablet  Commonly known as:  PEPCID  Take 1 tablet by mouth 2 times daily     levofloxacin 500 MG tablet  Commonly known as:  LEVAQUIN  Take 1 tablet by mouth daily for 6 days     predniSONE 20 MG tablet  Commonly known as:  DELTASONE  Take 2 tablets by mouth daily for 5 days  Start taking on:  3/31/2019        CHANGE how you take these medications    * albuterol sulfate  (90 Base) MCG/ACT inhaler  Commonly known as:  VENTOLIN HFA  Inhale 2 puffs into the lungs every 6 hours as needed for Wheezing  What changed:  Another medication with the same name was added. Make sure you understand how and when to take each. * albuterol (2.5 MG/3ML) 0.083% nebulizer solution  Commonly known as:  PROVENTIL  Take 3 mLs by nebulization every 2 hours as needed for Wheezing  What changed: You were already taking a medication with the same name, and this prescription was added. Make sure you understand how and when to take each. * This list has 2 medication(s) that are the same as other medications prescribed for you. Read the directions carefully, and ask your doctor or other care provider to review them with you.             CONTINUE taking these medications    aspirin 81 MG tablet FLUoxetine 20 MG capsule  Commonly known as:  PROZAC     METFORMIN HCL PO           Where to Get Your Medications      These medications were sent to [facility] [facility] [phone] - f [phone]  [address] [place] [address],  [place]  [address]    Phone:  [phone]   · albuterol (2.5 MG/3ML) 0.083% nebulizer solution  · famotidine 20 MG tablet  · levofloxacin 500 MG tablet  · predniSONE 20 MG tablet         Time Spent on discharge is  37 mins in patient examination, evaluation, counseling as well as medication reconciliation, prescriptions for required medications, discharge plan and follow up. Electronically signed by   Elodia Moseley MD  3/30/2019  4:23 PM      Thank you Dr. Dara Alford primary care provider on file. for the opportunity to be involved in this patient's care.

## 2019-03-30 NOTE — CARE COORDINATION
Script obtained for nebulizer. Met with pt at bedside. Discussed provider for nebulizer. Pt has no preferences. Agreeable to [facility]. Rachna Pacer here with [facility] and is able to deliver neb to bedside now. Demos, script, progress note and MAR faxed to [facility]. No other dc needs.

## 2019-04-01 NOTE — PROGRESS NOTES
CLINICAL PHARMACY NOTE: MEDS TO [address] Select Patient?: No  Total # of Prescriptions Filled: 4   The following medications were delivered to the patient:  · FAMOTIDINE  · PREDNISONE  · LEVOFLOXACIN 500  · ALBUTEROL SULFATE  Total # of Interventions Completed: 0  Time Spent (min): 45    Additional Documentation:    MEDICATIONS PICKED UP BY PT IN PERSON OUTSIDE OF M2B HOURS

## 2019-04-03 ENCOUNTER — OFFICE VISIT (OUTPATIENT)
Dept: FAMILY MEDICINE CLINIC | Age: 59
End: 2019-04-03
Payer: MEDICARE

## 2019-04-03 VITALS
SYSTOLIC BLOOD PRESSURE: 116 MMHG | DIASTOLIC BLOOD PRESSURE: 64 MMHG | BODY MASS INDEX: 25.97 KG/M2 | TEMPERATURE: 98.2 F | WEIGHT: 181 LBS | HEART RATE: 68 BPM

## 2019-04-03 DIAGNOSIS — J44.0 CHRONIC OBSTRUCTIVE PULMONARY DISEASE WITH ACUTE LOWER RESPIRATORY INFECTION (HCC): ICD-10-CM

## 2019-04-03 DIAGNOSIS — R73.03 PRE-DIABETES: ICD-10-CM

## 2019-04-03 DIAGNOSIS — Z76.89 ENCOUNTER TO ESTABLISH CARE: Primary | ICD-10-CM

## 2019-04-03 DIAGNOSIS — J18.9 PNEUMONIA OF LEFT LOWER LOBE DUE TO INFECTIOUS ORGANISM: ICD-10-CM

## 2019-04-03 DIAGNOSIS — F34.1 DYSTHYMIA: ICD-10-CM

## 2019-04-03 LAB
CULTURE: NORMAL
CULTURE: NORMAL
Lab: NORMAL
Lab: NORMAL
SPECIMEN DESCRIPTION: NORMAL
SPECIMEN DESCRIPTION: NORMAL

## 2019-04-03 PROCEDURE — 99203 OFFICE O/P NEW LOW 30 MIN: CPT | Performed by: NURSE PRACTITIONER

## 2019-04-03 ASSESSMENT — ENCOUNTER SYMPTOMS
WHEEZING: 0
DIARRHEA: 0
GASTROINTESTINAL NEGATIVE: 1
NAUSEA: 0
VOMITING: 0
ABDOMINAL PAIN: 0

## 2019-04-03 NOTE — PROGRESS NOTES
diarrhea, nausea and vomiting. Endocrine:        H/o Pre-diabetes. See HPI    Genitourinary: Negative. Negative for difficulty urinating, flank pain and hematuria. Skin: Negative. Negative for rash. Neurological: Negative. Negative for dizziness and headaches. Psychiatric/Behavioral: Positive for dysphoric mood (See HPI ). PAST MEDICAL HISTORY    Past Medical History:   Diagnosis Date    COPD (chronic obstructive pulmonary disease) ([facility] [address]     Depression     Pneumonia 2017 & 2019    twice     Pre-diabetes        FAMILY HISTORY    Family History   Problem Relation Age of Onset    Heart Disease Mother     Diabetes Mother     Heart Attack Mother 36        COD, she had 3 heart attacks     Heart Disease Father     Diabetes Father     Heart Attack Father 50        COD     Diabetes Sister     Hypertension Sister     Atrial Fibrillation Sister     Diabetes type 2  Maternal Grandmother         COD    Heart Disease Maternal Grandfather         COD    Diabetes type 2  Paternal Grandmother     Heart Disease Paternal Grandmother     Diabetes type 2  Paternal Grandfather     Heart Disease Paternal Grandfather     Stroke Brother         COD     Alcohol Abuse Brother     Hypertension Brother     ADHD Brother     Heart Attack Brother 36        several     Heart Attack Paternal Uncle     Pancreatic Cancer Paternal Uncle     Pancreatic Cancer Paternal Uncle        SOCIAL HISTORY    Social History     Socioeconomic History    Marital status:       Spouse name: None    Number of children: None    Years of education: None    Highest education level: None   Occupational History    None   Social Needs    Financial resource strain: None    Food insecurity:     Worry: None     Inability: None    Transportation needs:     Medical: None     Non-medical: None   Tobacco Use    Smoking status: Former Smoker     Packs/day: 0.50     Years: 35.00     Pack years: 17.50     Types: Cigarettes Last attempt to quit: 3/15/2019     Years since quittin.0    Smokeless tobacco: Never Used   Substance and Sexual Activity    Alcohol use: No    Drug use: No    Sexual activity: None   Lifestyle    Physical activity:     Days per week: None     Minutes per session: None    Stress: None   Relationships    Social connections:     Talks on phone: None     Gets together: None     Attends Christianity service: None     Active member of club or organization: None     Attends meetings of clubs or organizations: None     Relationship status: None    Intimate partner violence:     Fear of current or ex partner: None     Emotionally abused: None     Physically abused: None     Forced sexual activity: None   Other Topics Concern    None   Social History Narrative    None       SURGICAL HISTORY    Past Surgical History:   Procedure Laterality Date    CATARACT REMOVAL Bilateral     LUMBAR LAMINECTOMY  1991    TOOTH EXTRACTION         CURRENT MEDICATIONS    Current Outpatient Medications   Medication Sig Dispense Refill    albuterol (PROVENTIL) (2.5 MG/3ML) 0.083% nebulizer solution Take 3 mLs by nebulization every 2 hours as needed for Wheezing 120 each 3    famotidine (PEPCID) 20 MG tablet Take 1 tablet by mouth 2 times daily 60 tablet 3    albuterol sulfate HFA (VENTOLIN HFA) 108 (90 Base) MCG/ACT inhaler Inhale 2 puffs into the lungs every 6 hours as needed for Wheezing 1 Inhaler 3    aspirin 81 MG tablet Take 81 mg by mouth daily      FLUoxetine (PROZAC) 20 MG capsule Take 80 mg by mouth daily       No current facility-administered medications for this visit. ALLERGIES    Allergies   Allergen Reactions    Persantine [Dipyridamole] Hives    Ceftin [Cefuroxime Axetil]      Pt unable to recall type of reaction she had from ceftin    Cephalosporins     Penicillins      PHYSICAL EXAM   Physical Exam   Constitutional: She is oriented to person, place, and time.  Vital signs are normal. She appears well-developed and well-nourished. She is cooperative. No distress. HENT:   Head: Normocephalic. Right Ear: Hearing normal.   Left Ear: Hearing normal.   Eyes: Right eye exhibits no discharge. Left eye exhibits no discharge. Pupils are equal.   Neck: Normal range of motion. Cardiovascular: Normal rate, regular rhythm, S1 normal, S2 normal, normal heart sounds and normal pulses. No murmur heard. Pulses:       Radial pulses are 2+ on the right side, and 2+ on the left side. Pulmonary/Chest: Effort normal and breath sounds normal. No respiratory distress. She has no wheezes. Neurological: She is alert and oriented to person, place, and time. Skin: Skin is warm and dry. She is not diaphoretic. Psychiatric: She has a normal mood and affect. Her behavior is normal.   Nursing note and vitals reviewed. Assessment/PLan  1. Encounter to establish care      2. Pneumonia of left lower lobe due to infectious organism ([address]  3. Chronic obstructive pulmonary disease with acute lower respiratory infection (HCC)    -Complete abx and course of steroids as prescribed by hospital. Continue nebulized treatments ASAP. -Encouraged patient to maintain non-smoking status.   -Will call radiologist to discuss findings on CTA to discuss their recommendation on how quickly to repeat CT.   -Once healed from pneumonia, will evaluate COPD and add maintenance medication if needed. 4. Dysthymia    -Chronic, stable, continue current medications and continue following with specialist as advised. 5. Pre-diabetes    -Chronic, stable, continue following diabetic diet. Will obtain additional fasting labs when available. Doreen received counseling on the following healthy behaviors: nutrition, exercise, medication adherence and tobacco cessation  Reviewed prior labs and health maintenance  Continue current medications, diet and exercise. Discussed use, benefit, and side effects of prescribed medications.  Barriers to medication compliance addressed. Patient given educational materials - see patient instructions  Was a self-tracking handout given in paper form or via [employer]? No:     Requested Prescriptions      No prescriptions requested or ordered in this encounter       All patient questions answered. Patient voiced understanding. Quality Measures    Body mass index is 25.97 kg/m². Normal. Weight control planned discussed conventional weight loss and Healthy diet and regular exercise. BP: 116/64 Blood pressure is normal. Treatment plan consists of No treatment change needed. Lab Results   Component Value Date    LDLCHOLESTEROL 155 (H) 08/04/2016    (goal LDL reduction with dx if diabetes is 50% LDL reduction)      No flowsheet data found.   Interpretation of Total Score Depression Severity: 1-4 = Minimal depression, 5-9 = Mild depression, 10-14 = Moderate depression, 15-19 = Moderately severe depression, 20-27 = Severe depression     Return in about 2 weeks (around 4/17/2019) for Physical.    Electronically signed by ILA Jay CNP on 4/3/19 at 2:31 PM

## 2019-04-05 ENCOUNTER — TELEPHONE (OUTPATIENT)
Dept: FAMILY MEDICINE CLINIC | Age: 59
End: 2019-04-05

## 2019-04-05 DIAGNOSIS — E27.8 RIGHT ADRENAL MASS (HCC): Primary | ICD-10-CM

## 2019-04-05 NOTE — TELEPHONE ENCOUNTER
Spoke to radiologist regarding CTA of chest in which an incidental adrenal mass was found. Discussed when to re-do the CT adrenal mass protocol and he indicated that it would be better to do sooner than later. He does not feel there is a reason to wait 2 months. Attempted to vern [facility] CT dept to help me order a CT adrenal mass protocol as I am not finding it in the system. No one answered. Could only leave msg. Will call back later.

## 2019-04-05 NOTE — TELEPHONE ENCOUNTER
I have talked with the radiologist regarding her CT chest on 3/29. I would like her to get a repeat CT of her abdomen for the adrenal mass that was found. I would recommend that she have it done sometime in the next month. Since her CT chest was done at [facility] I would suggest that she have the CT of her abdomen done there as well. The order is already in. She can call [phone] to schedule her CT apt.

## 2019-04-08 ENCOUNTER — TELEPHONE (OUTPATIENT)
Dept: FAMILY MEDICINE CLINIC | Age: 59
End: 2019-04-08

## 2019-04-14 ASSESSMENT — ENCOUNTER SYMPTOMS
CONSTIPATION: 0
COUGH: 1
SHORTNESS OF BREATH: 1

## 2019-04-16 ENCOUNTER — HOSPITAL ENCOUNTER (OUTPATIENT)
Dept: CT IMAGING | Age: 59
Discharge: HOME | End: 2019-04-18
Payer: MEDICARE

## 2019-04-16 DIAGNOSIS — E27.8 RIGHT ADRENAL MASS (HCC): ICD-10-CM

## 2019-04-16 PROCEDURE — 74150 CT ABDOMEN W/O CONTRAST: CPT

## 2019-04-17 ENCOUNTER — TELEPHONE (OUTPATIENT)
Dept: FAMILY MEDICINE CLINIC | Age: 59
End: 2019-04-17

## 2019-04-17 ENCOUNTER — OFFICE VISIT (OUTPATIENT)
Dept: FAMILY MEDICINE CLINIC | Age: 59
End: 2019-04-17
Payer: MEDICARE

## 2019-04-17 VITALS
HEART RATE: 68 BPM | SYSTOLIC BLOOD PRESSURE: 112 MMHG | BODY MASS INDEX: 25.7 KG/M2 | DIASTOLIC BLOOD PRESSURE: 68 MMHG | WEIGHT: 179.5 LBS | HEIGHT: 70 IN

## 2019-04-17 DIAGNOSIS — R73.03 PRE-DIABETES: ICD-10-CM

## 2019-04-17 DIAGNOSIS — R53.83 OTHER FATIGUE: ICD-10-CM

## 2019-04-17 DIAGNOSIS — Z12.39 SCREENING FOR BREAST CANCER: ICD-10-CM

## 2019-04-17 DIAGNOSIS — Z11.59 ENCOUNTER FOR HEPATITIS C VIRUS SCREENING TEST FOR HIGH RISK PATIENT: ICD-10-CM

## 2019-04-17 DIAGNOSIS — Z82.49 FAMILY HISTORY OF HEART DISEASE: ICD-10-CM

## 2019-04-17 DIAGNOSIS — Z12.4 CERVICAL CANCER SCREENING: ICD-10-CM

## 2019-04-17 DIAGNOSIS — Z91.89 ENCOUNTER FOR HEPATITIS C VIRUS SCREENING TEST FOR HIGH RISK PATIENT: ICD-10-CM

## 2019-04-17 DIAGNOSIS — E27.8 RIGHT ADRENAL MASS (HCC): ICD-10-CM

## 2019-04-17 DIAGNOSIS — Z12.11 COLON CANCER SCREENING: ICD-10-CM

## 2019-04-17 DIAGNOSIS — Z13.220 SCREENING FOR HYPERLIPIDEMIA: ICD-10-CM

## 2019-04-17 DIAGNOSIS — J44.0 CHRONIC OBSTRUCTIVE PULMONARY DISEASE WITH ACUTE LOWER RESPIRATORY INFECTION (HCC): Primary | ICD-10-CM

## 2019-04-17 PROCEDURE — 99396 PREV VISIT EST AGE 40-64: CPT | Performed by: NURSE PRACTITIONER

## 2019-04-17 RX ORDER — NEBULIZER ACCESSORIES
1 KIT MISCELLANEOUS DAILY PRN
Qty: 1 KIT | Refills: 0 | Status: SHIPPED | OUTPATIENT
Start: 2019-04-17 | End: 2020-02-03 | Stop reason: CLARIF

## 2019-04-17 ASSESSMENT — ENCOUNTER SYMPTOMS
BACK PAIN: 0
GASTROINTESTINAL NEGATIVE: 1
ABDOMINAL PAIN: 0
NAUSEA: 0
ALLERGIC/IMMUNOLOGIC NEGATIVE: 1
EYES NEGATIVE: 1
DIARRHEA: 0
SHORTNESS OF BREATH: 1
COUGH: 0
VOMITING: 0
WHEEZING: 0
CONSTIPATION: 0

## 2019-04-17 NOTE — PROGRESS NOTES
[facility] PHYSICIANS  LIZZETTE LIZARRAGA [facility] [address] [facility]  [address] [facility] [address] [address]  Dept: [phone]    4/17/2019    CHIEF COMPLAINT    Chief Complaint   Patient presents with    Annual Exam       HPI    Michelle Garcia is a 63 y.o. female who presents   Chief Complaint   Patient presents with    Annual Exam     Here for full physical as advised at NP appointment. Recovering from Pneumonia-She reports that she is still propping up with pillows at night since the pneumonia. She indicates this isn't due to SOB or cough, but because it's more comfortable. Depression- Continues seeing Dr. Purnima Resendez consistently for the last 2-3 years. Patient feels well controlled at this time on Prozac. Adrenal Mass- Noted on CTA in hospital when being treated for pneumonia. Repeat scan yesterday shows a stable right adrenal gland adenoma. No follow-up imaging needed. Also shows improving pneumonia. H/o Pre-iabetes- -Does not test BG. Lives with sister who is diabetic and will test BG PRN. Denies any s/s of hypo/hyperglycemia. Last A1C on 3/29/19 was 6.1     Vitals:    04/17/19 1400   BP: 112/68   Pulse: 68   Weight: 179 lb 8 oz (81.4 kg)   Height: 5' 10\" (1.778 m)     REVIEW OF SYSTEMS    Review of Systems   Constitutional: Positive for fatigue. Negative for chills and fever. HENT: Negative. Eyes: Negative. Negative for visual disturbance. Respiratory: Positive for shortness of breath (with exertion ). Negative for cough and wheezing. Cardiovascular: Negative. Negative for chest pain and palpitations. Gastrointestinal: Negative. Negative for abdominal pain, constipation, diarrhea, nausea and vomiting. Endocrine: Negative. Genitourinary: Negative. Negative for difficulty urinating. Musculoskeletal: Negative. Negative for back pain. Skin: Negative. Negative for rash. Allergic/Immunologic: Negative. Neurological: Negative. Negative for dizziness and headaches. Hematological: Negative. Negative for adenopathy. Psychiatric/Behavioral: Positive for dysphoric mood. The patient is nervous/anxious. See HPI      PAST MEDICAL HISTORY    Past Medical History:   Diagnosis Date    Adenoma of right adrenal gland     Stable 4/2019. No repeat imaging advised.  COPD (chronic obstructive pulmonary disease) ([facility] [address]     Depression     Pneumonia 2017 & 2019    twice     Pre-diabetes        FAMILY HISTORY    Family History   Problem Relation Age of Onset    Heart Disease Mother     Diabetes Mother     Heart Attack Mother 36        COD, she had 3 heart attacks     Heart Disease Father     Diabetes Father     Heart Attack Father 50        COD     Diabetes Sister     Hypertension Sister     Atrial Fibrillation Sister     Diabetes type 2  Maternal Grandmother         COD    Heart Disease Maternal Grandfather         COD    Diabetes type 2  Paternal Grandmother     Heart Disease Paternal Grandmother     Diabetes type 2  Paternal Grandfather     Heart Disease Paternal Grandfather     Stroke Brother         COD     Alcohol Abuse Brother     Hypertension Brother     ADHD Brother     Heart Attack Brother 36        several     Heart Attack Paternal Uncle     Pancreatic Cancer Paternal Uncle     Pancreatic Cancer Paternal Uncle        SOCIAL HISTORY    Social History     Socioeconomic History    Marital status:       Spouse name: None    Number of children: None    Years of education: None    Highest education level: None   Occupational History    None   Social Needs    Financial resource strain: None    Food insecurity:     Worry: None     Inability: None    Transportation needs:     Medical: None     Non-medical: None   Tobacco Use    Smoking status: Current Every Day Smoker     Packs/day: 0.50     Years: 35.00     Pack years: 17.50     Types: Cigarettes    Smokeless tobacco: Never Used   Substance and Penicillins        PHYSICAL EXAM   Physical Exam   Constitutional: She is oriented to person, place, and time. Vital signs are normal. She appears well-developed and well-nourished. She is cooperative. No distress. HENT:   Head: Normocephalic. Right Ear: Hearing, tympanic membrane, external ear and ear canal normal.   Left Ear: Hearing, tympanic membrane, external ear and ear canal normal.   Nose: Nose normal. No mucosal edema. Mouth/Throat: Oropharynx is clear and moist and mucous membranes are normal. Abnormal dentition (Absent. Waiting for 2nd denture fitting. ). Eyes: Pupils are equal, round, and reactive to light. Conjunctivae are normal. Right eye exhibits no discharge. Left eye exhibits no discharge. Neck: Neck supple. No thyromegaly present. Cardiovascular: Normal rate, regular rhythm, S1 normal, S2 normal and normal heart sounds. No murmur heard. Pulmonary/Chest: Effort normal and breath sounds normal. No respiratory distress. She has no wheezes. Abdominal: Soft. She exhibits no distension, no fluid wave and no mass. There is no hepatosplenomegaly. There is no tenderness. There is no rigidity and no guarding. No hernia. Lymphadenopathy:     She has no cervical adenopathy. Neurological: She is alert and oriented to person, place, and time. Skin: Skin is warm and dry. No rash noted. She is not diaphoretic. No erythema. Psychiatric: She has a normal mood and affect. Her behavior is normal. Her mood appears not anxious. She does not exhibit a depressed mood. She expresses no homicidal and no suicidal ideation. She expresses no suicidal plans and no homicidal plans. Nursing note and vitals reviewed. Assessment/PLan  1. Chronic obstructive pulmonary disease with acute lower respiratory infection (HCC)    - Respiratory Therapy Supplies (NEBULIZER COMPRESSOR) KIT; 1 kit by Does not apply route once for 1 dose  Dispense: 1 kit;  Refill: 0  - Respiratory Therapy Supplies (NEBULIZER/TUBING/MOUTHPIECE) KIT; 1 kit by Does not apply route daily as needed (wheezing and SOB)  Dispense: 1 kit; Refill: 0  -Continue using nebulized albuterol q 4-6 hours as needed and call office if sx of pneumonia do not continue to improve. -Repeat CT abd for adrenal mass shows improving pneumonia,     2. Pre-diabetes    - Comprehensive Metabolic Panel; Future  - Chronic. Stable. Continue current tx plan. -Will get A1C, Microalbumin and perform diabetic foot exam at follow-up in 6 months. 3. Other fatigue    - CBC With Auto Differential; Future    4. Right adrenal mass (HCC)    -Reviewed repeat CT abd w/o contrast.   -Patient informed of results at her apt today that her adrenal mass is an adenoma that does not require follow-up imagine. 5. Screening for hyperlipidemia    - Lipid Panel; Future    6. Family history of heart disease    - Comprehensive Metabolic Panel; Future  - CBC With Auto Differential; Future  - Magnesium; Future  - Phosphorus; Future  -Given strong family h/o heart disease with early cardiac death in females strongly advised cardiac referral. Patient declines at this time along with any further cardiac testing.   -Will discuss EKG, Cardiac echo and possible stress test at follow-up. 7. Encounter for hepatitis C virus screening test for high risk patient    - Hepatitis C Antibody; Future    8. Screening for breast cancer    - IRMA DIGITAL SCREEN W CAD BILATERAL; Future    9. Colon cancer screening    -Requested records from Dr. Zachariah Balderas. Appears patient has h/o polyps and will need repeat colonoscopy in 10/2019. 10. Cervical cancer screening    -Discussed pap. Patient to come back to our office for PAP and HPV testing. Advised to schedule     Doreen received counseling on the following healthy behaviors: nutrition, exercise and medication adherence  Reviewed prior labs and health maintenance  Continue current medications, diet and exercise.   Discussed use, benefit, and side

## 2019-04-17 NOTE — LETTER
[address] [facility] [address] [place] [address]  Phone: [phone]  Fax: [phone]    Jose R Joseph, APRN - [place]        April 17, 2019     Kirit Johnston  [address] [facility]  [address]  [address]      Dear Mehdi Francis:    Below are the results from your recent visit:    Resulted Orders   CT ABDOMEN WO CONTRAST Additional Contrast? Radiologist Recommendation    Narrative    EXAMINATION:  CT ABDOMEN WITHOUT CONTRAST 4/16/2019 4:49 pm    TECHNIQUE:  CT of the abdomen was performed without the administration of intravenous  contrast. Multiplanar reformatted images are provided for review. Dose  modulation, iterative reconstruction, and/or weight based adjustment of the  mA/kV was utilized to reduce the radiation dose to as low as reasonably  achievable. COMPARISON:  None. HISTORY:  ORDERING SYSTEM PROVIDED HISTORY: Right adrenal mass [facility])  TECHNOLOGIST PROVIDED HISTORY:    right adrenal mass noted on 3/29/19 CT chest    FINDINGS:  Lower Chest: Multiple punctate tree-in-bud nodules in the bases. Organs: Liver, gallbladder, pancreas, and spleen are unremarkable. GI/Bowel: No findings to suggest bowel obstruction. Peritoneum/Retroperitoneum: No abdominal aortic aneurysm. Kidneys are  unremarkable. Right adrenal gland adenoma which measures 1.5 x 1.1 cm. No  follow-up imaging needed. Bones/Soft Tissues: No acute bony abnormality. Impression    1.5 x 1.1 cm right adrenal gland adenoma which requires no further follow-up. Multiple punctate tree-in-bud nodules noted throughout the lower lungs,  improved since the previous exam.  Findings likely represent resolving  infection. Your CT of abdomen shows an adrenal gland adenoma which is small and only on one side. This is almost always a non-cancerous finding. The radiologist does not feel we need to do any follow-up imaging. If you have any questions or concerns, please don't hesitate to call.     Sincerely,        ILA Jay - CNP

## 2019-04-17 NOTE — PATIENT INSTRUCTIONS
Dr. Caron Zamudio  [address] [place] [address] [address], [place], [address] [facility]  Phone: [phone]    Suspecting that you're due for your colonoscopy in October 2019

## 2019-04-17 NOTE — PROGRESS NOTES
Tobacco Use Visit Information:    Have you changed or started any medications since your last visit including any over-the-counter medicines, vitamins, or herbal medicines? no   Are you having any side effects from any of your medications? -  no  Have you stopped taking any of your medications? Is so, why? -  no    Have you seen any other physician or provider since your last visit? No  Have you had any other diagnostic tests since your last visit? CT  Have you been seen in the emergency room and/or had an admission to a hospital since we last saw you? No  Have you had your routine dental cleaning in the past 6 months? yes - February    Have you activated your [employer] account? If not, what are your barriers?  Yes     Patient Care Team:  ILA Gregg - CNP as PCP - General (Nurse Practitioner)    Current Tobacco Use    Social History     Tobacco Use   Smoking Status Former Smoker    Packs/day: 0.50    Years: 35.00    Pack years: 17.50    Types: Cigarettes    Last attempt to quit: 3/15/2019    Years since quittin.0   Smokeless Tobacco Never Used     Counseling given: Not Answered     Are you motivated to quit? - yes  If yes, have you set a date to quite? - yes    Have you tried any anti-smoking aids in the past? -  yes - patches, chantix     -800-QUIT-NOW ([phone])     Medical History Review  Past Medical, Family, and Social History reviewed and does contribute to the patient presenting condition    Health Maintenance   Topic Date Due    Hepatitis C screen  1961    Pneumococcal 0-64 years Vaccine (1 of 1 - PPSV23) 1967    HIV screen  1976    DTaP/Tdap/Td vaccine (1 - Tdap) 1980    Cervical cancer screen  1982    Shingles Vaccine (1 of 2) 2011    Colon Cancer Screen FIT/FOBT  10/01/2014    Breast cancer screen  2018    Flu vaccine (Season Ended) 2019    A1C test (Diabetic or Prediabetic)  2020    Lipid screen  2021

## 2019-04-17 NOTE — TELEPHONE ENCOUNTER
Please call Dr. Winnie Fuller's office for colonosocpy report from 10/2016.     Phone: [phone]

## 2019-05-19 LAB
ABSOLUTE BASO #: 0.1 K/UL (ref 0–0.1)
ABSOLUTE EOS #: 0 K/UL (ref 0.1–0.4)
ABSOLUTE LYMPH #: 1 K/UL (ref 0.8–5.2)
ABSOLUTE MONO #: 0.3 K/UL (ref 0.1–0.9)
ABSOLUTE NEUT #: 2 K/UL (ref 1.3–9.1)
ALBUMIN SERPL-MCNC: 4.5 G/DL (ref 3.2–5.3)
ALK PHOSPHATASE: 78 U/L (ref 39–130)
ALT SERPL-CCNC: 21 U/L (ref 0–31)
ANION GAP SERPL CALCULATED.3IONS-SCNC: 6 MMOL/L (ref 4–12)
AST SERPL-CCNC: 20 U/L (ref 0–41)
BASOPHILS RELATIVE PERCENT: 1.5 %
BILIRUB SERPL-MCNC: 0.4 MG/DL (ref 0.3–1.2)
BUN BLDV-MCNC: 11 MG/DL (ref 5–23)
CALCIUM SERPL-MCNC: 11.3 MG/DL (ref 8.5–10.5)
CHLORIDE BLD-SCNC: 107 MMOL/L (ref 98–109)
CHOLESTEROL/HDL RATIO: 4.5 (ref 1–5)
CHOLESTEROL: 205 MG/DL (ref 150–200)
CO2: 26 MMOL/L (ref 22–32)
CREAT SERPL-MCNC: 0.62 MG/DL (ref 0.4–1)
EGFR AFRICAN AMERICAN: >60 ML/MIN/1.73SQ.M
EGFR IF NONAFRICAN AMERICAN: >60 ML/MIN/1.73SQ.M
EOSINOPHILS RELATIVE PERCENT: 1.2 %
GLUCOSE: 137 MG/DL (ref 65–99)
HCT VFR BLD CALC: 38.1 % (ref 36–48)
HDLC SERPL-MCNC: 46 MG/DL
HEMOGLOBIN: 13.1 G/DL (ref 12–16)
HEPATITIS C ANTIBODY: NORMAL
LDL CHOLESTEROL CALCULATED: 132 MG/DL
LDL/HDL RATIO: 2.9
LYMPHOCYTE %: 28.9 %
MAGNESIUM: 2.1 MG/DL (ref 1.8–2.6)
MCH RBC QN AUTO: 32.7 PG (ref 27–34)
MCHC RBC AUTO-ENTMCNC: 34.4 G/DL (ref 31–36)
MCV RBC AUTO: 95 FL (ref 80–100)
MONOCYTES # BLD: 7.9 %
NEUTROPHILS RELATIVE PERCENT: 60.5 %
PDW BLD-RTO: 12.9 % (ref 10.8–14.8)
PHOSPHORUS: 3.2 MG/DL (ref 2.4–4.9)
PLATELETS: 182 K/UL (ref 150–450)
POTASSIUM SERPL-SCNC: 4.5 MMOL/L (ref 3.5–5)
RBC: 4.01 M/UL (ref 4–5.5)
SODIUM BLD-SCNC: 139 MMOL/L (ref 134–146)
TOTAL PROTEIN: 6.5 G/DL (ref 6–8)
TRIGL SERPL-MCNC: 136 MG/DL (ref 27–150)
VLDLC SERPL CALC-MCNC: 27 MG/DL (ref 0–30)
WBC: 3.3 K/UL (ref 3.7–10.8)

## 2019-05-22 ENCOUNTER — TELEPHONE (OUTPATIENT)
Dept: FAMILY MEDICINE CLINIC | Age: 59
End: 2019-05-22

## 2019-05-22 DIAGNOSIS — E83.52 SERUM CALCIUM ELEVATED: Primary | ICD-10-CM

## 2019-05-22 NOTE — TELEPHONE ENCOUNTER
That is ok. I know that they cannot do the ical since that has to be on ice and run within an hour. Sorry I didn't see that the Ical was attached when I signed the order.

## 2019-05-24 LAB — PARATHYROID HORMONE INTACT: 69 PG/ML (ref 15–65)

## 2019-05-30 ENCOUNTER — TELEPHONE (OUTPATIENT)
Dept: FAMILY MEDICINE CLINIC | Age: 59
End: 2019-05-30

## 2019-05-30 DIAGNOSIS — E34.9 ELEVATED PARATHYROID HORMONE: Primary | ICD-10-CM

## 2019-05-30 DIAGNOSIS — E83.52 HYPERCALCEMIA: ICD-10-CM

## 2019-10-23 ENCOUNTER — OFFICE VISIT (OUTPATIENT)
Dept: FAMILY MEDICINE CLINIC | Age: 59
End: 2019-10-23
Payer: MEDICARE

## 2019-10-23 VITALS
TEMPERATURE: 97.7 F | HEART RATE: 80 BPM | SYSTOLIC BLOOD PRESSURE: 120 MMHG | BODY MASS INDEX: 26.4 KG/M2 | WEIGHT: 184 LBS | DIASTOLIC BLOOD PRESSURE: 72 MMHG

## 2019-10-23 DIAGNOSIS — Z23 FLU VACCINE NEED: ICD-10-CM

## 2019-10-23 DIAGNOSIS — E78.2 MIXED HYPERLIPIDEMIA: ICD-10-CM

## 2019-10-23 DIAGNOSIS — R20.0 NUMBNESS OF RIGHT JAW: ICD-10-CM

## 2019-10-23 DIAGNOSIS — Z23 NEED FOR PNEUMOCOCCAL VACCINE: ICD-10-CM

## 2019-10-23 DIAGNOSIS — J44.0 CHRONIC OBSTRUCTIVE PULMONARY DISEASE WITH ACUTE LOWER RESPIRATORY INFECTION (HCC): Primary | ICD-10-CM

## 2019-10-23 DIAGNOSIS — R41.0 DISORIENTATION: ICD-10-CM

## 2019-10-23 DIAGNOSIS — Z82.49 FAMILY HISTORY OF HEART DISEASE: ICD-10-CM

## 2019-10-23 DIAGNOSIS — E83.52 SERUM CALCIUM ELEVATED: ICD-10-CM

## 2019-10-23 DIAGNOSIS — G45.9 TIA (TRANSIENT ISCHEMIC ATTACK): ICD-10-CM

## 2019-10-23 DIAGNOSIS — Z12.11 COLON CANCER SCREENING: ICD-10-CM

## 2019-10-23 DIAGNOSIS — R73.03 PRE-DIABETES: ICD-10-CM

## 2019-10-23 DIAGNOSIS — E34.9 ELEVATED PARATHYROID HORMONE: ICD-10-CM

## 2019-10-23 PROBLEM — F33.2 MAJOR DEPRESSIVE DISORDER, RECURRENT EPISODE, SEVERE (HCC): Status: ACTIVE | Noted: 2017-05-01

## 2019-10-23 PROBLEM — E78.5 HYPERLIPIDEMIA: Status: ACTIVE | Noted: 2018-05-02

## 2019-10-23 PROBLEM — F33.41 MAJOR DEPRESSIVE DISORDER, RECURRENT, IN PARTIAL REMISSION (HCC): Status: ACTIVE | Noted: 2019-10-23

## 2019-10-23 LAB — HBA1C MFR BLD: 6 %

## 2019-10-23 PROCEDURE — 83036 HEMOGLOBIN GLYCOSYLATED A1C: CPT | Performed by: NURSE PRACTITIONER

## 2019-10-23 PROCEDURE — G0008 ADMIN INFLUENZA VIRUS VAC: HCPCS | Performed by: NURSE PRACTITIONER

## 2019-10-23 PROCEDURE — G0009 ADMIN PNEUMOCOCCAL VACCINE: HCPCS | Performed by: NURSE PRACTITIONER

## 2019-10-23 PROCEDURE — 90686 IIV4 VACC NO PRSV 0.5 ML IM: CPT | Performed by: NURSE PRACTITIONER

## 2019-10-23 PROCEDURE — 99214 OFFICE O/P EST MOD 30 MIN: CPT | Performed by: NURSE PRACTITIONER

## 2019-10-23 PROCEDURE — 90732 PPSV23 VACC 2 YRS+ SUBQ/IM: CPT | Performed by: NURSE PRACTITIONER

## 2019-10-23 RX ORDER — ATORVASTATIN CALCIUM 10 MG/1
10 TABLET, FILM COATED ORAL NIGHTLY
Qty: 30 TABLET | Refills: 5 | Status: SHIPPED | OUTPATIENT
Start: 2019-10-23 | End: 2020-04-15 | Stop reason: SDUPTHER

## 2019-10-23 RX ORDER — FLUOXETINE HYDROCHLORIDE 40 MG/1
2 CAPSULE ORAL DAILY
COMMUNITY
Start: 2019-05-07

## 2019-10-23 ASSESSMENT — ENCOUNTER SYMPTOMS
GASTROINTESTINAL NEGATIVE: 1
VOMITING: 0
NAUSEA: 0
DIARRHEA: 0
COUGH: 0
RESPIRATORY NEGATIVE: 1
EYES NEGATIVE: 1
ABDOMINAL PAIN: 0
CONSTIPATION: 0
SHORTNESS OF BREATH: 0

## 2019-10-23 ASSESSMENT — COPD QUESTIONNAIRES: COPD: 1

## 2019-10-25 ENCOUNTER — TELEPHONE (OUTPATIENT)
Dept: FAMILY MEDICINE CLINIC | Age: 59
End: 2019-10-25

## 2019-10-25 DIAGNOSIS — R05.9 COUGH: ICD-10-CM

## 2019-10-25 DIAGNOSIS — R20.0 NUMBNESS OF RIGHT JAW: Primary | ICD-10-CM

## 2019-10-25 DIAGNOSIS — R41.0 DISORIENTATION: ICD-10-CM

## 2019-10-29 RX ORDER — BENZONATATE 200 MG/1
200 CAPSULE ORAL 3 TIMES DAILY PRN
Qty: 30 CAPSULE | Refills: 1 | Status: SHIPPED | OUTPATIENT
Start: 2019-10-29 | End: 2019-11-18

## 2019-12-03 ENCOUNTER — TELEPHONE (OUTPATIENT)
Dept: FAMILY MEDICINE CLINIC | Age: 59
End: 2019-12-03

## 2019-12-03 DIAGNOSIS — G45.9 TIA (TRANSIENT ISCHEMIC ATTACK): ICD-10-CM

## 2019-12-03 DIAGNOSIS — R20.0 NUMBNESS OF RIGHT JAW: Primary | ICD-10-CM

## 2019-12-03 DIAGNOSIS — R41.0 DISORIENTATION: ICD-10-CM

## 2019-12-06 ENCOUNTER — HOSPITAL ENCOUNTER (OUTPATIENT)
Dept: CT IMAGING | Age: 59
Discharge: HOME | End: 2019-12-08
Payer: MEDICARE

## 2019-12-06 ENCOUNTER — HOSPITAL ENCOUNTER (OUTPATIENT)
Age: 59
Discharge: HOME | End: 2019-12-06
Payer: MEDICARE

## 2019-12-06 ENCOUNTER — HOSPITAL ENCOUNTER (OUTPATIENT)
Dept: NON INVASIVE DIAGNOSTICS | Age: 59
Discharge: HOME | End: 2019-12-06
Payer: MEDICARE

## 2019-12-06 ENCOUNTER — HOSPITAL ENCOUNTER (OUTPATIENT)
Dept: VASCULAR LAB | Age: 59
Discharge: HOME | End: 2019-12-06
Payer: MEDICARE

## 2019-12-06 DIAGNOSIS — R20.0 NUMBNESS OF RIGHT JAW: ICD-10-CM

## 2019-12-06 DIAGNOSIS — R41.0 DISORIENTATION: ICD-10-CM

## 2019-12-06 DIAGNOSIS — G45.9 TIA (TRANSIENT ISCHEMIC ATTACK): ICD-10-CM

## 2019-12-06 DIAGNOSIS — R20.0 NUMBNESS OF RIGHT JAW: Primary | ICD-10-CM

## 2019-12-06 DIAGNOSIS — E83.52 SERUM CALCIUM ELEVATED: ICD-10-CM

## 2019-12-06 DIAGNOSIS — E34.9 ELEVATED PARATHYROID HORMONE: ICD-10-CM

## 2019-12-06 DIAGNOSIS — Z82.49 FAMILY HISTORY OF HEART DISEASE: ICD-10-CM

## 2019-12-06 LAB
BUN BLDV-MCNC: 10 MG/DL (ref 6–20)
CALCIUM IONIZED: 1.47 MMOL/L (ref 1.13–1.33)
CREAT SERPL-MCNC: 0.69 MG/DL (ref 0.5–0.9)
GFR AFRICAN AMERICAN: >60 ML/MIN
GFR NON-AFRICAN AMERICAN: >60 ML/MIN
GFR SERPL CREATININE-BSD FRML MDRD: NORMAL ML/MIN/{1.73_M2}
GFR SERPL CREATININE-BSD FRML MDRD: NORMAL ML/MIN/{1.73_M2}
LV EF: 60 %
LVEF MODALITY: NORMAL
PTH INTACT: 100.8 PG/ML (ref 15–65)

## 2019-12-06 PROCEDURE — 6360000004 HC RX CONTRAST MEDICATION: Performed by: NURSE PRACTITIONER

## 2019-12-06 PROCEDURE — 82330 ASSAY OF CALCIUM: CPT

## 2019-12-06 PROCEDURE — 84520 ASSAY OF UREA NITROGEN: CPT

## 2019-12-06 PROCEDURE — 83970 ASSAY OF PARATHORMONE: CPT

## 2019-12-06 PROCEDURE — 2580000003 HC RX 258: Performed by: NURSE PRACTITIONER

## 2019-12-06 PROCEDURE — 82565 ASSAY OF CREATININE: CPT

## 2019-12-06 PROCEDURE — 93306 TTE W/DOPPLER COMPLETE: CPT

## 2019-12-06 PROCEDURE — 36415 COLL VENOUS BLD VENIPUNCTURE: CPT

## 2019-12-06 PROCEDURE — 70470 CT HEAD/BRAIN W/O & W/DYE: CPT

## 2019-12-06 PROCEDURE — 93880 EXTRACRANIAL BILAT STUDY: CPT

## 2019-12-06 RX ORDER — SODIUM CHLORIDE 0.9 % (FLUSH) 0.9 %
10 SYRINGE (ML) INJECTION
Status: COMPLETED | OUTPATIENT
Start: 2019-12-06 | End: 2019-12-06

## 2019-12-06 RX ORDER — 0.9 % SODIUM CHLORIDE 0.9 %
80 INTRAVENOUS SOLUTION INTRAVENOUS ONCE
Status: COMPLETED | OUTPATIENT
Start: 2019-12-06 | End: 2019-12-06

## 2019-12-06 RX ADMIN — SODIUM CHLORIDE 80 ML: 9 INJECTION, SOLUTION INTRAVENOUS at 13:17

## 2019-12-06 RX ADMIN — IOPAMIDOL 75 ML: 755 INJECTION, SOLUTION INTRAVENOUS at 13:17

## 2019-12-06 RX ADMIN — Medication 10 ML: at 13:17

## 2019-12-10 ENCOUNTER — TELEPHONE (OUTPATIENT)
Dept: FAMILY MEDICINE CLINIC | Age: 59
End: 2019-12-10

## 2019-12-10 DIAGNOSIS — E21.3 HYPERPARATHYROIDISM (HCC): ICD-10-CM

## 2019-12-10 DIAGNOSIS — E83.52 HYPERCALCEMIA: Primary | ICD-10-CM

## 2019-12-12 ENCOUNTER — TELEPHONE (OUTPATIENT)
Dept: FAMILY MEDICINE CLINIC | Age: 59
End: 2019-12-12

## 2019-12-12 DIAGNOSIS — E21.3 HYPERPARATHYROIDISM (HCC): Primary | ICD-10-CM

## 2019-12-12 DIAGNOSIS — E83.52 HYPERCALCEMIA: ICD-10-CM

## 2020-02-03 ENCOUNTER — OFFICE VISIT (OUTPATIENT)
Dept: FAMILY MEDICINE CLINIC | Age: 60
End: 2020-02-03
Payer: MEDICARE

## 2020-02-03 VITALS
SYSTOLIC BLOOD PRESSURE: 100 MMHG | WEIGHT: 186 LBS | DIASTOLIC BLOOD PRESSURE: 62 MMHG | BODY MASS INDEX: 26.69 KG/M2 | HEART RATE: 60 BPM

## 2020-02-03 PROCEDURE — 99213 OFFICE O/P EST LOW 20 MIN: CPT | Performed by: NURSE PRACTITIONER

## 2020-02-03 ASSESSMENT — ENCOUNTER SYMPTOMS
RESPIRATORY NEGATIVE: 1
ABDOMINAL PAIN: 0
GASTROINTESTINAL NEGATIVE: 1

## 2020-02-06 ENCOUNTER — OFFICE VISIT (OUTPATIENT)
Dept: DERMATOLOGY | Age: 60
End: 2020-02-06
Payer: MEDICARE

## 2020-02-06 VITALS
OXYGEN SATURATION: 96 % | WEIGHT: 184 LBS | SYSTOLIC BLOOD PRESSURE: 111 MMHG | HEIGHT: 70 IN | BODY MASS INDEX: 26.34 KG/M2 | HEART RATE: 71 BPM | DIASTOLIC BLOOD PRESSURE: 75 MMHG

## 2020-02-06 PROCEDURE — G8419 CALC BMI OUT NRM PARAM NOF/U: HCPCS | Performed by: DERMATOLOGY

## 2020-02-06 PROCEDURE — G8427 DOCREV CUR MEDS BY ELIG CLIN: HCPCS | Performed by: DERMATOLOGY

## 2020-02-06 PROCEDURE — 3017F COLORECTAL CA SCREEN DOC REV: CPT | Performed by: DERMATOLOGY

## 2020-02-06 PROCEDURE — G8482 FLU IMMUNIZE ORDER/ADMIN: HCPCS | Performed by: DERMATOLOGY

## 2020-02-06 PROCEDURE — 99202 OFFICE O/P NEW SF 15 MIN: CPT | Performed by: DERMATOLOGY

## 2020-02-06 PROCEDURE — 4004F PT TOBACCO SCREEN RCVD TLK: CPT | Performed by: DERMATOLOGY

## 2020-02-06 NOTE — PROGRESS NOTES
Dermatology Patient Note  [facility] PHYSICIANS  [facility] D[facility]  [address] [facility] [address]  Dept: [phone]  Dept Fax: [phone]: 2/6/2020   REFERRING PROVIDER: No ref. provider found      Karen Luong is a 63 y.o. female  who presents today in the office for:    New Patient (right breast has a mole that is getting bigger.)      HISTORY OF PRESENT ILLNESS:  63 y.o. female presenting for lesion  Location: right breast  Duration: months  Symptoms: none  Course: enlarging  Exacerbating factors: none  Prior treatments: none      CURRENT MEDICATIONS:   Current Outpatient Medications   Medication Sig Dispense Refill    FLUoxetine (PROZAC) 40 MG capsule Take 2 capsules by mouth daily       atorvastatin (LIPITOR) 10 MG tablet Take 1 tablet by mouth nightly 30 tablet 5    albuterol (PROVENTIL) (2.5 MG/3ML) 0.083% nebulizer solution Take 3 mLs by nebulization every 2 hours as needed for Wheezing 120 each 3    famotidine (PEPCID) 20 MG tablet Take 1 tablet by mouth 2 times daily 60 tablet 3    albuterol sulfate HFA (VENTOLIN HFA) 108 (90 Base) MCG/ACT inhaler Inhale 2 puffs into the lungs every 6 hours as needed for Wheezing 1 Inhaler 3    aspirin 81 MG tablet Take 81 mg by mouth daily       No current facility-administered medications for this visit.         ALLERGIES:   Allergies   Allergen Reactions    Cephalosporins Anaphylaxis    Persantine [Dipyridamole] Hives    Ceftin [Cefuroxime Axetil]      Pt unable to recall type of reaction she had from ceftin    Penicillins        SOCIAL HISTORY:  Social History     Tobacco Use    Smoking status: Current Every Day Smoker     Packs/day: 0.50     Years: 35.00     Pack years: 17.50     Types: Cigarettes    Smokeless tobacco: Never Used   Substance Use Topics    Alcohol use: No       REVIEW OF SYSTEMS:  Review of Systems  Skin: Denies any new changing, growing orbleeding lesions or rashes except as described in the HPI Constitutional: Denies fevers, chills, and malaise. PHYSICAL EXAM:   /75 (Site: Left Upper Arm, Position: Sitting, Cuff Size: Medium Adult)   Pulse 71   Ht 5' 10\" (1.778 m)   Wt 184 lb (83.5 kg)   SpO2 96%   BMI 26.40 kg/m²     General Exam:  General Appearance: No acute distress, Well nourished     Neuro: Alert and oriented to person, place and time  Psych: Normal affect   Lymph Node: Not performed    Cutaneous Exam: Performed as documented in clinic note below. Waist-up skin, which includes the head/face,neck, both arms, chest, back, abdomen, digits and/or nails, was examined. Pertinent Physical Exam Findings:  Physical Exam  Crusted tan to brown stuck-on papules on trunk and extremities  Cherry red papules on trunk  Light brown macules on face, arms, shoulders, and dorsal hands    Photo surveillance performed: No    Medical Necessity of Exam Performed:   Distribution of patient concerns    Additional Diagnostic Testing performed during exam: Not performed ,  Not performed    ASSESSMENT:   Diagnosis Orders   1. Seborrheic keratoses     2. Cherry angioma     3. Solar lentigo         Plan of Action is as Follows:  Assessment   1. Seborrheic keratoses  reassurance and education    2. Cherry angioma  reassurance and education    3. Solar lentigo  Counseled on sun protection: avoidance, seek shade; use clothing/hats/scarves; use generous quantity of sunscreen, re-apply every 2 hours. RTC 1 year            There are no Patient Instructions on file for this visit. Follow-up: No follow-ups on file. This note was created with the assistance of a speech-recognition program.  Although the intention is to generate a document that actually reflects the content of the visit, no guarantees can be provided that every mistake has been identified and corrected byediting.     Electronically signed by Chely Reynaga MD on 2/6/20 at 1:51 PM

## 2020-02-06 NOTE — PROGRESS NOTES
Future Appointments   Date Time Provider Jeremías Wilson   2/6/2020  1:45 PM Lucy Parish MD  derm [facility]   4/24/2020  2:15 PM ILA Cotton - CNP [address] [facility]   7/20/2020  4:15 PM [facility] [address]
 Heart Attack Father 50        COD     Diabetes Sister     Hypertension Sister     Atrial Fibrillation Sister     Diabetes type 2  Maternal Grandmother         COD    Heart Disease Maternal Grandfather         COD    Diabetes type 2  Paternal Grandmother     Heart Disease Paternal Grandmother     Diabetes type 2  Paternal Grandfather     Heart Disease Paternal Grandfather     Stroke Brother         COD     Alcohol Abuse Brother     Hypertension Brother     ADHD Brother     Heart Attack Brother 36        several     Heart Attack Paternal Uncle     Pancreatic Cancer Paternal Uncle     Pancreatic Cancer Paternal Uncle        SOCIAL HISTORY    Social History     Socioeconomic History    Marital status:       Spouse name: None    Number of children: None    Years of education: None    Highest education level: None   Occupational History    None   Social Needs    Financial resource strain: None    Food insecurity:     Worry: None     Inability: None    Transportation needs:     Medical: None     Non-medical: None   Tobacco Use    Smoking status: Current Every Day Smoker     Packs/day: 0.50     Years: 35.00     Pack years: 17.50     Types: Cigarettes    Smokeless tobacco: Never Used   Substance and Sexual Activity    Alcohol use: No    Drug use: No    Sexual activity: None   Lifestyle    Physical activity:     Days per week: None     Minutes per session: None    Stress: None   Relationships    Social connections:     Talks on phone: None     Gets together: None     Attends Zoroastrianism service: None     Active member of club or organization: None     Attends meetings of clubs or organizations: None     Relationship status: None    Intimate partner violence:     Fear of current or ex partner: None     Emotionally abused: None     Physically abused: None     Forced sexual activity: None   Other Topics Concern    None   Social History Narrative    None       SURGICAL HISTORY    Past

## 2020-03-25 PROBLEM — J44.9 COPD (CHRONIC OBSTRUCTIVE PULMONARY DISEASE) (HCC): Status: RESOLVED | Noted: 2020-03-25 | Resolved: 2020-03-24

## 2020-04-15 RX ORDER — ATORVASTATIN CALCIUM 10 MG/1
10 TABLET, FILM COATED ORAL NIGHTLY
Qty: 30 TABLET | Refills: 2 | Status: SHIPPED | OUTPATIENT
Start: 2020-04-15 | End: 2020-11-16

## 2020-08-19 ENCOUNTER — OFFICE VISIT (OUTPATIENT)
Dept: FAMILY MEDICINE CLINIC | Age: 60
End: 2020-08-19
Payer: MEDICARE

## 2020-08-19 VITALS
DIASTOLIC BLOOD PRESSURE: 70 MMHG | BODY MASS INDEX: 26.77 KG/M2 | OXYGEN SATURATION: 99 % | WEIGHT: 187 LBS | TEMPERATURE: 97.7 F | HEART RATE: 79 BPM | SYSTOLIC BLOOD PRESSURE: 105 MMHG | HEIGHT: 70 IN

## 2020-08-19 PROCEDURE — 99215 OFFICE O/P EST HI 40 MIN: CPT | Performed by: NURSE PRACTITIONER

## 2020-08-19 RX ORDER — BUDESONIDE AND FORMOTEROL FUMARATE DIHYDRATE 160; 4.5 UG/1; UG/1
2 AEROSOL RESPIRATORY (INHALATION) 2 TIMES DAILY
Qty: 1 INHALER | Refills: 5 | COMMUNITY
Start: 2020-08-19 | End: 2020-09-21 | Stop reason: SDUPTHER

## 2020-08-19 RX ORDER — ALBUTEROL SULFATE 90 UG/1
2 AEROSOL, METERED RESPIRATORY (INHALATION) EVERY 6 HOURS PRN
Qty: 1 INHALER | Refills: 3 | Status: SHIPPED | OUTPATIENT
Start: 2020-08-19 | End: 2020-11-03 | Stop reason: ALTCHOICE

## 2020-08-19 ASSESSMENT — ENCOUNTER SYMPTOMS
GASTROINTESTINAL NEGATIVE: 1
COUGH: 1
NAUSEA: 0
DIARRHEA: 0
BLOOD IN STOOL: 0
VOMITING: 0
CONSTIPATION: 0
ABDOMINAL PAIN: 0
EYES NEGATIVE: 1
SHORTNESS OF BREATH: 1

## 2020-08-19 NOTE — PROGRESS NOTES
[facility]  [facility]  [address]  [facility] [address]  Dept: [phone]    8/19/2020    CHIEF COMPLAINT    Chief Complaint   Patient presents with    Fatigue       HPI    Charlotte Jordan is a 63 y.o. female who presents   Chief Complaint   Patient presents with    Fatigue     Appointment to discuss increased fatigue x 2 months. Fatigue- Noting that she has less stamina, increased sleeping (18-20 hrs per day) with naps and at night. She wakes up at 7 am takes her meds and dose a couple chores. When she sits down she falls asleep for \"quite a while, sometimes up to 4 hours\". Dysphagia- worsening since first noted in 2016. Choking on liquids and solids. She had a video swallow study in 2016 that was WNL  She indicates that she has been told she has a bone spur that could be contributing to her dysphagia, but not testing could be found showing this. Heart Palpitations -Admits to one episode of this that woke her up at night 5-6 weeks ago. Symptoms resolved after 4-5 minutes after sitting up. Denies chest pain or recurrence      Vitals:    08/19/20 1130   BP: 105/70   Site: Left Upper Arm   Position: Sitting   Cuff Size: Medium Adult   Pulse: 79   Temp: 97.7 °F (36.5 °C)   TempSrc: Temporal   SpO2: 99%   Weight: 187 lb (84.8 kg)   Height: 5' 10\" (1.778 m)       Wt Readings from Last 3 Encounters:   08/19/20 187 lb (84.8 kg)   02/06/20 184 lb (83.5 kg)   02/03/20 186 lb (84.4 kg)     BP Readings from Last 3 Encounters:   08/19/20 105/70   02/06/20 111/75   02/03/20 100/62       REVIEW OF SYSTEMS    Review of Systems   Constitutional: Positive for diaphoresis (Night sweats, stable since menopause ) and fatigue. Negative for chills and fever. Eyes: Negative. Negative for visual disturbance. Respiratory: Positive for cough and shortness of breath (Increased SOB with exertion ). Cardiovascular: Positive for palpitations (See HPI  ). Negative for chest pain. Gastrointestinal: Negative. Negative for abdominal pain, blood in stool, constipation, diarrhea, nausea and vomiting. Genitourinary: Negative. Skin: Negative. Neurological: Negative. Negative for dizziness and headaches. Psychiatric/Behavioral: The patient is nervous/anxious ( Increased anxiety about health with increased exhaustion). PAST MEDICAL HISTORY    Past Medical History:   Diagnosis Date    Adenoma of right adrenal gland     Stable 4/2019. No repeat imaging advised.  COPD (chronic obstructive pulmonary disease) ([address]     Depression     Pneumonia 2017 & 2019    twice     Pre-diabetes        FAMILY HISTORY    Family History   Problem Relation Age of Onset    Heart Disease Mother     Diabetes Mother     Heart Attack Mother 36        COD, she had 3 heart attacks     Heart Disease Father     Diabetes Father     Heart Attack Father 50        COD     Diabetes Sister     Hypertension Sister     Atrial Fibrillation Sister     Diabetes type 2  Maternal Grandmother         COD    Heart Disease Maternal Grandfather         COD    Diabetes type 2  Paternal Grandmother     Heart Disease Paternal Grandmother     Diabetes type 2  Paternal Grandfather     Heart Disease Paternal Grandfather     Stroke Brother         COD     Alcohol Abuse Brother     Hypertension Brother     ADHD Brother     Heart Attack Brother 36        several     Heart Attack Paternal Uncle     Pancreatic Cancer Paternal Uncle     Pancreatic Cancer Paternal Uncle        SOCIAL HISTORY    Social History     Socioeconomic History    Marital status:       Spouse name: None    Number of children: None    Years of education: None    Highest education level: None   Occupational History    None   Social Needs    Financial resource strain: None    Food insecurity     Worry: None     Inability: None    Transportation needs     Medical: None     Non-medical: None   Tobacco Use    Smoking status: Current Every Day Smoker     Packs/day: 0.50     Years: 35.00     Pack years: 17.50     Types: Cigarettes    Smokeless tobacco: Never Used   Substance and Sexual Activity    Alcohol use: No    Drug use: No    Sexual activity: None   Lifestyle    Physical activity     Days per week: None     Minutes per session: None    Stress: None   Relationships    Social connections     Talks on phone: None     Gets together: None     Attends Gnosticist service: None     Active member of club or organization: None     Attends meetings of clubs or organizations: None     Relationship status: None    Intimate partner violence     Fear of current or ex partner: None     Emotionally abused: None     Physically abused: None     Forced sexual activity: None   Other Topics Concern    None   Social History Narrative    None       SURGICAL HISTORY    Past Surgical History:   Procedure Laterality Date    CATARACT REMOVAL Bilateral     LUMBAR LAMINECTOMY  1991    TOOTH EXTRACTION  2019       CURRENT MEDICATIONS    Current Outpatient Medications   Medication Sig Dispense Refill    albuterol sulfate HFA (VENTOLIN HFA) 108 (90 Base) MCG/ACT inhaler Inhale 2 puffs into the lungs every 6 hours as needed for Wheezing 1 Inhaler 3    Handicap Placard MISC by Does not apply route Expires in 5 years ( 2025 ) 1 each 0    budesonide-formoterol (SYMBICORT) 160-4.5 MCG/ACT AERO Inhale 2 puffs into the lungs 2 times daily 1 Inhaler 5    atorvastatin (LIPITOR) 10 MG tablet Take 1 tablet by mouth nightly 30 tablet 2    FLUoxetine (PROZAC) 40 MG capsule Take 2 capsules by mouth daily       albuterol (PROVENTIL) (2.5 MG/3ML) 0.083% nebulizer solution Take 3 mLs by nebulization every 2 hours as needed for Wheezing 120 each 3    famotidine (PEPCID) 20 MG tablet Take 1 tablet by mouth 2 times daily 60 tablet 3    aspirin 81 MG tablet Take 81 mg by mouth daily       No current facility-administered medications for this sulfate HFA (VENTOLIN HFA) 108 (90 Base) MCG/ACT inhaler; Inhale 2 puffs into the lungs every 6 hours as needed for Wheezing  Dispense: 1 Inhaler; Refill: 3  - Handicap Placard MISC; by Does not apply route Expires in 5 years ( 2025 )  Dispense: 1 each; Refill: 0  - budesonide-formoterol (SYMBICORT) 160-4.5 MCG/ACT AERO; Inhale 1 puffs into the lungs 2 times daily  Dispense: 1 Inhaler; Refill: 5  -Add Symbicort. Patient advised to swish and spit mouth after each use. Advised to use daily  --Discussed use, benefit, and side effects of prescribed medications. Barriers to medication compliance addressed. 3. SOB (shortness of breath) on exertion    - CBC Auto Differential; Future  - Brain Natriuretic Peptide; Future  - D-Dimer, Quantitative; Future  - XR CHEST (2 VW); Future  - Iron; Future  -Labs ordered to rule out potential causes of fatigue and shortness of breath. See above    4. Other dysphagia    - XR CHEST (2 VW); Future  - Isadora King MD, Gastroenterology, [address]  -The patient recently aspirated a pea, she has no fevers or other signs of aspiration pneumonia.  -We will check chest x-ray for this reason and refer to GI for possible endoscopy. 5. Hyperparathyroidism ([address]  6. Hypercalcemia    - PTH, Intact With Ionized Calcium; Future  - Yamileth Hollins MD, Endocrinology, [place]  - Vitamin D 25 Hydroxy; Future  -Patient was referred to [facility] endocrinology. She indicates that she waited 6 months for a new patient appointment and then it was canceled due to his longterm.   -Referral to endocrine diabetes care center provided. Patient advised to get above labs done at hospital given need for iCal to be on ice    Lab Results   Component Value Date    PTH 69 (H) 05/18/2019    CALCIUM 11.3 (H) 05/18/2019    CAION 1.47 (H) 12/06/2019    PHOS 3.2 05/18/2019     7. Other somatoform disorders     - Iron; Future  - Ferritin;  Future  -See above      Mel Coleman received counseling on the following healthy behaviors: nutrition, exercise, smoking cessation and medication adherence  Reviewed prior labs and health maintenance. Continue current medications, diet and exercise. Discussed use, benefit, and side effects of prescribed medications. Barriers to medication compliance addressed. Patient given educational materials - see patient instructions. All patient questions answered. Patient voiced understanding. Return in about 1 month (around 9/19/2020) for Shortness of breath and fatigue.     (Please note that portions of this note were completed with a voice recognition program. Efforts were made to edit the dictations but occasionally words are mis-transcribed.)      Electronically signed by Janora Goodpasture, APRN - CNP on 8/19/20 at 11:38 AM EDT

## 2020-08-26 ENCOUNTER — TELEPHONE (OUTPATIENT)
Dept: GASTROENTEROLOGY | Age: 60
End: 2020-08-26

## 2020-08-26 NOTE — TELEPHONE ENCOUNTER
Patient called office to schedule from her referral.  Appointment is on 9/30/20 3 pm at the [facility]. NP packet mailed. Writer thanked and call ended.

## 2020-08-27 ENCOUNTER — HOSPITAL ENCOUNTER (OUTPATIENT)
Age: 60
Discharge: HOME | End: 2020-08-27
Payer: MEDICAID

## 2020-08-27 ENCOUNTER — HOSPITAL ENCOUNTER (OUTPATIENT)
Dept: GENERAL RADIOLOGY | Age: 60
Discharge: HOME | End: 2020-08-29
Payer: MEDICAID

## 2020-08-27 ENCOUNTER — HOSPITAL ENCOUNTER (OUTPATIENT)
Age: 60
Discharge: HOME | End: 2020-08-29
Payer: MEDICAID

## 2020-08-27 LAB
ABSOLUTE EOS #: 0.06 K/UL (ref 0–0.44)
ABSOLUTE IMMATURE GRANULOCYTE: <0.03 K/UL (ref 0–0.3)
ABSOLUTE LYMPH #: 1.18 K/UL (ref 1.1–3.7)
ABSOLUTE MONO #: 0.35 K/UL (ref 0.1–1.2)
ALBUMIN SERPL-MCNC: 4.4 G/DL (ref 3.5–5.2)
ALBUMIN/GLOBULIN RATIO: 2 (ref 1–2.5)
ALP BLD-CCNC: 96 U/L (ref 35–104)
ALT SERPL-CCNC: 22 U/L (ref 5–33)
ANION GAP SERPL CALCULATED.3IONS-SCNC: 11 MMOL/L (ref 9–17)
AST SERPL-CCNC: 20 U/L
BASOPHILS # BLD: 1 % (ref 0–2)
BASOPHILS ABSOLUTE: 0.05 K/UL (ref 0–0.2)
BILIRUB SERPL-MCNC: 0.3 MG/DL (ref 0.3–1.2)
BNP INTERPRETATION: NORMAL
BUN BLDV-MCNC: 12 MG/DL (ref 6–20)
BUN/CREAT BLD: ABNORMAL (ref 9–20)
CALCIUM IONIZED: 1.45 MMOL/L (ref 1.13–1.33)
CALCIUM SERPL-MCNC: 10.4 MG/DL (ref 8.6–10.4)
CHLORIDE BLD-SCNC: 103 MMOL/L (ref 98–107)
CO2: 24 MMOL/L (ref 20–31)
CREAT SERPL-MCNC: 0.61 MG/DL (ref 0.5–0.9)
D-DIMER QUANTITATIVE: 2.45 MG/L FEU (ref 0–0.59)
DIFFERENTIAL TYPE: ABNORMAL
EOSINOPHILS RELATIVE PERCENT: 2 % (ref 1–4)
FERRITIN: 122 UG/L (ref 13–150)
GFR AFRICAN AMERICAN: >60 ML/MIN
GFR NON-AFRICAN AMERICAN: >60 ML/MIN
GFR SERPL CREATININE-BSD FRML MDRD: ABNORMAL ML/MIN/{1.73_M2}
GFR SERPL CREATININE-BSD FRML MDRD: ABNORMAL ML/MIN/{1.73_M2}
GLUCOSE BLD-MCNC: 146 MG/DL (ref 70–99)
HCT VFR BLD CALC: 43.2 % (ref 36.3–47.1)
HEMOGLOBIN: 14 G/DL (ref 11.9–15.1)
IMMATURE GRANULOCYTES: 0 %
IRON: 77 UG/DL (ref 37–145)
LYMPHOCYTES # BLD: 30 % (ref 24–43)
MAGNESIUM: 2 MG/DL (ref 1.6–2.6)
MCH RBC QN AUTO: 31.9 PG (ref 25.2–33.5)
MCHC RBC AUTO-ENTMCNC: 32.4 G/DL (ref 28.4–34.8)
MCV RBC AUTO: 98.4 FL (ref 82.6–102.9)
MONOCYTES # BLD: 9 % (ref 3–12)
NRBC AUTOMATED: 0 PER 100 WBC
PDW BLD-RTO: 11.5 % (ref 11.8–14.4)
PLATELET # BLD: 178 K/UL (ref 138–453)
PLATELET ESTIMATE: ABNORMAL
PMV BLD AUTO: 11.1 FL (ref 8.1–13.5)
POTASSIUM SERPL-SCNC: 4.9 MMOL/L (ref 3.7–5.3)
PRO-BNP: 33 PG/ML
PTH INTACT: 105.2 PG/ML (ref 15–65)
RBC # BLD: 4.39 M/UL (ref 3.95–5.11)
RBC # BLD: ABNORMAL 10*6/UL
SEG NEUTROPHILS: 58 % (ref 36–65)
SEGMENTED NEUTROPHILS ABSOLUTE COUNT: 2.35 K/UL (ref 1.5–8.1)
SODIUM BLD-SCNC: 138 MMOL/L (ref 135–144)
T3 FREE: 3.45 PG/ML (ref 2.02–4.43)
THYROXINE, FREE: 1.23 NG/DL (ref 0.93–1.7)
TOTAL PROTEIN: 6.6 G/DL (ref 6.4–8.3)
TSH SERPL DL<=0.05 MIU/L-ACNC: 1.95 MIU/L (ref 0.3–5)
VITAMIN D 25-HYDROXY: 31 NG/ML (ref 30–100)
WBC # BLD: 4 K/UL (ref 3.5–11.3)
WBC # BLD: ABNORMAL 10*3/UL

## 2020-08-27 PROCEDURE — 36415 COLL VENOUS BLD VENIPUNCTURE: CPT

## 2020-08-27 PROCEDURE — 85025 COMPLETE CBC W/AUTO DIFF WBC: CPT

## 2020-08-27 PROCEDURE — 80053 COMPREHEN METABOLIC PANEL: CPT

## 2020-08-27 PROCEDURE — 82330 ASSAY OF CALCIUM: CPT

## 2020-08-27 PROCEDURE — 84439 ASSAY OF FREE THYROXINE: CPT

## 2020-08-27 PROCEDURE — 84481 FREE ASSAY (FT-3): CPT

## 2020-08-27 PROCEDURE — 82306 VITAMIN D 25 HYDROXY: CPT

## 2020-08-27 PROCEDURE — 83540 ASSAY OF IRON: CPT

## 2020-08-27 PROCEDURE — 84443 ASSAY THYROID STIM HORMONE: CPT

## 2020-08-27 PROCEDURE — 83970 ASSAY OF PARATHORMONE: CPT

## 2020-08-27 PROCEDURE — 71046 X-RAY EXAM CHEST 2 VIEWS: CPT

## 2020-08-27 PROCEDURE — 82728 ASSAY OF FERRITIN: CPT

## 2020-08-27 PROCEDURE — 85379 FIBRIN DEGRADATION QUANT: CPT

## 2020-08-27 PROCEDURE — 83735 ASSAY OF MAGNESIUM: CPT

## 2020-08-27 PROCEDURE — 83880 ASSAY OF NATRIURETIC PEPTIDE: CPT

## 2020-08-31 ENCOUNTER — TELEPHONE (OUTPATIENT)
Dept: FAMILY MEDICINE CLINIC | Age: 60
End: 2020-08-31

## 2020-08-31 ENCOUNTER — HOSPITAL ENCOUNTER (OUTPATIENT)
Dept: CT IMAGING | Age: 60
Discharge: HOME | End: 2020-09-02
Payer: MEDICARE

## 2020-08-31 PROCEDURE — 71260 CT THORAX DX C+: CPT

## 2020-08-31 PROCEDURE — 2580000003 HC RX 258: Performed by: NURSE PRACTITIONER

## 2020-08-31 PROCEDURE — 6360000004 HC RX CONTRAST MEDICATION: Performed by: NURSE PRACTITIONER

## 2020-08-31 RX ORDER — SODIUM CHLORIDE 0.9 % (FLUSH) 0.9 %
10 SYRINGE (ML) INJECTION PRN
Status: DISCONTINUED | OUTPATIENT
Start: 2020-08-31 | End: 2020-09-03 | Stop reason: HOSPADM

## 2020-08-31 RX ORDER — 0.9 % SODIUM CHLORIDE 0.9 %
80 INTRAVENOUS SOLUTION INTRAVENOUS ONCE
Status: COMPLETED | OUTPATIENT
Start: 2020-08-31 | End: 2020-08-31

## 2020-08-31 RX ADMIN — IOVERSOL 75 ML: 741 INJECTION INTRA-ARTERIAL; INTRAVENOUS at 14:07

## 2020-08-31 RX ADMIN — Medication 10 ML: at 14:08

## 2020-08-31 RX ADMIN — SODIUM CHLORIDE 80 ML: 9 INJECTION, SOLUTION INTRAVENOUS at 14:08

## 2020-08-31 NOTE — TELEPHONE ENCOUNTER
Spoke to patient regarding her results. Advised stat CTA of the chest will be ordered given her elevated d-dimer. Patient reports she still feeling extremely fatigued and short of breath. Discussed other labs with patient. Parathyroid hormone remains elevated. She has not heard from endocrine.   Will re-print and fax referral along with lab work and office notes from August 19 th.     [facility] working on getting CTA of chest scheduled ASAP/stat

## 2020-09-01 ENCOUNTER — TELEPHONE (OUTPATIENT)
Dept: FAMILY MEDICINE CLINIC | Age: 60
End: 2020-09-01

## 2020-09-01 NOTE — TELEPHONE ENCOUNTER
PFT ordered. Please notify patient. She may call [phone] to schedule at [facility].

## 2020-09-01 NOTE — TELEPHONE ENCOUNTER
Patient called stating that she needs a PFT before she can see pulmonology.  [facility] would be best for her to get the testing done at

## 2020-09-14 ENCOUNTER — HOSPITAL ENCOUNTER (OUTPATIENT)
Dept: PREADMISSION TESTING | Age: 60
Setting detail: SPECIMEN
Discharge: HOME | End: 2020-09-18
Payer: MEDICARE

## 2020-09-14 PROCEDURE — U0003 INFECTIOUS AGENT DETECTION BY NUCLEIC ACID (DNA OR RNA); SEVERE ACUTE RESPIRATORY SYNDROME CORONAVIRUS 2 (SARS-COV-2) (CORONAVIRUS DISEASE [COVID-19]), AMPLIFIED PROBE TECHNIQUE, MAKING USE OF HIGH THROUGHPUT TECHNOLOGIES AS DESCRIBED BY CMS-2020-01-R: HCPCS

## 2020-09-15 LAB — SARS-COV-2, NAA: NOT DETECTED

## 2020-09-16 ENCOUNTER — TELEPHONE (OUTPATIENT)
Dept: PRIMARY CARE CLINIC | Age: 60
End: 2020-09-16

## 2020-09-18 ENCOUNTER — HOSPITAL ENCOUNTER (OUTPATIENT)
Dept: NEUROLOGY | Age: 60
Discharge: HOME | End: 2020-09-18
Payer: MEDICARE

## 2020-09-18 LAB
DLCO %PRED: 63 %
DLCO PRED: NORMAL
DLCO/VA %PRED: NORMAL
DLCO/VA PRED: NORMAL
DLCO/VA: NORMAL
DLCO: NORMAL
EXPIRATORY TIME-POST: NORMAL
EXPIRATORY TIME: NORMAL
FEF 25-75% %CHNG: NORMAL
FEF 25-75% %PRED-POST: NORMAL
FEF 25-75% %PRED-PRE: NORMAL
FEF 25-75% PRED: NORMAL
FEF 25-75%-POST: NORMAL
FEF 25-75%-PRE: NORMAL
FEV1 %PRED-POST: 64 %
FEV1 %PRED-PRE: 59 %
FEV1 PRED: NORMAL
FEV1-POST: NORMAL
FEV1-PRE: NORMAL
FEV1/FVC %PRED-POST: NORMAL
FEV1/FVC %PRED-PRE: NORMAL
FEV1/FVC PRED: NORMAL
FEV1/FVC-POST: 64 %
FEV1/FVC-PRE: 66 %
FVC %PRED-POST: NORMAL
FVC %PRED-PRE: NORMAL
FVC PRED: NORMAL
FVC-POST: NORMAL
FVC-PRE: NORMAL
GAW %PRED: NORMAL
GAW PRED: NORMAL
GAW: NORMAL
IC %PRED: NORMAL
IC PRED: NORMAL
IC: NORMAL
MEP: NORMAL
MIP: NORMAL
MVV %PRED-PRE: NORMAL
MVV PRED: NORMAL
MVV-PRE: NORMAL
PEF %PRED-POST: NORMAL
PEF %PRED-PRE: NORMAL
PEF PRED: NORMAL
PEF%CHNG: NORMAL
PEF-POST: NORMAL
PEF-PRE: NORMAL
RAW %PRED: NORMAL
RAW PRED: NORMAL
RAW: NORMAL
RV %PRED: NORMAL
RV PRED: NORMAL
RV: NORMAL
SVC %PRED: NORMAL
SVC PRED: NORMAL
SVC: NORMAL
TLC %PRED: 121 %
TLC PRED: NORMAL
TLC: NORMAL
VA %PRED: NORMAL
VA PRED: NORMAL
VA: NORMAL
VTG %PRED: NORMAL
VTG PRED: NORMAL
VTG: NORMAL

## 2020-09-18 PROCEDURE — 6370000000 HC RX 637 (ALT 250 FOR IP): Performed by: NURSE PRACTITIONER

## 2020-09-18 PROCEDURE — 94060 EVALUATION OF WHEEZING: CPT

## 2020-09-18 PROCEDURE — 94729 DIFFUSING CAPACITY: CPT

## 2020-09-18 PROCEDURE — 94726 PLETHYSMOGRAPHY LUNG VOLUMES: CPT

## 2020-09-18 RX ORDER — ALBUTEROL SULFATE 90 UG/1
2 AEROSOL, METERED RESPIRATORY (INHALATION) ONCE
Status: COMPLETED | OUTPATIENT
Start: 2020-09-18 | End: 2020-09-18

## 2020-09-18 RX ADMIN — ALBUTEROL SULFATE 2 PUFF: 90 AEROSOL, METERED RESPIRATORY (INHALATION) at 09:37

## 2020-09-18 ASSESSMENT — PULMONARY FUNCTION TESTS
FEV1/FVC_PRE: 66
FEV1_PERCENT_PREDICTED_PRE: 59
FEV1/FVC_POST: 64
FEV1_PERCENT_PREDICTED_POST: 64

## 2020-09-21 ENCOUNTER — OFFICE VISIT (OUTPATIENT)
Dept: FAMILY MEDICINE CLINIC | Age: 60
End: 2020-09-21
Payer: MEDICARE

## 2020-09-21 VITALS
BODY MASS INDEX: 27.87 KG/M2 | DIASTOLIC BLOOD PRESSURE: 70 MMHG | WEIGHT: 194.7 LBS | SYSTOLIC BLOOD PRESSURE: 115 MMHG | HEART RATE: 68 BPM | OXYGEN SATURATION: 95 % | TEMPERATURE: 98 F | HEIGHT: 70 IN

## 2020-09-21 PROCEDURE — 99213 OFFICE O/P EST LOW 20 MIN: CPT | Performed by: NURSE PRACTITIONER

## 2020-09-21 RX ORDER — BUDESONIDE AND FORMOTEROL FUMARATE DIHYDRATE 160; 4.5 UG/1; UG/1
1 AEROSOL RESPIRATORY (INHALATION) 2 TIMES DAILY
Qty: 1 INHALER | Refills: 5 | Status: SHIPPED | OUTPATIENT
Start: 2020-09-21 | End: 2020-10-02 | Stop reason: SDUPTHER

## 2020-09-21 ASSESSMENT — ENCOUNTER SYMPTOMS
SORE THROAT: 0
SHORTNESS OF BREATH: 1

## 2020-09-21 NOTE — PROGRESS NOTES
[facility]  [facility]  [address]  [facility] [address]  Dept: [phone]    9/21/2020    CHIEF COMPLAINT    Chief Complaint   Patient presents with    Fatigue    Shortness of Breath       HPI    Dee Martinez is a 63 y.o. female who presents   Chief Complaint   Patient presents with    Fatigue    Shortness of Breath   . To discuss fatigue and shortness of breath. Shortness of breath/fatigue/history of COPD-SOB improved with Symbicort 160 1 puff twice daily. Patient noting increased energy and is only sleeping 10 hours/day when previously sleeping 18-20. Pulmonary function testing ordered per pulmonary's request.  Shows moderate severity of COPD. Patient referred to pulmonary at appointment 1 month ago. Pulmonary requested PFTs be done prior to scheduling her for an appointment. These were completed on Friday, 18 September as noted above    Heart palpitations-No recurrence since her last apt. Hyperparathyroidism-patient has been referred to endocrinology. Repeat labs and referral were re-faxed at her appointment 1 month ago. She has called their office twice and been told that they would call her, but they have not called. SOB slightly improved. Feels the spacer provided during her PFT testing is helpful. She hasn't felt the need to use her albuterol. Dysphagia-unchanged. Will be seeing GI on 9/30/2020. Fatigue   This is a chronic problem. The current episode started more than 1 month ago. The problem occurs daily. The problem has been gradually improving. Associated symptoms include fatigue. Pertinent negatives include no anorexia, chest pain, coughing, fever, myalgias or sore throat. Nothing aggravates the symptoms. She has tried rest and sleep for the symptoms. The treatment provided mild relief. Shortness of Breath   This is a chronic problem. The current episode started more than 1 month ago.  The problem occurs daily. The problem has been gradually improving. Pertinent negatives include no chest pain, fever or sore throat. She has tried beta agonist inhalers, rest and steroid inhalers for the symptoms. Vitals:    09/21/20 1406   BP: 115/70   Site: Right Upper Arm   Position: Sitting   Cuff Size: Medium Adult   Pulse: 68   Temp: 98 °F (36.7 °C)   TempSrc: Temporal   SpO2: 95%   Weight: 194 lb 11.2 oz (88.3 kg)   Height: 5' 10\" (1.778 m)       Wt Readings from Last 3 Encounters:   09/21/20 194 lb 11.2 oz (88.3 kg)   08/19/20 187 lb (84.8 kg)   02/06/20 184 lb (83.5 kg)     BP Readings from Last 3 Encounters:   09/21/20 115/70   08/19/20 105/70   02/06/20 111/75       REVIEW OF SYSTEMS    Review of Systems   Constitutional: Positive for fatigue. Negative for fever. HENT: Negative for sore throat. Respiratory: Positive for shortness of breath. Negative for cough. Cardiovascular: Negative. Negative for chest pain and palpitations (See HPI ). Gastrointestinal: Negative. Negative for anorexia. Musculoskeletal: Negative. Negative for myalgias. Skin: Negative. PAST MEDICAL HISTORY    Past Medical History:   Diagnosis Date    Adenoma of right adrenal gland     Stable 4/2019. No repeat imaging advised.      COPD [facility]chronic obstructive pulmonary disease) ([facility] [address]     Depression     Pneumonia 2017 & 2019    twice     Pre-diabetes        FAMILY HISTORY    Family History   Problem Relation Age of Onset    Heart Disease Mother     Diabetes Mother     Heart Attack Mother 36        COD, she had 3 heart attacks     Heart Disease Father     Diabetes Father     Heart Attack Father 50        COD     Diabetes Sister     Hypertension Sister     Atrial Fibrillation Sister     Diabetes type 2  Maternal Grandmother         COD    Heart Disease Maternal Grandfather         COD    Diabetes type 2  Paternal Grandmother     Heart Disease Paternal Grandmother     Diabetes type 2  Paternal Grandfather     Heart Disease Paternal Grandfather     Stroke Brother         COD     Alcohol Abuse Brother     Hypertension Brother     ADHD Brother     Heart Attack Brother 36        several     Heart Attack Paternal Uncle     Pancreatic Cancer Paternal Uncle     Pancreatic Cancer Paternal Uncle        SOCIAL HISTORY    Social History     Socioeconomic History    Marital status:       Spouse name: None    Number of children: None    Years of education: None    Highest education level: None   Occupational History    None   Social Needs    Financial resource strain: None    Food insecurity     Worry: None     Inability: None    Transportation needs     Medical: None     Non-medical: None   Tobacco Use    Smoking status: Current Every Day Smoker     Packs/day: 0.50     Years: 35.00     Pack years: 17.50     Types: Cigarettes    Smokeless tobacco: Never Used   Substance and Sexual Activity    Alcohol use: No    Drug use: No    Sexual activity: None   Lifestyle    Physical activity     Days per week: None     Minutes per session: None    Stress: None   Relationships    Social connections     Talks on phone: None     Gets together: None     Attends Jew service: None     Active member of club or organization: None     Attends meetings of clubs or organizations: None     Relationship status: None    Intimate partner violence     Fear of current or ex partner: None     Emotionally abused: None     Physically abused: None     Forced sexual activity: None   Other Topics Concern    None   Social History Narrative    None       SURGICAL HISTORY    Past Surgical History:   Procedure Laterality Date    CATARACT REMOVAL Bilateral     LUMBAR LAMINECTOMY  1991    TOOTH EXTRACTION  2019       CURRENT MEDICATIONS    Current Outpatient Medications   Medication Sig Dispense Refill    budesonide-formoterol (SYMBICORT) 160-4.5 MCG/ACT AERO Inhale 1 puff into the lungs 2 times daily 1 Inhaler 5    albuterol sulfate HFA (VENTOLIN HFA) 108 (90 Base) MCG/ACT inhaler Inhale 2 puffs into the lungs every 6 hours as needed for Wheezing 1 Inhaler 3    Handicap Placard MISC by Does not apply route Expires in 5 years ( 2025 ) 1 each 0    atorvastatin (LIPITOR) 10 MG tablet Take 1 tablet by mouth nightly 30 tablet 2    FLUoxetine (PROZAC) 40 MG capsule Take 2 capsules by mouth daily       albuterol (PROVENTIL) (2.5 MG/3ML) 0.083% nebulizer solution Take 3 mLs by nebulization every 2 hours as needed for Wheezing 120 each 3    famotidine (PEPCID) 20 MG tablet Take 1 tablet by mouth 2 times daily 60 tablet 3    aspirin 81 MG tablet Take 81 mg by mouth daily       No current facility-administered medications for this visit. ALLERGIES    Allergies   Allergen Reactions    Cephalosporins Anaphylaxis    Persantine [Dipyridamole] Hives    Ceftin [Cefuroxime Axetil]      Pt unable to recall type of reaction she had from ceftin    Penicillins        PHYSICAL EXAM   Physical Exam  Vitals signs and nursing note reviewed. Constitutional:       General: She is not in acute distress. Appearance: She is well-developed. She is not diaphoretic. HENT:      Head: Normocephalic. Right Ear: Hearing normal.      Left Ear: Hearing normal.   Eyes:      General:         Right eye: No discharge. Left eye: No discharge. Pupils: Pupils are equal.   Neck:      Musculoskeletal: Normal range of motion. Cardiovascular:      Rate and Rhythm: Normal rate and regular rhythm. Pulses: Normal pulses. Radial pulses are 2+ on the right side and 2+ on the left side. Heart sounds: Normal heart sounds, S1 normal and S2 normal. No murmur. Pulmonary:      Effort: Pulmonary effort is normal. No respiratory distress. Breath sounds: Normal breath sounds. No wheezing. Comments: Talking in full sentences with no shortness of breath noted. Skin:     General: Skin is warm and dry.    Neurological:      Mental Status: She is alert and oriented to person, place, and time. Psychiatric:         Behavior: Behavior normal. Behavior is cooperative. ASSESSMENT/PLAN  1. Other fatigue    -Denies fatigue may be related to hyperparathyroidism. Encouraged to see endocrine ASAP. -Our office will fax records to endocrinology again for the third time. We will also fax information to [facility] endocrinology    2. SOB (shortness of breath)  3. Chronic obstructive pulmonary disease with acute lower respiratory infection ([facility] [address]    -Chest x-ray and PFT completed. Patient to schedule with pulmonary ASAP  - budesonide-formoterol (SYMBICORT) 160-4.5 MCG/ACT AERO; Inhale 1 puff into the lungs 2 times daily  Dispense: 1 Inhaler; Refill: 5    4. Hyperparathyroidism ([facility] [address]  5. Hypercalcemia    - External Referral To Endocrinology  -We will continue working on endocrinology referral.  Will fax information to [facility] as well as endocrine and diabetes care center.   -To call our office she has further trouble scheduling    Doreen received counseling on the following healthy behaviors: nutrition, exercise and medication adherence  Reviewed prior labs and health maintenance  Continue current medications, diet and exercise. Discussed use, benefit, and side effects of prescribed medications. Barriers to medication compliance addressed. Patient given educational materials - see patient instructions  Was a self-tracking handout given in paper form or via [employer]? Yes    Requested Prescriptions     Signed Prescriptions Disp Refills    budesonide-formoterol (SYMBICORT) 160-4.5 MCG/ACT AERO 1 Inhaler 5     Sig: Inhale 1 puff into the lungs 2 times daily       All patient questions answered. Patient voiced understanding. Quality Measures    Body mass index is 27.94 kg/m². Normal. Weight control planned discussed Healthy diet and regular exercise. BP: 115/70 Blood pressure is normal. Treatment plan consists of No treatment change needed.     Lab Results   Component Value Date    LDLCALC 132 (H) 05/18/2019    LDLCHOLESTEROL 155 (H) 08/04/2016    (goal LDL reduction with dx if diabetes is 50% LDL reduction)      No flowsheet data found. Interpretation of Total Score Depression Severity: 1-4 = Minimal depression, 5-9 = Mild depression, 10-14 = Moderate depression, 15-19 = Moderately severe depression, 20-27 = Severe depression     Return in about 3 months (around 12/21/2020) for SOB, Dysphagia and Hyperparathyroidism .     (Please note that portions of this note were completed with a voice recognition program. Efforts were made to edit the dictations but occasionally words are mis-transcribed.)      Electronically signed by ILA Quinn CNP on 9/21/20 at 2:18 PM [place]

## 2020-09-21 NOTE — PROGRESS NOTES
Patient presents for SOB and fatigue  Pt states that she would like results of the PFT and labs     pharmacy verified

## 2020-09-22 ASSESSMENT — ENCOUNTER SYMPTOMS
GASTROINTESTINAL NEGATIVE: 1
COUGH: 0

## 2020-09-30 ENCOUNTER — OFFICE VISIT (OUTPATIENT)
Dept: GASTROENTEROLOGY | Age: 60
End: 2020-09-30
Payer: MEDICARE

## 2020-09-30 VITALS — SYSTOLIC BLOOD PRESSURE: 132 MMHG | WEIGHT: 193 LBS | BODY MASS INDEX: 27.69 KG/M2 | DIASTOLIC BLOOD PRESSURE: 74 MMHG

## 2020-09-30 PROCEDURE — 99213 OFFICE O/P EST LOW 20 MIN: CPT | Performed by: INTERNAL MEDICINE

## 2020-09-30 RX ORDER — OMEPRAZOLE 40 MG/1
40 CAPSULE, DELAYED RELEASE ORAL DAILY
Qty: 30 CAPSULE | Refills: 3 | Status: SHIPPED
Start: 2020-09-30 | End: 2020-12-21

## 2020-09-30 ASSESSMENT — ENCOUNTER SYMPTOMS
ALLERGIC/IMMUNOLOGIC NEGATIVE: 1
EYES NEGATIVE: 1
TROUBLE SWALLOWING: 1
RESPIRATORY NEGATIVE: 1
GASTROINTESTINAL NEGATIVE: 1

## 2020-09-30 NOTE — PROGRESS NOTES
Reason for Referral: Dysphagia      Adelaide Rivassus, APRN - CNP  [address] [facility], [address] [facility]    Chief Complaint   Patient presents with    New Patient     referred for Dysphagia. Patient states having troubles swallowing for acouple of years but its recently getting worse. Patient states choking on solids and liquids. She states it happens a couple times a week. Denies vomiting. Patient denies GERD. HISTORY OF PRESENT ILLNESS: Tami Sanchez is a 63 y.o. female with a past history remarkable for COPD, active smoker, pre-DM, adenoma right adrenal gland, referred for evaluation of dysphagia. Patient reports that she has tolerance to liquids but occasionally has intermittent choking with dry foods and dry solids. The symptoms have been chronic but have been progressive over the last few months. She is an active smoker and is unclear whether or not this is related to an esophageal lesion. Denies any significant weight loss or food aversion related to this. Smoker: 1/2 ppd x x 40 yrs  Drinking history: none   Illicit drugs: none  Abdominal surgeries: None   Prior Colonoscopy: yes, 8 yrs ago, normal per patient. Prior EGD: none   FH of GI issues:     Dysphagia-- onset x 5-6 yrs ago,   No changes in weight  No changes in bowel movements. -- Diagnosis --   1.ASCENDING COLON POLYPECTOMIES:       -  HYPERPLASTIC POLYPS AND SMALL TUBULAR ADENOMA. 2. DESCENDING COLON POLYPECTOMIES:     -  TUBULAR ADENOMA AND   HYPERPLASTIC POLYP. Past Medical,Family, and Social History reviewed and does contribute to the patient presentingcondition. Patient's PMH/PSH,SH,PSYCH Hx, MEDs, ALLERGIES, and ROS were all reviewed and updated in the appropriate sections. PAST MEDICAL HISTORY:  Past Medical History:   Diagnosis Date    Adenoma of right adrenal gland     Stable 4/2019. No repeat imaging advised.      COPD (chronic obstructive pulmonary disease) ([facility] [address]     Depression     Pneumonia 2017 & 2019    twice     Pre-diabetes        Past Surgical History:   Procedure Laterality Date    CATARACT REMOVAL Bilateral     LUMBAR LAMINECTOMY  1991    TOOTH EXTRACTION  2019       CURRENT MEDICATIONS:    Current Outpatient Medications:     budesonide-formoterol (SYMBICORT) 160-4.5 MCG/ACT AERO, Inhale 1 puff into the lungs 2 times daily, Disp: 1 Inhaler, Rfl: 5    albuterol sulfate HFA (VENTOLIN HFA) 108 (90 Base) MCG/ACT inhaler, Inhale 2 puffs into the lungs every 6 hours as needed for Wheezing, Disp: 1 Inhaler, Rfl: 3    Handicap Placard MISC, by Does not apply route Expires in 5 years ( 2025 ), Disp: 1 each, Rfl: 0    atorvastatin (LIPITOR) 10 MG tablet, Take 1 tablet by mouth nightly, Disp: 30 tablet, Rfl: 2    FLUoxetine (PROZAC) 40 MG capsule, Take 2 capsules by mouth daily , Disp: , Rfl:     albuterol (PROVENTIL) (2.5 MG/3ML) 0.083% nebulizer solution, Take 3 mLs by nebulization every 2 hours as needed for Wheezing, Disp: 120 each, Rfl: 3    aspirin 81 MG tablet, Take 81 mg by mouth daily, Disp: , Rfl:     ALLERGIES:   Allergies   Allergen Reactions    Cephalosporins Anaphylaxis    Persantine [Dipyridamole] Hives    Ceftin [Cefuroxime Axetil]      Pt unable to recall type of reaction she had from ceftin    Penicillins        FAMILY HISTORY:       Problem Relation Age of Onset    Heart Disease Mother     Diabetes Mother     Heart Attack Mother 36        COD, she had 3 heart attacks     Heart Disease Father     Diabetes Father     Heart Attack Father 50        COD     Diabetes Sister     Hypertension Sister     Atrial Fibrillation Sister     Diabetes type 2  Maternal Grandmother         COD    Heart Disease Maternal Grandfather         COD    Diabetes type 2  Paternal Grandmother     Heart Disease Paternal Grandmother     Diabetes type 2  Paternal Grandfather     Heart Disease Paternal Grandfather     Stroke Brother         COD     Alcohol Abuse Brother     Hypertension Brother     ADHD Brother     Heart Attack Brother 36        several     Heart Attack Paternal Uncle     Pancreatic Cancer Paternal Uncle     Pancreatic Cancer Paternal Uncle          SOCIAL HISTORY:   Social History     Socioeconomic History    Marital status:      Spouse name: Not on file    Number of children: Not on file    Years of education: Not on file    Highest education level: Not on file   Occupational History    Not on file   Social Needs    Financial resource strain: Not on file    Food insecurity     Worry: Not on file     Inability: Not on file    Transportation needs     Medical: Not on file     Non-medical: Not on file   Tobacco Use    Smoking status: Current Every Day Smoker     Packs/day: 0.50     Years: 35.00     Pack years: 17.50     Types: Cigarettes     Start date: 9/30/1980    Smokeless tobacco: Never Used   Substance and Sexual Activity    Alcohol use: No    Drug use: No    Sexual activity: Not on file   Lifestyle    Physical activity     Days per week: Not on file     Minutes per session: Not on file    Stress: Not on file   Relationships    Social connections     Talks on phone: Not on file     Gets together: Not on file     Attends Congregational service: Not on file     Active member of club or organization: Not on file     Attends meetings of clubs or organizations: Not on file     Relationship status: Not on file    Intimate partner violence     Fear of current or ex partner: Not on file     Emotionally abused: Not on file     Physically abused: Not on file     Forced sexual activity: Not on file   Other Topics Concern    Not on file   Social History Narrative    Not on file         REVIEW OF SYSTEMS: A 12-point review of systems was obtained and pertinent positives and negatives were listed below. REVIEW OF SYSTEMS:     Constitutional: No fever, no chills, no lethargy, no weakness.   HEENT:  No headache, otalgia, itchy eyes, nasal discharge or sore throat. Cardiac:  No chest pain, dyspnea, orthopnea or PND. Chest:   No cough, phlegm or wheezing. Abdomen:      Detailed by MA   Neuro:  No focal weakness, abnormal movements or seizure like activity. Skin:   No rashes, no itching. :   No hematuria, no pyuria, no dysuria, no flank pain. Extremities:  No swelling or joint pains. ROS was otherwise negative    Review of Systems   Constitutional: Negative. HENT: Positive for trouble swallowing. Eyes: Negative. Respiratory: Negative. Cardiovascular: Negative. Gastrointestinal: Negative. Endocrine: Negative. Genitourinary: Negative. Musculoskeletal: Negative. Skin: Negative. Allergic/Immunologic: Negative. Neurological: Negative. Hematological: Negative. Psychiatric/Behavioral: Negative. All other systems reviewed and are negative. PHYSICAL EXAMINATION: Vital signs reviewed per the nursing documentation. /74   Wt 193 lb (87.5 kg)   BMI 27.69 kg/m²   Body mass index is 27.69 kg/m². Physical Exam    Physical Exam   Constitutional: Patient is oriented to person, place, and time. Patient appears well-developed and well-nourished. HENT:   Head: Normocephalic and atraumatic. Eyes: Pupils are equal, round, and reactive to light. EOM are normal.   Neck: Normal range of motion. Neck supple. No JVD present. No tracheal deviation present. No thyromegaly present. Cardiovascular: Normal rate, regular rhythm, normal heart sounds and intact distal pulses. Pulmonary/Chest: Effort normal and breath sounds normal. No stridor. No respiratory distress. He has no wheezes. He has no rales. He exhibits no tenderness. Abdominal: Soft. Bowel sounds are normal. He exhibits no distension and no mass. There is no tenderness. There is no rebound and no guarding. No hernia. Musculoskeletal: Normal range of motion. Lymphadenopathy:    Patient has no cervical adenopathy.    Neurological: Patient is care.  Is unclear whether or not the patient needs CT or MRI imaging    2) esophageal dysphagia- cannot rule out stricture, esophageal lesion or dysmotility at this time. Will try to exclude and evaluate structural causes by obtaining esophagram.  Mixed connective tissue disease to be considered as part of the differential and we will send serological tests to further evaluate this differential.  Once patient is optimized from the endocrinological standpoint we will plan for diagnostic upper endoscopy    3) silent reflux- will place patient on Prilosec 40mg which is to be taken daily 30 minutes before breakfast      Thank you for allowing me to participate in the care of Ms. Boogie. For any further questions please do not hesitate to contact me. I have reviewed and agree with the MA/LPN ROS please refer to their documentation from today's encounter on a separate note. Pearl Trevizo MD, MPH   [facility]  Office #: [phone]          this note is created with the assistance of a speech recognition program.  While intending to generate a document that actually reflects the content of the visit, the document can still have some errors including those of syntax and sound a like substitutions which may escape proof reading. It such instances, actual meaning can be extrapolated by contextual diversion.

## 2020-10-02 RX ORDER — BUDESONIDE AND FORMOTEROL FUMARATE DIHYDRATE 160; 4.5 UG/1; UG/1
2 AEROSOL RESPIRATORY (INHALATION) 2 TIMES DAILY
Qty: 1 INHALER | Refills: 0 | COMMUNITY
Start: 2020-10-02 | End: 2021-06-08 | Stop reason: SDUPTHER

## 2020-10-08 ENCOUNTER — HOSPITAL ENCOUNTER (OUTPATIENT)
Dept: PREADMISSION TESTING | Age: 60
Setting detail: SPECIMEN
Discharge: HOME | End: 2020-10-12
Payer: MEDICARE

## 2020-10-08 PROCEDURE — U0003 INFECTIOUS AGENT DETECTION BY NUCLEIC ACID (DNA OR RNA); SEVERE ACUTE RESPIRATORY SYNDROME CORONAVIRUS 2 (SARS-COV-2) (CORONAVIRUS DISEASE [COVID-19]), AMPLIFIED PROBE TECHNIQUE, MAKING USE OF HIGH THROUGHPUT TECHNOLOGIES AS DESCRIBED BY CMS-2020-01-R: HCPCS

## 2020-10-10 LAB — SARS-COV-2, NAA: NOT DETECTED

## 2020-10-12 ENCOUNTER — TELEPHONE (OUTPATIENT)
Dept: GASTROENTEROLOGY | Age: 60
End: 2020-10-12

## 2020-10-12 ENCOUNTER — HOSPITAL ENCOUNTER (OUTPATIENT)
Dept: GENERAL RADIOLOGY | Age: 60
Discharge: HOME | End: 2020-10-14
Payer: MEDICARE

## 2020-10-12 ENCOUNTER — HOSPITAL ENCOUNTER (OUTPATIENT)
Age: 60
Discharge: HOME | End: 2020-10-12
Payer: MEDICARE

## 2020-10-12 LAB — RHEUMATOID FACTOR: <10 IU/ML

## 2020-10-12 PROCEDURE — 92611 MOTION FLUOROSCOPY/SWALLOW: CPT

## 2020-10-12 PROCEDURE — 86431 RHEUMATOID FACTOR QUANT: CPT

## 2020-10-12 PROCEDURE — 86038 ANTINUCLEAR ANTIBODIES: CPT

## 2020-10-12 PROCEDURE — 86235 NUCLEAR ANTIGEN ANTIBODY: CPT

## 2020-10-12 PROCEDURE — 86225 DNA ANTIBODY NATIVE: CPT

## 2020-10-12 PROCEDURE — 74220 X-RAY XM ESOPHAGUS 1CNTRST: CPT

## 2020-10-12 PROCEDURE — 36415 COLL VENOUS BLD VENIPUNCTURE: CPT

## 2020-10-12 PROCEDURE — 74230 X-RAY XM SWLNG FUNCJ C+: CPT

## 2020-10-12 NOTE — PROCEDURES
INSTRUMENTAL SWALLOW REPORT  MODIFIED BARIUM SWALLOW    NAME: Erasto De La Garza   : 1960  MRN: [id number]       Date of Eval: 10/12/2020              Referring Diagnosis(es):      Past Medical History:  has a past medical history of Adenoma of right adrenal gland, COPD (chronic obstructive pulmonary disease) ([facility] [address] Depression, Pneumonia, and Pre-diabetes. Past Surgical History:  has a past surgical history that includes lumbar laminectomy (); Cataract removal (Bilateral); and Tooth Extraction (). Recent CXR/CT of Chest: Date 2020  Impression    Remote granulomatous disease         No acute cardiopulmonary findings          Patient Complaints/Reason for Referral:  Erasto De La Garza was referred for a MBS to assess the efficiency of his/her swallow function, assess for aspiration, and to make recommendations regarding safe dietary consistencies, effective compensatory strategies, and safe eating environment. Onset of problem:      Pt. Reports she had 2 episodes of aspiration over the past 6 months and coughed up food days later. Behavior/Cognition/Vision/Hearing:  Vision: Within Functional Limits  Hearing: Within functional limits    Impressions:  Patient presents with  safe swallow for Regular diet with thin liquids as evidenced by no observed aspiration noted with consistencies tested. Recommend small sips and bites, only feed when alert and awake and upright at 90 degrees for all PO intake. Recommend close monitoring for overt/clinical s/s of aspiration and D/C PO intake and complete Modified Barium Swallow Study should they occur. Results and recommendations reported to RN.     Treatment Dx and ICD 10: R13.1    Compensatory Swallowing Strategies Attempted: Eat/Feed slowly;Upright as possible for all oral intake;Small bites/sips  Postural Changes and/or Swallow Maneuvers Trialed: Upright 90 degrees      Recommended Diet:  Solid consistency: Regular  Liquid consistency: Thin  Liquid administration via: Cup;Straw    Medication administration: PO    Safe Swallow Protocol:  Supervision: Independent  Compensatory Swallowing Strategies: Alternate solids and liquids;Eat/Feed slowly;Upright as possible for all oral intake;Small bites/sips      Recommendations/Treatment  Requires SLP Intervention: No        D/C Recommendations: Home independently  Postural Changes and/or Swallow Maneuvers: Upright 90 degrees      Recommended Exercises:    Therapeutic Interventions: Diet tolerance monitoring         Education: Images and recommendations were reviewed with pt following this exam.   Patient Education: yes  Patient Education Response: Verbalizes understanding    Prognosis  Prognosis for safe diet advancement: good  Duration/Frequency of Treatment  Duration/Frequency of Treatment: no need      Goals:    Long Term:     To Maximize safety with intake, optimize nutrition/hydration and minimize risk for aspiration. Short Term:  Goals:  The patient will tolerate recommended diet without observed clinical signs of aspiration      Oral Preparation / Oral Phase  Oral Phase: WFL  Oral Phase: A-P transit and oral manipulation functional for consistencies tested      Pharyngeal Phase  Pharyngeal Phase: WFL    Pharyngeal: Thin straw, Puree, Fruit and Cracker: No penetration and no aspiration with no stasis - WNL    Dysphagia Outcome Severity Scale: Level 7: Normal in all situations  Penetration-Aspiration Scale (PAS): 1 - Material does not enter the airway      Esophageal Phase  Esophageal Screen: WFL        Pain   Patient Currently in Pain: No         Therapy Time:   Individual Concurrent Group Co-treatment   Time In 1345         Time Out 1400         Minutes 15                   Anju Sinha, 10/12/2020, 2:12 PM

## 2020-10-12 NOTE — TELEPHONE ENCOUNTER
Per 's approval, writer is ordering [place] modified esophagram with video to be done prior to [facility] Esophagram.

## 2020-10-12 NOTE — TELEPHONE ENCOUNTER
[facility] from [facility] radiology called in regards to the esophagram that was ordered for this patient. She believes that the patient needs and esophagram with video and if the patient passes that, she could do the original order after. Writer told [facility] that I would discuss this with Ariella Thompson and put the new order in once it was approved. Writer will contact [facility] back with and inform her of what Ariella Thompson wants to do as well.

## 2020-10-13 LAB
ANTI DNA DOUBLE STRANDED: 4 IU/ML
ANTI-CENTROMERE: 11 U/ML
ANTI-NUCLEAR ANTIBODY (ANA): NEGATIVE

## 2020-11-03 ENCOUNTER — OFFICE VISIT (OUTPATIENT)
Dept: GASTROENTEROLOGY | Age: 60
End: 2020-11-03
Payer: MEDICARE

## 2020-11-03 VITALS
DIASTOLIC BLOOD PRESSURE: 81 MMHG | WEIGHT: 194 LBS | BODY MASS INDEX: 27.84 KG/M2 | SYSTOLIC BLOOD PRESSURE: 139 MMHG | TEMPERATURE: 97.2 F

## 2020-11-03 PROCEDURE — 99213 OFFICE O/P EST LOW 20 MIN: CPT | Performed by: INTERNAL MEDICINE

## 2020-11-03 ASSESSMENT — ENCOUNTER SYMPTOMS
TROUBLE SWALLOWING: 1
EYES NEGATIVE: 1
GASTROINTESTINAL NEGATIVE: 1
ALLERGIC/IMMUNOLOGIC NEGATIVE: 1
RESPIRATORY NEGATIVE: 1

## 2020-11-03 NOTE — PROGRESS NOTES
GI FOLLOW UP     INTERVAL HISTORY:     Continues to have dysphagia symptoms  Patient is seeing an endocrinologist for elevated PTH  Modified barium swallow and esophagram were unremarkable      Chief Complaint   Patient presents with    Follow-up     esophagram/lab follow up. Patient states she does not take the prilosec because it made her double over in pain. Still has trouble swallowing off and on. 2-3 times a week. No vomiting. HISTORY OF PRESENT ILLNESS: Chris Hope is a 63 y.o. female with a past history remarkable for COPD, active smoker, pre-DM, adenoma right adrenal gland, referred for evaluation of dysphagia. Patient reports that she has tolerance to liquids but occasionally has intermittent choking with dry foods and dry solids. The symptoms have been chronic but have been progressive over the last few months. She is an active smoker and is unclear whether or not this is related to an esophageal lesion. Denies any significant weight loss or food aversion related to this.        Smoker: 1/2 ppd x x 40 yrs  Drinking history: none   Illicit drugs: none  Abdominal surgeries: None   Prior Colonoscopy: yes, 8 yrs ago, normal per patient. Prior EGD: none   FH of GI issues:      Dysphagia-- onset x 5-6 yrs ago,   No changes in weight  No changes in bowel movements. Past Medical,Family, and Social History reviewed and does contribute to the patient presenting condition. Patient's PMH/PSH,SH,PSYCH Hx, MEDs, ALLERGIES, and ROS were all reviewed and updated in the appropriate sections. PAST MEDICAL HISTORY:  Past Medical History:   Diagnosis Date    Adenoma of right adrenal gland     Stable 4/2019. No repeat imaging advised.      COPD (chronic obstructive pulmonary disease) ([facility] [address]     Depression     Pneumonia 2017 & 2019    twice     Pre-diabetes        Past Surgical History:   Procedure Laterality Date    CATARACT REMOVAL Bilateral     LUMBAR LAMINECTOMY  1991    TOOTH EXTRACTION  2019       CURRENT MEDICATIONS:    Current Outpatient Medications:     budesonide-formoterol (SYMBICORT) 160-4.5 MCG/ACT AERO, Inhale 2 puffs into the lungs 2 times daily Lot # [id number], Exp Date 07/2021, NDC # 0473-2877-64, Disp: 1 Inhaler, Rfl: 0    Handicap Placard MISC, by Does not apply route Expires in 5 years ( 2025 ), Disp: 1 each, Rfl: 0    atorvastatin (LIPITOR) 10 MG tablet, Take 1 tablet by mouth nightly, Disp: 30 tablet, Rfl: 2    FLUoxetine (PROZAC) 40 MG capsule, Take 2 capsules by mouth daily , Disp: , Rfl:     albuterol (PROVENTIL) (2.5 MG/3ML) 0.083% nebulizer solution, Take 3 mLs by nebulization every 2 hours as needed for Wheezing, Disp: 120 each, Rfl: 3    aspirin 81 MG tablet, Take 81 mg by mouth daily, Disp: , Rfl:     omeprazole (PRILOSEC) 40 MG delayed release capsule, Take 1 capsule by mouth daily (Patient not taking: Reported on 11/3/2020), Disp: 30 capsule, Rfl: 3    ALLERGIES:   Allergies   Allergen Reactions    Cephalosporins Anaphylaxis    Persantine [Dipyridamole] Hives    Ceftin [Cefuroxime Axetil]      Pt unable to recall type of reaction she had from ceftin    Penicillins        FAMILY HISTORY:       Problem Relation Age of Onset    Heart Disease Mother     Diabetes Mother     Heart Attack Mother 36        COD, she had 3 heart attacks     Heart Disease Father     Diabetes Father     Heart Attack Father 50        COD     Diabetes Sister     Hypertension Sister     Atrial Fibrillation Sister     Diabetes type 2  Maternal Grandmother         COD    Heart Disease Maternal Grandfather         COD    Diabetes type 2  Paternal Grandmother     Heart Disease Paternal Grandmother     Diabetes type 2  Paternal Grandfather     Heart Disease Paternal Grandfather     Stroke Brother         COD     Alcohol Abuse Brother     Hypertension Brother  ADHD Brother     Heart Attack Brother 36        several     Heart Attack Paternal Uncle     Pancreatic Cancer Paternal Uncle     Pancreatic Cancer Paternal Uncle          SOCIAL HISTORY:   Social History     Socioeconomic History    Marital status:      Spouse name: Not on file    Number of children: Not on file    Years of education: Not on file    Highest education level: Not on file   Occupational History    Not on file   Social Needs    Financial resource strain: Not on file    Food insecurity     Worry: Not on file     Inability: Not on file    Transportation needs     Medical: Not on file     Non-medical: Not on file   Tobacco Use    Smoking status: Current Every Day Smoker     Packs/day: 0.50     Years: 35.00     Pack years: 17.50     Types: Cigarettes     Start date: 9/30/1980    Smokeless tobacco: Never Used   Substance and Sexual Activity    Alcohol use: No    Drug use: No    Sexual activity: Not on file   Lifestyle    Physical activity     Days per week: Not on file     Minutes per session: Not on file    Stress: Not on file   Relationships    Social connections     Talks on phone: Not on file     Gets together: Not on file     Attends Sabianist service: Not on file     Active member of club or organization: Not on file     Attends meetings of clubs or organizations: Not on file     Relationship status: Not on file    Intimate partner violence     Fear of current or ex partner: Not on file     Emotionally abused: Not on file     Physically abused: Not on file     Forced sexual activity: Not on file   Other Topics Concern    Not on file   Social History Narrative    Not on file       REVIEW OF SYSTEMS: A 12-point review of systems was obtained and pertinent positives and negatives were listed below. REVIEW OF SYSTEMS:     Constitutional: No fever, no chills, no lethargy, no weakness. HEENT:  No headache, otalgia, itchy eyes, nasal discharge or sore throat.   Cardiac: No chest pain, dyspnea, orthopnea or PND. Chest:   No cough, phlegm or wheezing. Abdomen:      Detailed by MA   Neuro:  No focal weakness, abnormal movements or seizure like activity. Skin:   No rashes, no itching. :   No hematuria, no pyuria, no dysuria, no flank pain. Extremities:  No swelling or joint pains. ROS was otherwise negative    Review of Systems   Constitutional: Negative. HENT: Positive for trouble swallowing. Eyes: Negative. Respiratory: Negative. Cardiovascular: Negative. Gastrointestinal: Negative. Endocrine: Negative. Genitourinary: Negative. Musculoskeletal: Negative. Skin: Negative. Allergic/Immunologic: Negative. Neurological: Negative. Hematological: Negative. Psychiatric/Behavioral: Negative. All other systems reviewed and are negative. PHYSICAL EXAMINATION: Vital signs reviewed per the nursing documentation. Temp 97.2 °F (36.2 °C)   Wt 194 lb (88 kg)   BMI 27.84 kg/m²   Body mass index is 27.84 kg/m². Physical Exam    Physical Exam   Constitutional: Patient is oriented to person, place, and time. Patient appears well-developed and well-nourished. HENT:   Head: Normocephalic and atraumatic. Eyes: Pupils are equal, round, and reactive to light. EOM are normal.   Neck: Normal range of motion. Neck supple. No JVD present. No tracheal deviation present. No thyromegaly present. Cardiovascular: Normal rate, regular rhythm, normal heart sounds and intact distal pulses. Pulmonary/Chest: Effort normal and breath sounds normal. No stridor. No respiratory distress. He has no wheezes. He has no rales. He exhibits no tenderness. Abdominal: Soft. Bowel sounds are normal. He exhibits no distension and no mass. There is no tenderness. There is no rebound and no guarding. No hernia. Musculoskeletal: Normal range of motion. Lymphadenopathy:    Patient has no cervical adenopathy.    Neurological: Patient is alert and oriented to person, place, and time. Psychiatric: Patient has a normal mood and affect. Patient behavior is normal.       LABORATORY DATA: Reviewed  Lab Results   Component Value Date    WBC 4.0 08/27/2020    HGB 14.0 08/27/2020    HCT 43.2 08/27/2020    MCV 98.4 08/27/2020     08/27/2020     08/27/2020    K 4.9 08/27/2020     08/27/2020    CO2 24 08/27/2020    BUN 12 08/27/2020    CREATININE 0.61 08/27/2020    LABALBU 4.4 08/27/2020    BILITOT 0.30 08/27/2020    ALKPHOS 96 08/27/2020    AST 20 08/27/2020    ALT 22 08/27/2020    INR 0.9 11/23/2017         Lab Results   Component Value Date    RBC 4.39 08/27/2020    HGB 14.0 08/27/2020    MCV 98.4 08/27/2020    MCH 31.9 08/27/2020    MCHC 32.4 08/27/2020    RDW 11.5 (L) 08/27/2020    MPV 11.1 08/27/2020    BASOPCT 1 08/27/2020    LYMPHSABS 1.18 08/27/2020    MONOSABS 0.35 08/27/2020    NEUTROABS 2.35 08/27/2020    EOSABS 0.06 08/27/2020    BASOSABS 0.05 08/27/2020         DIAGNOSTIC TESTING:     Fl Esophagram    Result Date: 10/12/2020  EXAMINATION: DOUBLE CONTRAST ESOPHAGRAM 10/12/2020 2:17 pm TECHNIQUE: Double contrast esophagram was performed with barium and air contrast. FLUOROSCOPY DOSE AND TYPE OR TIME AND EXPOSURES: 2 minutes. 14 images were obtained. COMPARISON: None HISTORY: ORDERING SYSTEM PROVIDED HISTORY: Dysphagia, unspecified type TECHNOLOGIST PROVIDED HISTORY: dysphagia Reason for Exam: dysphgia hx of aspiration Acuity: Unknown Type of Exam: Unknown FINDINGS: The esophagus is of normal caliber and demonstrates normal motility. There are no mucosal abnormalities seen. The gastroesophageal junction is normal.  There is no evidence for reflux seen.      Unremarkable esophagram     Fl Modified Barium Swallow W Video    Result Date: 10/12/2020  EXAMINATION: MODIFIED BARIUM SWALLOW WAS PERFORMED IN CONJUNCTION WITH SPEECH PATHOLOGY SERVICES TECHNIQUE: Fluoroscopic evaluation of the swallowing mechanism was performed with multiple consistency of barium product. FLUOROSCOPY DOSE AND TYPE OR TIME AND EXPOSURES: 0.9 minutes. No image was saved to PACS. COMPARISON: None HISTORY: ORDERING SYSTEM PROVIDED HISTORY: Dysphagia, unspecified type TECHNOLOGIST PROVIDED HISTORY: dysphagia Reason for Exam: dysphgia hx of aspiration Acuity: Unknown Type of Exam: Unknown FINDINGS: Oral phase of swallowing was grossly within normal limits. No evidence of laryngeal penetration or aspiration. No evidence of laryngeal penetration or aspiration. Please see separate speech pathology report for full discussion of findings and recommendations. IMPRESSION: Giovana Galaviz is a 63 y.o. female with a past history remarkable for COPD, active smoker, pre-DM, adenoma right adrenal gland, rheumatoid thrice, referred for evaluation of dysphagia. Patient reports that she has tolerance to liquids but occasionally has intermittent choking with dry foods and dry solids. The symptoms have been chronic but have been progressive over the last few months. She is an active smoker and is unclear whether or not this is related to an esophageal lesion. Denies any significant weight loss or food aversion related to this.     Reviewing the patient's most recent labs she was identified to have elevated PTH and elevated calcium levels which suggest primary hyperparathyroidism which may pose a risk as far as endoscopy and anesthesia at this time      PLAN:    1) esophageal dysphagia-esophagram and modified barium swallow were negative for any obvious stricture or structural causes of the patient's dysphagia. Connective tissue serological studies were negative. Patient did not see any response with Prilosec. She continues with Tums as needed for dyspepsia. Awaiting clearance from PCP or endocrinologist given the elevated PTH prior to scheduling an upper endoscopy due to some anesthesia risk    2) Active smoking--advised cessation. 3) Plan for EGD when cleared by PCP or endocrine. Highly elevated PTH posing anesthesia risk. RTC in 4 weeks. Thank you for allowing me to participate in the care of Ms. Boogie. For any further questions please do not hesitate to contact me. I have reviewed and agree with the ROS entered by the MA/LPN from today's encounter documented in a separate note. Jamie Valentin MD, MPH   [facility]  Office #: [phone]    this note is created with the assistance of a speech recognition program.  While intending to generate a document that actually reflects the content of the visit, the document can still have some errors including those of syntax and sound a like substitutions which may escape proof reading. It such instances, actual meaning can be extrapolated by contextual diversion.

## 2020-11-16 RX ORDER — ATORVASTATIN CALCIUM 10 MG/1
TABLET, FILM COATED ORAL
Qty: 90 TABLET | Refills: 1 | Status: SHIPPED | OUTPATIENT
Start: 2020-11-16 | End: 2021-07-21

## 2020-12-21 ENCOUNTER — OFFICE VISIT (OUTPATIENT)
Dept: FAMILY MEDICINE CLINIC | Age: 60
End: 2020-12-21
Payer: MEDICARE

## 2020-12-21 VITALS
OXYGEN SATURATION: 96 % | HEIGHT: 70 IN | SYSTOLIC BLOOD PRESSURE: 115 MMHG | RESPIRATION RATE: 17 BRPM | TEMPERATURE: 97.9 F | WEIGHT: 197.2 LBS | BODY MASS INDEX: 28.23 KG/M2 | DIASTOLIC BLOOD PRESSURE: 73 MMHG | HEART RATE: 72 BPM

## 2020-12-21 PROCEDURE — 99214 OFFICE O/P EST MOD 30 MIN: CPT | Performed by: NURSE PRACTITIONER

## 2020-12-21 ASSESSMENT — ENCOUNTER SYMPTOMS
CONSTIPATION: 0
SWOLLEN GLANDS: 0
DIARRHEA: 0
GASTROINTESTINAL NEGATIVE: 1
SHORTNESS OF BREATH: 1
VOMITING: 0
SORE THROAT: 0
ABDOMINAL PAIN: 0
NAUSEA: 0
COUGH: 0
EYES NEGATIVE: 1

## 2020-12-21 NOTE — PROGRESS NOTES
[facility]  [facility]  [address]  [facility] [address]  Dept: [phone]    12/21/2020    CHIEF COMPLAINT    Chief Complaint   Patient presents with    Fatigue     follow up     HPI    Karis Garsia is a 62 y.o. female who presents   Chief Complaint   Patient presents with    Fatigue     follow up   . Appointment to f/u on fatigue and hyperparathyroidism    Patient notes fatigue has improved since the initial onset, but only partially. Noting that she is still napping quite often. She is sleeping more throughout the day. She woke up at 7 am & got up in her recliner where she fell asleep from 8-12 pm.   Patient noting sleeping between 10-12 hours/day when previously sleeping 18-20. SOB/COPD- continues noting SOB has improved with Symbicort 160 1 puff BID   PFT testing and CXR were performed were WNL. She never followed with pulmonary, but does not feel she needs to as her shortness of breath is stable  She continues cutting back on smoking    Dysphagia- She has had two additional episodes of choking with subsequent vomiting. She has seen GI and had the swallow study. Plans for Upper GI on hold until clearance received from endocrinology for anethesia due to elevated PTH. Hyperparathyroidism-patient has been referred to endocrinology on multiple occasions, but has had trouble due to [facility] endocrinologist retiring, [facility] not taking her insurance and [address] not calling her back. Fatigue  This is a chronic problem. Episode onset: 8/2020. The problem occurs daily. The problem has been gradually improving. Associated symptoms include fatigue ( See HPI). Pertinent negatives include no abdominal pain, anorexia, chest pain, chills, coughing, fever, headaches, myalgias, nausea, sore throat, swollen glands or vomiting. Nothing aggravates the symptoms. She has tried rest and sleep for the symptoms. The treatment provided mild relief.    Shortness of Breath  This is a chronic problem. The current episode started more than 1 month ago. The problem occurs daily. The problem has been gradually improving. Pertinent negatives include no abdominal pain, chest pain, fever, headaches, sore throat, swollen glands or vomiting. Exacerbated by: running the vaccum   The patient has no known risk factors for DVT/PE. She has tried beta agonist inhalers, rest and steroid inhalers for the symptoms. The treatment provided mild relief. Vitals:    12/21/20 1506   BP: 115/73   Site: Left Upper Arm   Position: Sitting   Cuff Size: Medium Adult   Pulse: 72   Resp: 17   Temp: 97.9 °F (36.6 °C)   TempSrc: Temporal   SpO2: 96%   Weight: 197 lb 3.2 oz (89.4 kg)   Height: 5' 10\" (1.778 m)       Wt Readings from Last 3 Encounters:   12/21/20 197 lb 3.2 oz (89.4 kg)   11/03/20 194 lb (88 kg)   09/30/20 193 lb (87.5 kg)     BP Readings from Last 3 Encounters:   12/21/20 115/73   11/03/20 139/81   09/30/20 132/74       REVIEW OF SYSTEMS    Review of Systems   Constitutional: Positive for fatigue ( See HPI). Negative for chills and fever. HENT: Negative for sore throat. Eyes: Negative. Negative for visual disturbance. Respiratory: Positive for shortness of breath. Negative for cough. Cardiovascular: Negative. Negative for chest pain and palpitations (No recurreance in several months ). Gastrointestinal: Negative. Negative for abdominal pain, anorexia, constipation, diarrhea, nausea and vomiting. Dysphagia. See HPI   Genitourinary: Negative. Negative for difficulty urinating. Musculoskeletal: Negative. Negative for myalgias. Neurological: Negative. Negative for dizziness and headaches. PAST MEDICAL HISTORY    Past Medical History:   Diagnosis Date    Adenoma of right adrenal gland     Stable 4/2019. No repeat imaging advised.      COPD (chronic obstructive pulmonary disease) ([facility] [address]     Depression     Pneumonia 2017 & 2019    twice     Pre-diabetes        FAMILY HISTORY    Family History   Problem Relation Age of Onset    Heart Disease Mother     Diabetes Mother     Heart Attack Mother 36        COD, she had 3 heart attacks     Heart Disease Father     Diabetes Father     Heart Attack Father 50        COD     Diabetes Sister     Hypertension Sister     Atrial Fibrillation Sister     Diabetes type 2  Maternal Grandmother         COD    Heart Disease Maternal Grandfather         COD    Diabetes type 2  Paternal Grandmother     Heart Disease Paternal Grandmother     Diabetes type 2  Paternal Grandfather     Heart Disease Paternal Grandfather     Stroke Brother         COD     Alcohol Abuse Brother     Hypertension Brother     ADHD Brother     Heart Attack Brother 36        several     Heart Attack Paternal Uncle     Pancreatic Cancer Paternal Uncle     Pancreatic Cancer Paternal Uncle        SOCIAL HISTORY    Social History     Socioeconomic History    Marital status:       Spouse name: None    Number of children: None    Years of education: None    Highest education level: None   Occupational History    None   Social Needs    Financial resource strain: None    Food insecurity     Worry: None     Inability: None    Transportation needs     Medical: None     Non-medical: None   Tobacco Use    Smoking status: Current Every Day Smoker     Packs/day: 0.50     Years: 35.00     Pack years: 17.50     Types: Cigarettes     Start date: 9/30/1980    Smokeless tobacco: Never Used   Substance and Sexual Activity    Alcohol use: No    Drug use: No    Sexual activity: None   Lifestyle    Physical activity     Days per week: None     Minutes per session: None    Stress: None   Relationships    Social connections     Talks on phone: None     Gets together: None     Attends Adventism service: None     Active member of club or organization: None     Attends meetings of clubs or organizations: None     Relationship status: None    Intimate partner violence     Fear of current or ex partner: None     Emotionally abused: None     Physically abused: None     Forced sexual activity: None   Other Topics Concern    None   Social History Narrative    None       SURGICAL HISTORY    Past Surgical History:   Procedure Laterality Date    CATARACT REMOVAL Bilateral     LUMBAR LAMINECTOMY  1991    TOOTH EXTRACTION  2019       CURRENT MEDICATIONS    Current Outpatient Medications   Medication Sig Dispense Refill    atorvastatin (LIPITOR) 10 MG tablet TAKE ONE TABLET BY MOUTH ONCE NIGHTLY 90 tablet 1    budesonide-formoterol (SYMBICORT) 160-4.5 MCG/ACT AERO Inhale 2 puffs into the lungs 2 times daily Lot # [id number], Exp Date 07/2021, NDC # 6942-7703-84 1 Inhaler 0    Handicap Placard MISC by Does not apply route Expires in 5 years ( 2025 ) 1 each 0    FLUoxetine (PROZAC) 40 MG capsule Take 2 capsules by mouth daily       albuterol (PROVENTIL) (2.5 MG/3ML) 0.083% nebulizer solution Take 3 mLs by nebulization every 2 hours as needed for Wheezing 120 each 3    aspirin 81 MG tablet Take 81 mg by mouth daily       No current facility-administered medications for this visit. ALLERGIES    Allergies   Allergen Reactions    Cephalosporins Anaphylaxis    Persantine [Dipyridamole] Hives    Ceftin [Cefuroxime Axetil]      Pt unable to recall type of reaction she had from ceftin    Penicillins        PHYSICAL EXAM   Physical Exam  Vitals signs and nursing note reviewed. Constitutional:       General: She is not in acute distress. Appearance: She is well-developed. She is not diaphoretic. Interventions: Face mask in place. HENT:      Head: Normocephalic. Right Ear: Hearing normal.      Left Ear: Hearing normal.   Eyes:      General:         Right eye: No discharge. Left eye: No discharge. Pupils: Pupils are equal.   Neck:      Musculoskeletal: Normal range of motion.    Cardiovascular:      Rate and Rhythm: Normal rate and regular rhythm. Pulses: Normal pulses. Radial pulses are 2+ on the right side and 2+ on the left side. Heart sounds: Normal heart sounds, S1 normal and S2 normal. No murmur. Pulmonary:      Effort: Pulmonary effort is normal. No respiratory distress. Breath sounds: Normal breath sounds. No wheezing. Comments: Talking in full sentences with no shortness of breath noted. Skin:     General: Skin is warm and dry. Neurological:      Mental Status: She is alert and oriented to person, place, and time. Psychiatric:         Behavior: Behavior normal. Behavior is cooperative. ASSESSMENT/PLAN  1. Other fatigue    -Suspect that fatigue could be related to hyperparathyroidism and hypercalcemia. Our office will call endocrinology at [facility] and attempt to make the appointment for her. Patient is agreeable with this plan    2. SOB (shortness of breath)  3. Chronic obstructive pulmonary disease with acute lower respiratory infection (HCC)    -Stable at this time. Patient continues taking Symbicort with samples from our office, but we are out of samples at this time. Will provide patient with sample of Trelegy   -Continued smoking cessation encouraged    4. Hyperparathyroidism ([address]  5. Hypercalcemia    -See above regarding endocrinology referral    6. Mammogram decline    -Patient declines orders today due to COVID-19.    7. Pre-Diabetes     -Declined A1c in office as she has received kit from [facility] to do this at home. Doreen received counseling on the following healthy behaviors: nutrition, exercise, medication adherence and tobacco cessation  Reviewed prior labs and health maintenance  Continue current medications, diet and exercise. Discussed use, benefit, and side effects of prescribed medications. Barriers to medication compliance addressed.    Patient given educational materials - see patient instructions  Was a self-tracking handout given in paper form or via [address]? Yes    Requested Prescriptions      No prescriptions requested or ordered in this encounter       All patient questions answered. Patient voiced understanding. Quality Measures    Body mass index is 28.3 kg/m². Elevated. Weight control planned discussed Healthy diet and regular exercise. BP: 115/73 Blood pressure is normal. Treatment plan consists of No treatment change needed. Lab Results   Component Value Date    LDLCALC 132 (H) 05/18/2019    LDLCHOLESTEROL 155 (H) 08/04/2016    (goal LDL reduction with dx if diabetes is 50% LDL reduction)      No flowsheet data found. Interpretation of Total Score Depression Severity: 1-4 = Minimal depression, 5-9 = Mild depression, 10-14 = Moderate depression, 15-19 = Moderately severe depression, 20-27 = Severe depression     Return in about 3 months (around 3/21/2021) for Fatigue and SOB .     (Please note that portions of this note were completed with a voice recognition program. Efforts were made to edit the dictations but occasionally words are mis-transcribed.)      Electronically signed by ILA Ritter CNP on 12/21/20 at 3:22 PM EST

## 2021-02-24 ENCOUNTER — TELEPHONE (OUTPATIENT)
Dept: FAMILY MEDICINE CLINIC | Age: 61
End: 2021-02-24

## 2021-02-25 ENCOUNTER — TELEMEDICINE (OUTPATIENT)
Dept: FAMILY MEDICINE CLINIC | Age: 61
End: 2021-02-25
Payer: MEDICARE

## 2021-02-25 DIAGNOSIS — L05.01 PILONIDAL CYST WITH ABSCESS: Primary | ICD-10-CM

## 2021-02-25 PROCEDURE — 99442 PR PHYS/QHP TELEPHONE EVALUATION 11-20 MIN: CPT | Performed by: NURSE PRACTITIONER

## 2021-02-25 RX ORDER — CLINDAMYCIN HYDROCHLORIDE 300 MG/1
300 CAPSULE ORAL 2 TIMES DAILY
Qty: 20 CAPSULE | Refills: 0 | Status: SHIPPED | OUTPATIENT
Start: 2021-02-25 | End: 2021-03-07

## 2021-02-25 RX ORDER — LACTOBACILLUS RHAMNOSUS GG 10B CELL
1 CAPSULE ORAL DAILY
Qty: 30 CAPSULE | Refills: 5 | Status: SHIPPED | OUTPATIENT
Start: 2021-02-25

## 2021-02-25 RX ORDER — CIPROFLOXACIN 250 MG/1
250 TABLET, FILM COATED ORAL 2 TIMES DAILY
Qty: 20 TABLET | Refills: 0 | Status: SHIPPED | OUTPATIENT
Start: 2021-02-25 | End: 2021-03-02 | Stop reason: ALTCHOICE

## 2021-02-25 SDOH — ECONOMIC STABILITY: FOOD INSECURITY: WITHIN THE PAST 12 MONTHS, THE FOOD YOU BOUGHT JUST DIDN'T LAST AND YOU DIDN'T HAVE MONEY TO GET MORE.: NEVER TRUE

## 2021-02-25 SDOH — ECONOMIC STABILITY: TRANSPORTATION INSECURITY
IN THE PAST 12 MONTHS, HAS THE LACK OF TRANSPORTATION KEPT YOU FROM MEDICAL APPOINTMENTS OR FROM GETTING MEDICATIONS?: NO

## 2021-02-25 ASSESSMENT — ENCOUNTER SYMPTOMS: EYES NEGATIVE: 1

## 2021-02-25 NOTE — ASSESSMENT & PLAN NOTE
Given patient's history of prior pillion gentry cyst I have referred her to general surgery for further evaluation and possible debridement of abscess. Advised patient to take double antibiotic therapy for at minimum of 7 days but 10 if tolerable. Advised to take Culturelle daily and for 5 to 7 days status post antibiotic completion. Advised patient to notify office of any worsening symptoms of infection ASAP.

## 2021-02-25 NOTE — PROGRESS NOTES
[facility]  [facility]  [address]  [facility] [address]  Dept: [phone]    2/25/2021    Consent:  She and/or health care decision maker is aware that that she may receive a bill for this telephone service, depending on her insurance coverage, and has provided verbal consent to proceed: Yes    Lacey Gottron is a 62 y.o. female evaluated via telephone on 2/25/2021. HPI    Armando Beck is a 62 y.o. female who presents   Chief Complaint   Patient presents with    Cyst     Appointment to discuss recurrent pilonidal cyst.     Pilonidal cyst -First noted Sunday. She notes a numb feeling along the area of her cyst. She indicates that she is also noting pain, redness and warmth. She denies fevers. Remote history of cyst in 1982 that was debrided. Denies recurrence until now. Patient-Reported Vitals 2/25/2021   Patient-Reported Weight 200lb   Patient-Reported Height 3vv07lm      There were no vitals filed for this visit. REVIEW OF SYSTEMS    Review of Systems   Constitutional: Negative. Negative for chills, fatigue and fever. Eyes: Negative. Negative for visual disturbance. Skin: Positive for wound. Neurological: Positive for numbness. PAST MEDICAL HISTORY    Past Medical History:   Diagnosis Date    Adenoma of right adrenal gland     Stable 4/2019. No repeat imaging advised.      COPD (chronic obstructive pulmonary disease) ([facility] [address]     Depression     Pneumonia 2017 & 2019    twice     Pre-diabetes        FAMILY HISTORY    Family History   Problem Relation Age of Onset    Heart Disease Mother     Diabetes Mother     Heart Attack Mother 36        COD, she had 3 heart attacks     Heart Disease Father     Diabetes Father     Heart Attack Father 50        COD     Diabetes Sister     Hypertension Sister     Atrial Fibrillation Sister     Diabetes type 2  Maternal Grandmother         COD    Heart Disease Maternal Grandfather         COD    Diabetes type 2  Paternal Grandmother     Heart Disease Paternal Grandmother     Diabetes type 2  Paternal Grandfather     Heart Disease Paternal Grandfather     Stroke Brother         COD     Alcohol Abuse Brother     Hypertension Brother     ADHD Brother     Heart Attack Brother 36        several     Heart Attack Paternal Uncle     Pancreatic Cancer Paternal Uncle     Pancreatic Cancer Paternal Uncle        SOCIAL HISTORY    Social History     Socioeconomic History    Marital status:       Spouse name: None    Number of children: None    Years of education: None    Highest education level: None   Occupational History    None   Social Needs    Financial resource strain: Not hard at all   [place] insecurity     Worry: Never true     Inability: Never true   [employer] needs     Medical: No     Non-medical: No   Tobacco Use    Smoking status: Current Every Day Smoker     Packs/day: 0.50     Years: 35.00     Pack years: 17.50     Types: Cigarettes     Start date: 9/30/1980    Smokeless tobacco: Never Used   Substance and Sexual Activity    Alcohol use: No    Drug use: No    Sexual activity: None   Lifestyle    Physical activity     Days per week: None     Minutes per session: None    Stress: None   Relationships    Social connections     Talks on phone: None     Gets together: None     Attends Buddhism service: None     Active member of club or organization: None     Attends meetings of clubs or organizations: None     Relationship status: None    Intimate partner violence     Fear of current or ex partner: None     Emotionally abused: None     Physically abused: None     Forced sexual activity: None   Other Topics Concern    None   Social History Narrative    None       SURGICAL HISTORY    Past Surgical History:   Procedure Laterality Date    CATARACT REMOVAL Bilateral     LUMBAR LAMINECTOMY  1991    TOOTH EXTRACTION  2019       CURRENT MEDICATIONS    Current Outpatient Medications   Medication Sig Dispense Refill    clindamycin (CLEOCIN) 300 MG capsule Take 1 capsule by mouth 2 times daily for 10 days 20 capsule 0    ciprofloxacin (CIPRO) 250 MG tablet Take 1 tablet by mouth 2 times daily for 10 days 20 tablet 0    lactobacillus (CULTURELLE) CAPS capsule Take 1 capsule by mouth daily 30 capsule 5    atorvastatin (LIPITOR) 10 MG tablet TAKE ONE TABLET BY MOUTH ONCE NIGHTLY 90 tablet 1    budesonide-formoterol (SYMBICORT) 160-4.5 MCG/ACT AERO Inhale 2 puffs into the lungs 2 times daily Lot # [id number], Exp Date 07/2021, [address] # 0535-1330-15 1 Inhaler 0    Handicap Placard MISC by Does not apply route Expires in 5 years ( 2025 ) 1 each 0    FLUoxetine (PROZAC) 40 MG capsule Take 2 capsules by mouth daily       albuterol (PROVENTIL) (2.5 MG/3ML) 0.083% nebulizer solution Take 3 mLs by nebulization every 2 hours as needed for Wheezing 120 each 3    aspirin 81 MG tablet Take 81 mg by mouth daily       No current facility-administered medications for this visit. ALLERGIES    Allergies   Allergen Reactions    Cephalosporins Anaphylaxis    Persantine [Dipyridamole] Hives    Ceftin [Cefuroxime Axetil]      Pt unable to recall type of reaction she had from ceftin    Penicillins        PHYSICAL EXAM   Physical Exam     It was requested that patient make video visit to discuss this, but she was unable to do so. Due to the telephone nature of this visit a physical exam was unable to be performed. Patient was talking in full sentences with no shortness of breath or dyspnea noted during telephone call. No distress noted in tone of voice. Doreen received counseling on the following healthy behaviors: nutrition, exercise and medication adherence  Reviewed prior labs and health maintenance. Continue current medications, diet and exercise. Discussed use, benefit, and side effects of prescribed medications.  Barriers to medication compliance addressed. Patient given educational materials - see patient instructions. All patient questions answered. Patient voiced understanding. ASSESSMENT/PLAN  1. Pilonidal cyst with abscess  Assessment & Plan:  Given patient's history of prior pillion gentry cyst I have referred her to general surgery for further evaluation and possible debridement of abscess. Advised patient to take double antibiotic therapy for at minimum of 7 days but 10 if tolerable. Advised to take Culturelle daily and for 5 to 7 days status post antibiotic completion. Advised patient to notify office of any worsening symptoms of infection ASAP. Orders:  -     clindamycin (CLEOCIN) 300 MG capsule; Take 1 capsule by mouth 2 times daily for 10 days, Disp-20 capsule, R-0Normal  -     ciprofloxacin (CIPRO) 250 MG tablet; Take 1 tablet by mouth 2 times daily for 10 days, Disp-20 tablet, R-0Normal  -     lactobacillus (CULTURELLE) CAPS capsule; Take 1 capsule by mouth daily, Disp-30 capsule, R-5Normal  -     [facility] Arriaga DO, [facility]       Return if symptoms worsen or fail to improve. Documentation:  I communicated with the patient and/or health care decision maker about pilonidal cyst.   Details of this discussion including any medical advice provided: see above    I affirm this is a Patient Initiated Episode with an Established Patient who has not had a related appointment within my department in the past 7 days or scheduled within the next 24 hours.     Total Time: minutes: 11-20 minutes    Note: not billable if this call serves to triage the patient into an appointment for the relevant concern    (Please note that portions of this note were completed with a voice recognition program. Efforts were made to edit the dictations but occasionally words are mis-transcribed.)     Electronically signed by ILA Hall CNP on 2/25/21 at 9:10 AM EST

## 2021-03-02 ENCOUNTER — OFFICE VISIT (OUTPATIENT)
Dept: SURGERY | Age: 61
End: 2021-03-02
Payer: MEDICARE

## 2021-03-02 VITALS
BODY MASS INDEX: 29.32 KG/M2 | HEART RATE: 72 BPM | OXYGEN SATURATION: 96 % | WEIGHT: 204.8 LBS | SYSTOLIC BLOOD PRESSURE: 105 MMHG | DIASTOLIC BLOOD PRESSURE: 54 MMHG | HEIGHT: 70 IN

## 2021-03-02 DIAGNOSIS — L02.31 ABSCESS, GLUTEAL, LEFT: Primary | ICD-10-CM

## 2021-03-02 PROCEDURE — 99203 OFFICE O/P NEW LOW 30 MIN: CPT | Performed by: SURGERY

## 2021-03-02 NOTE — PROGRESS NOTES
[id number] [facility]   History & Physical  Amol WhittakerDO  Pt Name: Gricelda Newby  MRN: [id number]  YOB: 1960  Date of evaluation: 3/2/2021  Primary Care Physician: ILA Zuniga CNP    Chief Complaint: gluteal abscess      SUBJECTIVE:    History of Present Illness: This is a 62 y.o.  female who presents for evaluation for abscess, onset last week, pt reports some radiating pain from that area but has gotten better on antibiotics. Distant history of pilonidal cyst excision. Current smoker. No constitutional symptoms, no current drainage or pain. Her niece is also present today. Chart review performed to add information to the HPI: Yes    Past Medical History   has a past medical history of Adenoma of right adrenal gland, COPD (chronic obstructive pulmonary disease) ([facility] [address] Depression, Pneumonia, and Pre-diabetes. Past Surgical History   has a past surgical history that includes lumbar laminectomy (1991); Cataract removal (Bilateral); and Tooth Extraction (2019). Family History  family history includes ADHD in her brother; Alcohol Abuse in her brother; Atrial Fibrillation in her sister; Diabetes in her father, mother, and sister; Diabetes type 2  in her maternal grandmother, paternal grandfather, and paternal grandmother; Heart Attack in her paternal uncle; Heart Attack (age of onset: 36) in her brother and mother; Heart Attack (age of onset: 50) in her father; Heart Disease in her father, maternal grandfather, mother, paternal grandfather, and paternal grandmother; Hypertension in her brother and sister; Pancreatic Cancer in her paternal uncle and paternal uncle; Stroke in her brother. Social History  Tobacco use:  reports that she has been smoking cigarettes. She started smoking about 40 years ago. She has a 17.50 pack-year smoking history. She has never used smokeless tobacco.  Alcohol use:  reports no history of alcohol use.   Drug use:  reports no history of drug use.      Medications  Current Medications:   Current Outpatient Medications   Medication Sig Dispense Refill    clindamycin (CLEOCIN) 300 MG capsule Take 1 capsule by mouth 2 times daily for 10 days 20 capsule 0    lactobacillus (CULTURELLE) CAPS capsule Take 1 capsule by mouth daily 30 capsule 5    atorvastatin (LIPITOR) 10 MG tablet TAKE ONE TABLET BY MOUTH ONCE NIGHTLY 90 tablet 1    budesonide-formoterol (SYMBICORT) 160-4.5 MCG/ACT AERO Inhale 2 puffs into the lungs 2 times daily Lot # [id number], Exp Date 07/2021, [facility] # [id number] 1 Inhaler 0    Handicap Placard MISC by Does not apply route Expires in 5 years ( 2025 ) 1 each 0    FLUoxetine (PROZAC) 40 MG capsule Take 2 capsules by mouth daily       albuterol (PROVENTIL) (2.5 MG/3ML) 0.083% nebulizer solution Take 3 mLs by nebulization every 2 hours as needed for Wheezing 120 each 3    aspirin 81 MG tablet Take 81 mg by mouth daily       No current facility-administered medications for this visit. Home Medications:   Prior to Admission medications    Medication Sig Start Date End Date Taking?  Authorizing Provider   clindamycin (CLEOCIN) 300 MG capsule Take 1 capsule by mouth 2 times daily for 10 days 2/25/21 3/7/21 Yes ILA Stephens CNP   lactobacillus (CULTURELLE) CAPS capsule Take 1 capsule by mouth daily 2/25/21  Yes ILA Stephens CNP   atorvastatin (LIPITOR) 10 MG tablet TAKE ONE TABLET BY MOUTH ONCE NIGHTLY 11/16/20  Yes ILA Stephens CNP   budesonide-formoterol [facility]) 160-4.5 MCG/ACT AERO Inhale 2 puffs into the lungs 2 times daily Lot # [id number], Exp Date 07/2021, [facility] # [id number] 10/2/20  Yes ILA Stephens CNP   Handicap Placard MISC by Does not apply route Expires in 5 years ( 2025 ) 8/19/20  Yes ILA Stephens CNP   FLUoxetine (PROZAC) 40 MG capsule Take 2 capsules by mouth daily  5/7/19  Yes Historical Provider, MD   albuterol (PROVENTIL) (2.5 MG/3ML) 0.083% nebulizer solution Take visible masses. Lungs:  Normal chest expansion, unlabored breathing without accessory muscle use. No audible rales, rhonchi, or wheezing. Cardiovascular: S1S2. No evidence of JVD. No evidence of pulsatile masses in abdomen  Abdomen:  Soft, non-tender, no organomegaly, no masses. Musculoskeletal: No evidence of bony/muscular deformities, trauma, atrophy of either left/right upper/lower extremity. No evidence of digital clubbing or cyanosis. Neurologic:  CN 2-12 grossly intact without obvious deficits. Grossly normal sensation in all extremities. Psychiatric: appropriate judgement and insight, appropriate recall of recent and remote memory, no evidence of depression/anxiety/agitation    DIAGNOSES:   Diagnosis Orders   1. Abscess, gluteal, left         PLAN:  · Continue nonoperative management with antibiotics, ok to stop Cipro and continue Clindamycin, f/u in 2 weeks, sooner if pt feels healing is not progressing well. We discussed the small possibility that this is an atypical presentation of tunneling from the previous pilonidal cyst site but this seems unlikely at this time. Urged pt to quit smoking. All questions were answered, pt is agreeable to this plan.   ·       Medical Decision Making: low complexity     Electronically signed by Sheryl Padgett DO on 3/2/2021 at 10:58 AM

## 2021-03-02 NOTE — LETTER
[address]  Phone: [phone]  Fax: [phone]      3/2/21    Patient: Aramndo Beck  MRN: [id number]  : 1960  Date of visit: 3/2/2021    Dear ILA Davidson - CNP:      Thank you for requesting consultation for Armando Beck in regards to evaluation for gluteal abscess. Below are the relevant portions of my assessment and plan of care. If you have questions, please do not hesitate to call me. I look forward to following Armando Beck along with you.     Sincerely,        Francis Montano, DO

## 2021-06-08 ENCOUNTER — OFFICE VISIT (OUTPATIENT)
Dept: FAMILY MEDICINE CLINIC | Age: 61
End: 2021-06-08
Payer: MEDICARE

## 2021-06-08 VITALS
TEMPERATURE: 97.5 F | SYSTOLIC BLOOD PRESSURE: 110 MMHG | OXYGEN SATURATION: 97 % | BODY MASS INDEX: 28.95 KG/M2 | HEART RATE: 75 BPM | RESPIRATION RATE: 16 BRPM | HEIGHT: 70 IN | DIASTOLIC BLOOD PRESSURE: 60 MMHG | WEIGHT: 202.2 LBS

## 2021-06-08 DIAGNOSIS — E11.65 TYPE 2 DIABETES MELLITUS WITH HYPERGLYCEMIA, WITHOUT LONG-TERM CURRENT USE OF INSULIN (HCC): Primary | ICD-10-CM

## 2021-06-08 DIAGNOSIS — J44.0 CHRONIC OBSTRUCTIVE PULMONARY DISEASE WITH ACUTE LOWER RESPIRATORY INFECTION (HCC): ICD-10-CM

## 2021-06-08 DIAGNOSIS — R06.02 SOB (SHORTNESS OF BREATH): ICD-10-CM

## 2021-06-08 DIAGNOSIS — Z12.11 SCREENING FOR COLORECTAL CANCER: ICD-10-CM

## 2021-06-08 DIAGNOSIS — Z12.12 SCREENING FOR COLORECTAL CANCER: ICD-10-CM

## 2021-06-08 LAB — HBA1C MFR BLD: 7.3 %

## 2021-06-08 PROCEDURE — 3051F HG A1C>EQUAL 7.0%<8.0%: CPT | Performed by: NURSE PRACTITIONER

## 2021-06-08 PROCEDURE — 99214 OFFICE O/P EST MOD 30 MIN: CPT | Performed by: NURSE PRACTITIONER

## 2021-06-08 PROCEDURE — 83036 HEMOGLOBIN GLYCOSYLATED A1C: CPT | Performed by: NURSE PRACTITIONER

## 2021-06-08 RX ORDER — ALBUTEROL SULFATE 2.5 MG/3ML
2.5 SOLUTION RESPIRATORY (INHALATION) EVERY 6 HOURS PRN
Qty: 120 EACH | Refills: 5 | Status: SHIPPED | OUTPATIENT
Start: 2021-06-08

## 2021-06-08 RX ORDER — BUDESONIDE AND FORMOTEROL FUMARATE DIHYDRATE 160; 4.5 UG/1; UG/1
2 AEROSOL RESPIRATORY (INHALATION) 2 TIMES DAILY
Qty: 1 INHALER | Refills: 0 | Status: SHIPPED | OUTPATIENT
Start: 2021-06-08 | End: 2022-03-15 | Stop reason: SDUPTHER

## 2021-06-08 RX ORDER — BLOOD-GLUCOSE METER
1 KIT MISCELLANEOUS DAILY
Qty: 1 KIT | Refills: 0 | Status: SHIPPED | OUTPATIENT
Start: 2021-06-08 | End: 2022-09-19 | Stop reason: ALTCHOICE

## 2021-06-08 RX ORDER — LANCETS 30 GAUGE
1 EACH MISCELLANEOUS 2 TIMES DAILY
Qty: 100 EACH | Refills: 11 | Status: SHIPPED | OUTPATIENT
Start: 2021-06-08 | End: 2022-09-19 | Stop reason: ALTCHOICE

## 2021-06-08 RX ORDER — GLUCOSAMINE HCL/CHONDROITIN SU 500-400 MG
CAPSULE ORAL
Qty: 100 STRIP | Refills: 11 | Status: SHIPPED | OUTPATIENT
Start: 2021-06-08 | End: 2022-09-19 | Stop reason: ALTCHOICE

## 2021-06-08 SDOH — ECONOMIC STABILITY: TRANSPORTATION INSECURITY
IN THE PAST 12 MONTHS, HAS LACK OF TRANSPORTATION KEPT YOU FROM MEETINGS, WORK, OR FROM GETTING THINGS NEEDED FOR DAILY LIVING?: NO

## 2021-06-08 SDOH — ECONOMIC STABILITY: FOOD INSECURITY: WITHIN THE PAST 12 MONTHS, THE FOOD YOU BOUGHT JUST DIDN'T LAST AND YOU DIDN'T HAVE MONEY TO GET MORE.: NEVER TRUE

## 2021-06-08 SDOH — ECONOMIC STABILITY: FOOD INSECURITY: WITHIN THE PAST 12 MONTHS, YOU WORRIED THAT YOUR FOOD WOULD RUN OUT BEFORE YOU GOT MONEY TO BUY MORE.: NEVER TRUE

## 2021-06-08 ASSESSMENT — SOCIAL DETERMINANTS OF HEALTH (SDOH): HOW HARD IS IT FOR YOU TO PAY FOR THE VERY BASICS LIKE FOOD, HOUSING, MEDICAL CARE, AND HEATING?: NOT HARD AT ALL

## 2021-06-08 ASSESSMENT — PATIENT HEALTH QUESTIONNAIRE - PHQ9
1. LITTLE INTEREST OR PLEASURE IN DOING THINGS: 1
SUM OF ALL RESPONSES TO PHQ QUESTIONS 1-9: 2
SUM OF ALL RESPONSES TO PHQ QUESTIONS 1-9: 2
SUM OF ALL RESPONSES TO PHQ9 QUESTIONS 1 & 2: 2
SUM OF ALL RESPONSES TO PHQ QUESTIONS 1-9: 2
2. FEELING DOWN, DEPRESSED OR HOPELESS: 1

## 2021-06-08 NOTE — PROGRESS NOTES
Depression screening done  Financial Resource Strain Completed  Chief Complaint   Patient presents with    Hyperlipidemia    Diabetes    COPD

## 2021-06-08 NOTE — PROGRESS NOTES
3.2 oz (89.4 kg)     BP Readings from Last 3 Encounters:   06/08/21 110/60   03/02/21 (!) 105/54   12/21/20 115/73       REVIEW OF SYSTEMS    Review of Systems   Constitutional: Negative. Negative for chills and fever. HENT: Negative for sore throat. Eyes: Negative. Negative for visual disturbance. Respiratory: Positive for shortness of breath ( See HPI). Negative for cough. Cardiovascular: Negative. Negative for chest pain and palpitations (No recurreance in several months ). Gastrointestinal: Negative. Negative for abdominal pain, constipation, diarrhea, nausea and vomiting. Dysphagia. All testing came back within normal limits   Genitourinary: Negative. Negative for difficulty urinating. Musculoskeletal: Negative. Negative for myalgias. Neurological: Negative. Negative for dizziness and headaches. PAST MEDICAL HISTORY    Past Medical History:   Diagnosis Date    Adenoma of right adrenal gland     Stable 4/2019. No repeat imaging advised.      COPD (chronic obstructive pulmonary disease) ([facility] [address]     Depression     Pneumonia 2017 & 2019    twice     Pre-diabetes        FAMILY HISTORY    Family History   Problem Relation Age of Onset    Heart Disease Mother     Diabetes Mother     Heart Attack Mother 36        COD, she had 3 heart attacks     Heart Disease Father     Diabetes Father     Heart Attack Father 50        COD     Diabetes Sister     Hypertension Sister     Atrial Fibrillation Sister     Diabetes type 2  Maternal Grandmother         COD    Heart Disease Maternal Grandfather         COD    Diabetes type 2  Paternal Grandmother     Heart Disease Paternal Grandmother     Diabetes type 2  Paternal Grandfather     Heart Disease Paternal Grandfather     Stroke Brother         COD     Alcohol Abuse Brother     Hypertension Brother     ADHD Brother     Heart Attack Brother 36        several     Heart Attack Paternal Uncle     Pancreatic Cancer Paternal Uncle     Pancreatic Cancer Paternal Uncle        SOCIAL HISTORY    Social History     Socioeconomic History    Marital status:      Spouse name: None    Number of children: None    Years of education: None    Highest education level: None   Occupational History    None   Tobacco Use    Smoking status: Current Every Day Smoker     Packs/day: 0.50     Years: 35.00     Pack years: 17.50     Types: Cigarettes     Start date: 9/30/1980    Smokeless tobacco: Never Used   Vaping Use    Vaping Use: Never used   Substance and Sexual Activity    Alcohol use: No    Drug use: No    Sexual activity: None   Other Topics Concern    None   Social History Narrative    None     Social Determinants of Health     Financial Resource Strain: Low Risk     Difficulty of Paying Living Expenses: Not hard at all   Food Insecurity: No Food Insecurity    Worried About Running Out of Food in the Last Year: Never true    [place] of Food in the Last Year: Never true   Transportation Needs: No Transportation Needs    Lack of Transportation (Medical): No    Lack of Transportation (Non-Medical):  No   Physical Activity:     Days of Exercise per Week:     Minutes of Exercise per Session:    Stress:     Feeling of Stress :    Social Connections:     Frequency of Communication with Friends and Family:     Frequency of Social Gatherings with Friends and Family:     Attends Taoist Services:     Active Member of Clubs or Organizations:     Attends Club or Organization Meetings:     Marital Status:    Intimate Partner Violence:     Fear of Current or Ex-Partner:     Emotionally Abused:     Physically Abused:     Sexually Abused:        SURGICAL HISTORY    Past Surgical History:   Procedure Laterality Date    CATARACT REMOVAL Bilateral    [id number] [address] EXTRACTION  2019       CURRENT MEDICATIONS    Current Outpatient Medications   Medication Sig Dispense Refill    Budeson-Glycopyrrol-Formoterol 160-9-4.8 MCG/ACT AERO Inhale 1 puff into the lungs 2 times daily (Lot #: [id number], Exp: 09/30/2022 1 Inhaler 0    albuterol (PROVENTIL) (2.5 MG/3ML) 0.083% nebulizer solution Take 3 mLs by nebulization every 6 hours as needed for Wheezing or Shortness of Breath 120 each 5    budesonide-formoterol (SYMBICORT) 160-4.5 MCG/ACT AERO Inhale 2 puffs into the lungs 2 times daily 1 Inhaler 0    glucose monitoring kit (FREESTYLE) monitoring kit 1 kit by Does not apply route daily 1 kit 0    Lancets MISC 1 each by Does not apply route 2 times daily 100 each 11    blood glucose monitor strips Test 2 times a day & as needed for symptoms of irregular blood glucose. 100 strip 11    metFORMIN (GLUCOPHAGE) 500 MG tablet Take 1 tablet by mouth daily (with breakfast) for 7 days, THEN 1 tablet 2 times daily (with meals) for 7 days. 60 tablet 5    lactobacillus (CULTURELLE) CAPS capsule Take 1 capsule by mouth daily 30 capsule 5    atorvastatin (LIPITOR) 10 MG tablet TAKE ONE TABLET BY MOUTH ONCE NIGHTLY 90 tablet 1    Handicap Placard MISC by Does not apply route Expires in 5 years ( 2025 ) 1 each 0    FLUoxetine (PROZAC) 40 MG capsule Take 2 capsules by mouth daily       aspirin 81 MG tablet Take 81 mg by mouth daily       No current facility-administered medications for this visit. ALLERGIES    Allergies   Allergen Reactions    Cephalosporins Anaphylaxis    Persantine [Dipyridamole] Hives    Ceftin [Cefuroxime Axetil]      Pt unable to recall type of reaction she had from ceftin    Penicillins        PHYSICAL EXAM   Physical Exam  Vitals and nursing note reviewed. Constitutional:       General: She is not in acute distress. Appearance: She is well-developed. She is not diaphoretic. Interventions: Face mask in place. HENT:      Head: Normocephalic. Right Ear: Hearing normal.      Left Ear: Hearing normal.   Eyes:      General:         Right eye: No discharge. Left eye: No discharge. Pupils: Pupils are equal.   Cardiovascular:      Rate and Rhythm: Normal rate and regular rhythm. Pulses: Normal pulses. Radial pulses are 2+ on the right side and 2+ on the left side. Heart sounds: Normal heart sounds, S1 normal and S2 normal. No murmur heard. Pulmonary:      Effort: Pulmonary effort is normal. No respiratory distress. Breath sounds: Normal breath sounds. No wheezing. Musculoskeletal:      Cervical back: Normal range of motion. Skin:     General: Skin is warm and dry. Neurological:      Mental Status: She is alert and oriented to person, place, and time. Psychiatric:         Behavior: Behavior normal. Behavior is cooperative. ASSESSMENT/PLAN  1. Type 2 diabetes mellitus with hyperglycemia, without long-term current use of insulin ([facility])  Assessment & Plan:   Uncontrolled, changes made today: We will proceed with titration up on Metformin as previously instructed up to 2000 mg daily. Advised to work on dietary and exercise modifications as she has now become a diabetic and is no longer in the prediabetic category. Patient voices understanding of plan of care and importance of making changes. Patient will begin checking fasting blood sugars 2 -3 imes per week and will bring log of blood sugars to next follow-up appointment  Orders:  -     POCT glycosylated hemoglobin (Hb A1C)  -     glucose monitoring kit (FREESTYLE) monitoring kit; DAILY Starting Tue 6/8/2021, Disp-1 kit, R-0, Normal  -     Lancets MISC; 2 TIMES DAILY Starting Tue 6/8/2021, Disp-100 each, R-11, Normal  -     blood glucose monitor strips; Test 2 times a day & as needed for symptoms of irregular blood glucose., Disp-100 strip, R-11, Normal  -     metFORMIN (GLUCOPHAGE) 500 MG tablet; Take 1 tablet by mouth daily (with breakfast) for 7 days, THEN 1 tablet 2 times daily (with meals) for 7 days. , Disp-60 tablet, R-5Normal  2.  Chronic obstructive pulmonary disease with acute lower respiratory infection ([facility] [address]  Assessment & Plan:   Borderline controlled, changes made today: Will provide prescription for Symbicort to pharmacy in hopes that the medication is now affordable as it is generic. She has been given a sample of Breztri. She has been advised that this medication is different than Symbicort as it has a third medication. Orders:  -     albuterol (PROVENTIL) (2.5 MG/3ML) 0.083% nebulizer solution; Take 3 mLs by nebulization every 6 hours as needed for Wheezing or Shortness of Breath, Disp-120 each, R-5Normal  -     budesonide-formoterol (SYMBICORT) 160-4.5 MCG/ACT AERO; Inhale 2 puffs into the lungs 2 times daily, Disp-1 Inhaler, R-0Normal  -     Budeson-Glycopyrrol-Formoterol 160-9-4.8 MCG/ACT AERO; Inhale 1 puff into the lungs 2 times daily (Lot #: [id number], Exp: 09/30/2022, Disp-1 Inhaler, R-0Sample  3. SOB (shortness of breath)  -     albuterol (PROVENTIL) (2.5 MG/3ML) 0.083% nebulizer solution; Take 3 mLs by nebulization every 6 hours as needed for Wheezing or Shortness of Breath, Disp-120 each, R-5Normal  -     budesonide-formoterol (SYMBICORT) 160-4.5 MCG/ACT AERO; Inhale 2 puffs into the lungs 2 times daily, Disp-1 Inhaler, R-0Normal  4. Screening for colorectal cancer  -     Cologuard (For External Results Only); Future  -Patient given information for Cologuard and advised to call to check insurance coverage prior to completing testing    Doreen received counseling on the following healthy behaviors: nutrition, exercise, medication adherence and tobacco cessation  Reviewed prior labs and health maintenance  Continue current medications, diet and exercise. Discussed use, benefit, and side effects of prescribed medications. Barriers to medication compliance addressed. Patient given educational materials - see patient instructions  Was a self-tracking handout given in paper form or via [employer]?  Yes    Requested Prescriptions     Signed Prescriptions Disp Refills    albuterol (PROVENTIL) (2.5 MG/3ML) 0.083% nebulizer solution 120 each 5     Sig: Take 3 mLs by nebulization every 6 hours as needed for Wheezing or Shortness of Breath    budesonide-formoterol (SYMBICORT) 160-4.5 MCG/ACT AERO 1 Inhaler 0     Sig: Inhale 2 puffs into the lungs 2 times daily    glucose monitoring kit (FREESTYLE) monitoring kit 1 kit 0     Si kit by Does not apply route daily    Lancets MISC 100 each 11     Si each by Does not apply route 2 times daily    blood glucose monitor strips 100 strip 11     Sig: Test 2 times a day & as needed for symptoms of irregular blood glucose.  metFORMIN (GLUCOPHAGE) 500 MG tablet 60 tablet 5     Sig: Take 1 tablet by mouth daily (with breakfast) for 7 days, THEN 1 tablet 2 times daily (with meals) for 7 days.  Budeson-Glycopyrrol-Formoterol 160-9-4.8 MCG/ACT AERO 1 Inhaler 0     Sig: Inhale 1 puff into the lungs 2 times daily (Lot #: [id number], Exp: 2022       All patient questions answered. Patient voiced understanding. Quality Measures    Body mass index is 29.01 kg/m². Elevated. Weight control planned discussed Healthy diet and regular exercise. BP: 110/60 Blood pressure is normal. Treatment plan consists of No treatment change needed. Lab Results   Component Value Date    LDLCALC 132 (H) 2019    LDLCHOLESTEROL 155 (H) 2016    (goal LDL reduction with dx if diabetes is 50% LDL reduction)      PHQ Scores 2021   PHQ2 Score 2   PHQ9 Score 2     Interpretation of Total Score Depression Severity: 1-4 = Minimal depression, 5-9 = Mild depression, 10-14 = Moderate depression, 15-19 = Moderately severe depression, 20-27 = Severe depression     Return in about 4 months (around 10/8/2021) for diabetes, HgbA1C.     (Please note that portions of this note were completed with a voice recognition program. Efforts were made to edit the dictations but occasionally words are mis-transcribed.)      Electronically signed by ILA Ashton CNP on 6/8/21 at 11:28 AM EDT

## 2021-06-27 PROBLEM — E11.65 TYPE 2 DIABETES MELLITUS WITH HYPERGLYCEMIA, WITHOUT LONG-TERM CURRENT USE OF INSULIN (HCC): Status: ACTIVE | Noted: 2021-06-27

## 2021-06-27 ASSESSMENT — ENCOUNTER SYMPTOMS
EYES NEGATIVE: 1
SHORTNESS OF BREATH: 1
SORE THROAT: 0
ABDOMINAL PAIN: 0
CONSTIPATION: 0
COUGH: 0
VOMITING: 0
DIARRHEA: 0
GASTROINTESTINAL NEGATIVE: 1
NAUSEA: 0

## 2021-06-27 NOTE — ASSESSMENT & PLAN NOTE
Uncontrolled, changes made today: We will proceed with titration up on Metformin as previously instructed up to 2000 mg daily. Advised to work on dietary and exercise modifications as she has now become a diabetic and is no longer in the prediabetic category. Patient voices understanding of plan of care and importance of making changes.      Patient will begin checking fasting blood sugars 2 -3 imes per week and will bring log of blood sugars to next follow-up appointment

## 2021-06-27 NOTE — ASSESSMENT & PLAN NOTE
Borderline controlled, changes made today: Will provide prescription for Symbicort to pharmacy in hopes that the medication is now affordable as it is generic. She has been given a sample of Breztri. She has been advised that this medication is different than Symbicort as it has a third medication.     Results for orders placed or performed in visit on 06/08/21   POCT glycosylated hemoglobin (Hb A1C)   Result Value Ref Range    Hemoglobin A1C 7.3 %

## 2021-07-20 DIAGNOSIS — E78.2 MIXED HYPERLIPIDEMIA: ICD-10-CM

## 2021-07-21 RX ORDER — ATORVASTATIN CALCIUM 10 MG/1
TABLET, FILM COATED ORAL
Qty: 90 TABLET | Refills: 0 | Status: SHIPPED | OUTPATIENT
Start: 2021-07-21 | End: 2022-03-15 | Stop reason: SDUPTHER

## 2021-07-27 DIAGNOSIS — Z12.11 SCREENING FOR COLORECTAL CANCER: ICD-10-CM

## 2021-07-27 DIAGNOSIS — Z12.12 SCREENING FOR COLORECTAL CANCER: ICD-10-CM

## 2021-09-22 ENCOUNTER — TELEPHONE (OUTPATIENT)
Dept: FAMILY MEDICINE CLINIC | Age: 61
End: 2021-09-22

## 2021-09-22 NOTE — TELEPHONE ENCOUNTER
Lesly Duffy was contacted as a part of [employer]. A voicemail message was left reminding the patient to schedule an AWV with their PCP.

## 2021-10-01 ENCOUNTER — TELEPHONE (OUTPATIENT)
Dept: FAMILY MEDICINE CLINIC | Age: 61
End: 2021-10-01

## 2021-10-01 DIAGNOSIS — R09.81 NASAL CONGESTION: Primary | ICD-10-CM

## 2021-10-01 RX ORDER — GUAIFENESIN AND DEXTROMETHORPHAN HYDROBROMIDE 1200; 60 MG/1; MG/1
1 TABLET, EXTENDED RELEASE ORAL EVERY 12 HOURS
Qty: 28 TABLET | Refills: 1 | Status: SHIPPED | OUTPATIENT
Start: 2021-10-01 | End: 2021-11-15

## 2021-10-01 NOTE — TELEPHONE ENCOUNTER
rx sent for mucinex d to her pharmacy. It appears it will be covered by her insurance, but if not a generic version of this would be advised and safe to take with current medications.

## 2021-10-01 NOTE — TELEPHONE ENCOUNTER
Patient called in stating that she is having some congestion and was wondering if she can take any otc medicine with the antidepressant medication she is on and what would be best to take. Please advise thank you.

## 2021-10-08 ENCOUNTER — HOSPITAL ENCOUNTER (OUTPATIENT)
Dept: GENERAL RADIOLOGY | Age: 61
Discharge: HOME | End: 2021-10-10
Payer: MEDICARE

## 2021-10-08 ENCOUNTER — OFFICE VISIT (OUTPATIENT)
Dept: FAMILY MEDICINE CLINIC | Age: 61
End: 2021-10-08
Payer: MEDICARE

## 2021-10-08 ENCOUNTER — HOSPITAL ENCOUNTER (OUTPATIENT)
Age: 61
Discharge: HOME | End: 2021-10-10
Payer: MEDICARE

## 2021-10-08 VITALS
OXYGEN SATURATION: 94 % | HEART RATE: 60 BPM | RESPIRATION RATE: 16 BRPM | DIASTOLIC BLOOD PRESSURE: 66 MMHG | BODY MASS INDEX: 28.8 KG/M2 | HEIGHT: 70 IN | SYSTOLIC BLOOD PRESSURE: 114 MMHG | TEMPERATURE: 97.5 F | WEIGHT: 201.2 LBS

## 2021-10-08 DIAGNOSIS — R05.9 COUGH: ICD-10-CM

## 2021-10-08 DIAGNOSIS — Z12.31 SCREENING MAMMOGRAM FOR BREAST CANCER: ICD-10-CM

## 2021-10-08 DIAGNOSIS — R09.89 CHEST CONGESTION: ICD-10-CM

## 2021-10-08 DIAGNOSIS — J44.0 CHRONIC OBSTRUCTIVE PULMONARY DISEASE WITH ACUTE LOWER RESPIRATORY INFECTION (HCC): ICD-10-CM

## 2021-10-08 DIAGNOSIS — E11.65 TYPE 2 DIABETES MELLITUS WITH HYPERGLYCEMIA, WITHOUT LONG-TERM CURRENT USE OF INSULIN (HCC): Primary | ICD-10-CM

## 2021-10-08 PROBLEM — M19.90 ARTHRITIS: Status: ACTIVE | Noted: 2018-03-14

## 2021-10-08 LAB
CREATININE URINE POCT: ABNORMAL
HBA1C MFR BLD: 6.2 %
MICROALBUMIN/CREAT 24H UR: ABNORMAL MG/G{CREAT}
MICROALBUMIN/CREAT UR-RTO: ABNORMAL

## 2021-10-08 PROCEDURE — 99213 OFFICE O/P EST LOW 20 MIN: CPT | Performed by: NURSE PRACTITIONER

## 2021-10-08 PROCEDURE — 71046 X-RAY EXAM CHEST 2 VIEWS: CPT

## 2021-10-08 PROCEDURE — 83036 HEMOGLOBIN GLYCOSYLATED A1C: CPT | Performed by: NURSE PRACTITIONER

## 2021-10-08 PROCEDURE — 82044 UR ALBUMIN SEMIQUANTITATIVE: CPT | Performed by: NURSE PRACTITIONER

## 2021-10-08 RX ORDER — BUPROPION HYDROCHLORIDE 300 MG/1
300 TABLET ORAL EVERY MORNING
COMMUNITY
Start: 2021-10-01

## 2021-10-08 RX ORDER — AZITHROMYCIN 250 MG/1
250 TABLET, FILM COATED ORAL SEE ADMIN INSTRUCTIONS
Qty: 6 TABLET | Refills: 0 | Status: SHIPPED | OUTPATIENT
Start: 2021-10-08 | End: 2021-10-13

## 2021-10-08 RX ORDER — PREDNISONE 10 MG/1
10 TABLET ORAL 2 TIMES DAILY
Qty: 6 TABLET | Refills: 0 | Status: SHIPPED | OUTPATIENT
Start: 2021-10-08 | End: 2021-10-11

## 2021-10-08 SDOH — ECONOMIC STABILITY: FOOD INSECURITY: WITHIN THE PAST 12 MONTHS, YOU WORRIED THAT YOUR FOOD WOULD RUN OUT BEFORE YOU GOT MONEY TO BUY MORE.: NEVER TRUE

## 2021-10-08 SDOH — ECONOMIC STABILITY: FOOD INSECURITY: WITHIN THE PAST 12 MONTHS, THE FOOD YOU BOUGHT JUST DIDN'T LAST AND YOU DIDN'T HAVE MONEY TO GET MORE.: NEVER TRUE

## 2021-10-08 ASSESSMENT — ENCOUNTER SYMPTOMS
ABDOMINAL PAIN: 0
CONSTIPATION: 0
DIARRHEA: 0
WHEEZING: 1
COUGH: 1
VOMITING: 0
NAUSEA: 1
CHEST TIGHTNESS: 1
SHORTNESS OF BREATH: 1

## 2021-10-08 ASSESSMENT — SOCIAL DETERMINANTS OF HEALTH (SDOH): HOW HARD IS IT FOR YOU TO PAY FOR THE VERY BASICS LIKE FOOD, HOUSING, MEDICAL CARE, AND HEATING?: NOT HARD AT ALL

## 2021-10-08 NOTE — PROGRESS NOTES
[facility]  [facility]  [address]  [facility] [address]  Dept: [phone]    10/8/2021    CHIEF COMPLAINT    Chief Complaint   Patient presents with    Diabetes    Chest Congestion    Cough       HPI    Becca Russell is a 62 y.o. female who presents   Chief Complaint   Patient presents with    Diabetes    Chest Congestion    Cough   . Appointment to f/u on diabetes. Recent chest congestion and cough- Wed-Thursday last week noting scratchy throat,   Left sided chest pain through to her back with coughing and deep breathing. She denies fevers and chills. Cough has been productive the last couple days. Denies any color to sputum. Sister had pneumonia recently and she has helped care for her. She was COVID tested this past Monday and was told Tuesday that it was negative at a [place] clinic. Rapid test done the week prior at home was negative. She has been taking Coricidin HBP cough syrup, sinus medications, albuterol nebulizer 4 x daily. Vitamin C tablets being taken also. Diabetes-continues taking Metformin. She has increased her Metformin as discussed at her last appointment. She continues working on dietary modifications and increased physical activity as able. Lab Results       Component                Value               Date                       LABA1C                   6.2                 10/08/2021                 LABA1C                   7.3                 06/08/2021                 LABA1C                   6.0                 10/23/2019            Lab Results       Component                Value               Date                       EAG                      128                 03/29/2019                Diabetes  She presents for her initial diabetic visit. Pertinent negatives for hypoglycemia include no dizziness or headaches. Associated symptoms include chest pain (left sided with cough ) and fatigue. Cough  This is a new problem. The current episode started 1 to 4 weeks ago. Associated symptoms include chest pain (left sided with cough ), myalgias, shortness of breath and wheezing. Pertinent negatives include no chills, fever, headaches or rash. Vitals:    10/08/21 1036   BP: 114/66   Site: Left Upper Arm   Position: Sitting   Cuff Size: Large Adult   Pulse: 60   Resp: 16   Temp: 97.5 °F (36.4 °C)   TempSrc: Temporal   SpO2: 94%   Weight: 201 lb 3.2 oz (91.3 kg)   Height: 5' 10\" (1.778 m)       Wt Readings from Last 3 Encounters:   10/08/21 201 lb 3.2 oz (91.3 kg)   06/08/21 202 lb 3.2 oz (91.7 kg)   03/02/21 204 lb 12.8 oz (92.9 kg)     BP Readings from Last 3 Encounters:   10/08/21 114/66   06/08/21 110/60   03/02/21 (!) 105/54       REVIEW OF SYSTEMS    Review of Systems   Constitutional: Positive for fatigue. Negative for chills and fever. HENT: Negative. Right sided ear pressure    Respiratory: Positive for cough, chest tightness, shortness of breath and wheezing. Cardiovascular: Positive for chest pain (left sided with cough ). Negative for palpitations. Gastrointestinal: Positive for nausea (at the beginning ). Negative for abdominal pain, constipation, diarrhea and vomiting. Genitourinary: Negative. Negative for difficulty urinating. Musculoskeletal: Positive for myalgias. Skin: Negative. Negative for rash. Neurological: Negative. Negative for dizziness and headaches. PAST MEDICAL HISTORY    Past Medical History:   Diagnosis Date    Adenoma of right adrenal gland     Stable 4/2019. No repeat imaging advised.      COPD (chronic obstructive pulmonary disease) ([address]     Depression     Pneumonia 2017 & 2019    twice     Pre-diabetes     Type 2 diabetes mellitus ([address]        FAMILY HISTORY    Family History   Problem Relation Age of Onset    Heart Disease Mother     Diabetes Mother     Heart Attack Mother 36        COD, she had 3 heart attacks     Heart Disease Father     Diabetes Father     Heart Attack Father 50        COD     Diabetes Sister     Hypertension Sister     Atrial Fibrillation Sister     Diabetes type 2  Maternal Grandmother         COD    Heart Disease Maternal Grandfather         COD    Diabetes type 2  Paternal Grandmother     Heart Disease Paternal Grandmother     Diabetes type 2  Paternal Grandfather     Heart Disease Paternal Grandfather     Stroke Brother         COD     Alcohol Abuse Brother     Hypertension Brother     ADHD Brother     Heart Attack Brother 36        several     Heart Attack Paternal Uncle     Pancreatic Cancer Paternal Uncle     Pancreatic Cancer Paternal Uncle        SOCIAL HISTORY    Social History     Socioeconomic History    Marital status:      Spouse name: None    Number of children: None    Years of education: None    Highest education level: None   Occupational History    None   Tobacco Use    Smoking status: Current Every Day Smoker     Packs/day: 0.50     Years: 35.00     Pack years: 17.50     Types: Cigarettes     Start date: 9/30/1980    Smokeless tobacco: Never Used   Vaping Use    Vaping Use: Never used   Substance and Sexual Activity    Alcohol use: No    Drug use: No    Sexual activity: None   Other Topics Concern    None   Social History Narrative    None     Social Determinants of Health     Financial Resource Strain: Low Risk     Difficulty of Paying Living Expenses: Not hard at all   Food Insecurity: No Food Insecurity    Worried About Running Out of Food in the Last Year: Never true    [place] of Food in the Last Year: Never true   Transportation Needs: No Transportation Needs    Lack of Transportation (Medical): No    Lack of Transportation (Non-Medical):  No   Physical Activity:     Days of Exercise per Week:     Minutes of Exercise per Session:    Stress:     Feeling of Stress :    Social Connections:     Frequency of Communication with Friends and 81 MG tablet Take 81 mg by mouth daily       No current facility-administered medications for this visit. ALLERGIES    Allergies   Allergen Reactions    Cephalosporins Anaphylaxis    Persantine [Dipyridamole] Hives    Ceftin [Cefuroxime Axetil]      Pt unable to recall type of reaction she had from ceftin    Penicillins        PHYSICAL EXAM   Physical Exam  Vitals and nursing note reviewed. Constitutional:       General: She is not in acute distress. Appearance: She is well-developed, well-groomed and overweight. She is not diaphoretic. Interventions: Face mask in place. HENT:      Head: Normocephalic. Right Ear: Hearing normal.      Left Ear: Hearing normal.   Eyes:      General:         Right eye: No discharge. Left eye: No discharge. Pupils: Pupils are equal.   Cardiovascular:      Rate and Rhythm: Normal rate and regular rhythm. Pulses: Normal pulses. Radial pulses are 2+ on the right side and 2+ on the left side. Heart sounds: Normal heart sounds, S1 normal and S2 normal. No murmur heard. Pulmonary:      Effort: Pulmonary effort is normal. No respiratory distress. Breath sounds: No stridor or decreased air movement. Wheezing present. No decreased breath sounds. Comments: Harsh cough heard during office visit  Musculoskeletal:      Cervical back: Normal range of motion. Skin:     General: Skin is warm and dry. Neurological:      Mental Status: She is alert and oriented to person, place, and time. Psychiatric:         Behavior: Behavior normal. Behavior is cooperative. ASSESSMENT/PLAN  1. Type 2 diabetes mellitus with hyperglycemia, without long-term current use of insulin ([facility])  Assessment & Plan:   Borderline controlled, continue current medications and dietary and exercise modifications as previously discussed.  Significant improvement in A1c from 7.3-6.2    Results for orders placed or performed in visit on 10/08/21   POCT glycosylated hemoglobin (Hb A1C)   Result Value Ref Range    Hemoglobin A1C 6.2 %   POCT microalbumin   Result Value Ref Range    Microalb, Ur 80 mg/L     Creatinine Ur POCT 100 mg/dL     Microalbumin Creatinine Ratio 30 - 300 mg/g        Orders:  -     POCT glycosylated hemoglobin (Hb A1C)  -     POCT microalbumin  -     metFORMIN (GLUCOPHAGE) 500 MG tablet; Take 1 tablet by mouth 2 times daily (with meals) for 7 days, Disp-180 tablet, R-1Adjust Sig  2. Chest congestion  -     azithromycin (ZITHROMAX) 250 MG tablet; Take 1 tablet by mouth See Admin Instructions for 5 days 500mg on day 1 followed by 250mg on days 2 - 5, Disp-6 tablet, R-0Normal  3. Cough  Assessment & Plan:   Uncontrolled, changes made today: Given her history of COPD, persistent symptoms and concerns for COPD exacerbation versus pneumonia will order Z-Bernardo and prednisone. Reviewed side effects of both medications. Will get chest x-ray to rule out pneumonia. Patient voiced understanding about plan of care. Will continue taking over-the-counter medications, increase fluids, rest and take antibiotics to completion  Orders:  -     XR CHEST (2 VW); Future  -     azithromycin (ZITHROMAX) 250 MG tablet; Take 1 tablet by mouth See Admin Instructions for 5 days 500mg on day 1 followed by 250mg on days 2 - 5, Disp-6 tablet, R-0Normal  -     predniSONE (DELTASONE) 10 MG tablet; Take 1 tablet by mouth 2 times daily for 3 days, Disp-6 tablet, R-0Normal  4. Chronic obstructive pulmonary disease with acute lower respiratory infection (HCC)  Assessment & Plan:   Borderline controlled, continue current medications    Orders:  -     predniSONE (DELTASONE) 10 MG tablet; Take 1 tablet by mouth 2 times daily for 3 days, Disp-6 tablet, R-0Normal  5. Screening mammogram for breast cancer  -     IRMA DIGITAL SCREEN W OR WO CAD BILATERAL;  Future       Doreen received counseling on the following healthy behaviors: nutrition, exercise, medication adherence and tobacco cessation  Reviewed prior labs and health maintenance  Continue current medications, diet and exercise. Discussed use, benefit, and side effects of prescribed medications. Barriers to medication compliance addressed. Patient given educational materials - see patient instructions  Was a self-tracking handout given in paper form or via [employer]? Yes    Requested Prescriptions     Signed Prescriptions Disp Refills    metFORMIN (GLUCOPHAGE) 500 MG tablet 180 tablet 1     Sig: Take 1 tablet by mouth 2 times daily (with meals) for 7 days    azithromycin (ZITHROMAX) 250 MG tablet 6 tablet 0     Sig: Take 1 tablet by mouth See Admin Instructions for 5 days 500mg on day 1 followed by 250mg on days 2 - 5    predniSONE (DELTASONE) 10 MG tablet 6 tablet 0     Sig: Take 1 tablet by mouth 2 times daily for 3 days       All patient questions answered. Patient voiced understanding. Quality Measures    Body mass index is 28.87 kg/m². Elevated. Weight control planned discussed Healthy diet and regular exercise. BP: 114/66 Blood pressure is normal. Treatment plan consists of No treatment change needed. Lab Results   Component Value Date    LDLCALC 132 (H) 05/18/2019    LDLCHOLESTEROL 155 (H) 08/04/2016    (goal LDL reduction with dx if diabetes is 50% LDL reduction)      PHQ Scores 6/8/2021   PHQ2 Score 2   PHQ9 Score 2     Interpretation of Total Score Depression Severity: 1-4 = Minimal depression, 5-9 = Mild depression, 10-14 = Moderate depression, 15-19 = Moderately severe depression, 20-27 = Severe depression     Return in about 4 months (around 2/8/2022) for HgbA1C, diabetes.     (Please note that portions of this note were completed with a voice recognition program. Efforts were made to edit the dictations but occasionally words are mis-transcribed.)      Electronically signed by ILA Batista CNP on 10/8/21 at 10:43 AM [place]

## 2021-10-22 ENCOUNTER — NURSE TRIAGE (OUTPATIENT)
Dept: OTHER | Facility: CLINIC | Age: 61
End: 2021-10-22

## 2021-10-22 NOTE — TELEPHONE ENCOUNTER
Received call from Cholo Aguilera at [facility] with [employer]. Brief description of triage: swollen lymph gland    Triage indicates for patient to go to office now. Pt informed if unable to get an appointment to go to urgent care or Emergency Department. Pt agreeable with plan. Care advice provided, patient verbalizes understanding; denies any other questions or concerns; instructed to call back for any new or worsening symptoms. Writer provided warm transfer to [facility] at [facility] for appointment scheduling. Attention Provider: Thank you for allowing me to participate in the care of your patient. The patient was connected to triage in response to information provided to the ECC/PSC. Please do not respond through this encounter as the response is not directed to a shared pool. Reason for Disposition   Single large node and size > 1 inch (2.5 cm)    Answer Assessment - Initial Assessment Questions  1. LOCATION: \"Where is the swollen node located? \" \"Is the matching node on the other side of the body also swollen? \"       Left neck    2. SIZE: \"How big is the node? \" (Inches or centimeters) (or compare to common objects such as pea, bean, marble, golf ball)       Size of quarter, states >1 in. Redness around area. 3. ONSET: \"When did the swelling start?\"       1 weeks    4. NECK NODES: \"Is there a sore throat, runny nose or other symptoms of a cold? \"       Sore throat. No runny nose. No congestions    5. GROIN OR ARMPIT NODES: \"Is there a sore, scratch, cut or painful red area on that arm or leg? \"       N/a    6. FEVER: \"Do you have a fever? \" If so, ask: \"What is it, how was it measured, and when did it start? \"       Denies    7. CAUSE: \"What do you think is causing the swollen lymph nodes? \"      Unsure. No history of same    8. OTHER SYMPTOMS: \"Do you have any other symptoms? \"      No    9. PREGNANCY: \"Is there any chance you are pregnant? \" \"When was your last menstrual period? \"      Hillary Montemayor menopausal    Protocols used: LYMPH NODES - SWOLLEN-ADULT-OH

## 2021-10-24 PROBLEM — R05.9 COUGH: Status: ACTIVE | Noted: 2021-10-24

## 2021-10-24 PROBLEM — R09.89 CHEST CONGESTION: Status: ACTIVE | Noted: 2021-10-24

## 2021-10-25 NOTE — TELEPHONE ENCOUNTER
Nurse triage note says that she was advised to go to the urgent care. That is why we were checking. Does she still have the swollen gland?

## 2021-10-25 NOTE — ASSESSMENT & PLAN NOTE
Borderline controlled, continue current medications and dietary and exercise modifications as previously discussed.  Significant improvement in A1c from 7.3-6.2    Results for orders placed or performed in visit on 10/08/21   POCT glycosylated hemoglobin (Hb A1C)   Result Value Ref Range    Hemoglobin A1C 6.2 %   POCT microalbumin   Result Value Ref Range    Microalb, Ur 80 mg/L     Creatinine Ur POCT 100 mg/dL     Microalbumin Creatinine Ratio 30 - 300 mg/g

## 2021-11-15 ENCOUNTER — OFFICE VISIT (OUTPATIENT)
Dept: FAMILY MEDICINE CLINIC | Age: 61
End: 2021-11-15
Payer: MEDICARE

## 2021-11-15 VITALS
HEART RATE: 79 BPM | DIASTOLIC BLOOD PRESSURE: 70 MMHG | OXYGEN SATURATION: 98 % | TEMPERATURE: 96.8 F | BODY MASS INDEX: 28.98 KG/M2 | WEIGHT: 202.4 LBS | RESPIRATION RATE: 16 BRPM | SYSTOLIC BLOOD PRESSURE: 114 MMHG | HEIGHT: 70 IN

## 2021-11-15 DIAGNOSIS — L08.9 SOFT TISSUE INFECTION: Primary | ICD-10-CM

## 2021-11-15 DIAGNOSIS — E83.52 HYPERCALCEMIA: ICD-10-CM

## 2021-11-15 DIAGNOSIS — E21.3 HYPERPARATHYROIDISM (HCC): ICD-10-CM

## 2021-11-15 DIAGNOSIS — R59.0 LYMPHADENOPATHY OF LEFT CERVICAL REGION: ICD-10-CM

## 2021-11-15 DIAGNOSIS — E78.2 MIXED HYPERLIPIDEMIA: ICD-10-CM

## 2021-11-15 PROCEDURE — 99213 OFFICE O/P EST LOW 20 MIN: CPT | Performed by: NURSE PRACTITIONER

## 2021-11-15 RX ORDER — DOXYCYCLINE HYCLATE 100 MG
100 TABLET ORAL 2 TIMES DAILY
Qty: 14 TABLET | Refills: 0 | Status: SHIPPED | OUTPATIENT
Start: 2021-11-15 | End: 2021-11-22

## 2021-11-15 SDOH — ECONOMIC STABILITY: FOOD INSECURITY: WITHIN THE PAST 12 MONTHS, THE FOOD YOU BOUGHT JUST DIDN'T LAST AND YOU DIDN'T HAVE MONEY TO GET MORE.: NEVER TRUE

## 2021-11-15 SDOH — ECONOMIC STABILITY: FOOD INSECURITY: WITHIN THE PAST 12 MONTHS, YOU WORRIED THAT YOUR FOOD WOULD RUN OUT BEFORE YOU GOT MONEY TO BUY MORE.: NEVER TRUE

## 2021-11-15 ASSESSMENT — PATIENT HEALTH QUESTIONNAIRE - PHQ9
SUM OF ALL RESPONSES TO PHQ9 QUESTIONS 1 & 2: 0
SUM OF ALL RESPONSES TO PHQ QUESTIONS 1-9: 0
2. FEELING DOWN, DEPRESSED OR HOPELESS: 0
SUM OF ALL RESPONSES TO PHQ QUESTIONS 1-9: 0
1. LITTLE INTEREST OR PLEASURE IN DOING THINGS: 0
SUM OF ALL RESPONSES TO PHQ QUESTIONS 1-9: 0

## 2021-11-15 ASSESSMENT — ENCOUNTER SYMPTOMS
RESPIRATORY NEGATIVE: 1
GASTROINTESTINAL NEGATIVE: 1
EYES NEGATIVE: 1
COUGH: 0
SHORTNESS OF BREATH: 0

## 2021-11-15 ASSESSMENT — SOCIAL DETERMINANTS OF HEALTH (SDOH): HOW HARD IS IT FOR YOU TO PAY FOR THE VERY BASICS LIKE FOOD, HOUSING, MEDICAL CARE, AND HEATING?: NOT HARD AT ALL

## 2021-11-15 NOTE — PROGRESS NOTES
Depression screening done  Financial Resource Strain done  Chief Complaint   Patient presents with    Diabetes    Cyst     spot on her neck, 3 weeks ago [facility])      Vaccine Information Sheet, \"Influenza - Inactivated\"  given to Lluvia Norwood, or parent/legal guardian of  Lluvia Norwood and verbalized understanding. Patient responses:    Have you ever had a reaction to a flu vaccine? No  Are you able to eat eggs without adverse effects? No  Do you have any current illness? No  Have you ever had [place] Syndrome? No    Flu vaccine given per order. Please see immunization tab.

## 2021-11-15 NOTE — PROGRESS NOTES
[facility]  [facility]  [address]  [place] [address]  Dept: [phone]    11/15/2021    CHIEF COMPLAINT    Chief Complaint   Patient presents with    Cyst     spot on her neck, 3 weeks ago [facility])        SHEREE Foster is a 62 y.o. female who presents   Chief Complaint   Patient presents with    Cyst     spot on her neck, 3 weeks ago [facility])    . Appointment for f/u on cyst on the left side of her neck. Left neck cyst- she notes that her left side of her neck was swollen to the size of a quarter, was purple in color and painful. She did have some temperatures at the beginning and did use warm compresses without any exudate or opening. Abx was prescribed by urgent care. No testing or US. She took Doxycycline BID x 10 days. The cyst has gone down in size, but is still painful and still raised. She denies numbness and tingling today, but reports when it was bigger she had numbness and tingling under her left side of her jaw and above her left collar bone. Vitals:    11/15/21 1305   BP: 114/70   Site: Left Upper Arm   Position: Sitting   Cuff Size: Large Adult   Pulse: 79   Resp: 16   Temp: 96.8 °F (36 °C)   TempSrc: Temporal   SpO2: 98%   Weight: 202 lb 6.4 oz (91.8 kg)   Height: 5' 10\" (1.778 m)       Wt Readings from Last 3 Encounters:   11/15/21 202 lb 6.4 oz (91.8 kg)   10/08/21 201 lb 3.2 oz (91.3 kg)   06/08/21 202 lb 3.2 oz (91.7 kg)     BP Readings from Last 3 Encounters:   11/15/21 114/70   10/08/21 114/66   06/08/21 110/60       REVIEW OF SYSTEMS    Review of Systems   Constitutional: Negative. Negative for chills, fatigue and fever. Eyes: Negative. Negative for visual disturbance. Respiratory: Negative. Negative for cough and shortness of breath. Cardiovascular: Negative. Negative for chest pain and palpitations. Gastrointestinal: Negative.     Musculoskeletal: Positive for myalgias (left upper arm with infection. This has resolved. ). Negative for joint swelling. Skin: Positive for wound. Negative for rash. Neurological: Positive for headaches. Negative for dizziness. Hematological: Positive for adenopathy (initially and some continued swelling her cyst). PAST MEDICAL HISTORY    Past Medical History:   Diagnosis Date    Adenoma of right adrenal gland     Stable 4/2019. No repeat imaging advised.  COPD [facility]chronic obstructive pulmonary disease) ([facility] [address]     Depression     Pneumonia 2017 & 2019    twice     Pre-diabetes     Type 2 diabetes mellitus ([facility] [address]        FAMILY HISTORY    Family History   Problem Relation Age of Onset    Heart Disease Mother     Diabetes Mother     Heart Attack Mother 36        COD, she had 3 heart attacks     Heart Disease Father     Diabetes Father     Heart Attack Father 50        COD     Diabetes Sister     Hypertension Sister     Atrial Fibrillation Sister     Diabetes type 2  Maternal Grandmother         COD    Heart Disease Maternal Grandfather         COD    Diabetes type 2  Paternal Grandmother     Heart Disease Paternal Grandmother     Diabetes type 2  Paternal Grandfather     Heart Disease Paternal Grandfather     Stroke Brother         COD     Alcohol Abuse Brother     Hypertension Brother     ADHD Brother     Heart Attack Brother 36        several     Heart Attack Paternal Uncle     Pancreatic Cancer Paternal Uncle     Pancreatic Cancer Paternal Uncle        SOCIAL HISTORY    Social History     Socioeconomic History    Marital status:       Spouse name: None    Number of children: None    Years of education: None    Highest education level: None   Occupational History    None   Tobacco Use    Smoking status: Current Every Day Smoker     Packs/day: 0.50     Years: 35.00     Pack years: 17.50     Types: Cigarettes     Start date: 9/30/1980    Smokeless tobacco: Never Used   Vaping Use    Vaping Use: Never used   Substance and Sexual Activity    Alcohol use: No    Drug use: No    Sexual activity: None   Other Topics Concern    None   Social History Narrative    None     Social Determinants of Health     Financial Resource Strain: Low Risk     Difficulty of Paying Living Expenses: Not hard at all   Food Insecurity: No Food Insecurity    Worried About Running Out of Food in the Last Year: Never true    [place] of Food in the Last Year: Never true   Transportation Needs: No Transportation Needs    Lack of Transportation (Medical): No    Lack of Transportation (Non-Medical):  No   Physical Activity:     Days of Exercise per Week: Not on file    Minutes of Exercise per Session: Not on file   Stress:     Feeling of Stress : Not on file   Social Connections:     Frequency of Communication with Friends and Family: Not on file    Frequency of Social Gatherings with Friends and Family: Not on file    Attends Mandaen Services: Not on file    Active Member of [address] [place] [address] or Organizations: Not on file    Attends Club or Organization Meetings: Not on file    Marital Status: Not on file   Intimate Partner Violence:     Fear of Current or Ex-Partner: Not on file    Emotionally Abused: Not on file    Physically Abused: Not on file    Sexually Abused: Not on file   Housing Stability:     Unable to Pay for Housing in the Last Year: Not on file    Number of [place] in the Last Year: Not on file    Unstable Housing in the Last Year: Not on file       SURGICAL HISTORY    Past Surgical History:   Procedure Laterality Date    CATARACT REMOVAL Bilateral    [address] [address] EXTRACTION  2019       CURRENT MEDICATIONS    Current Outpatient Medications   Medication Sig Dispense Refill    doxycycline hyclate (VIBRA-TABS) 100 MG tablet Take 1 tablet by mouth 2 times daily for 7 days 14 tablet 0    buPROPion (WELLBUTRIN XL) 300 MG extended release tablet Take 300 mg by mouth every morning  albuterol (PROVENTIL) (2.5 MG/3ML) 0.083% nebulizer solution Take 3 mLs by nebulization every 6 hours as needed for Wheezing or Shortness of Breath 120 each 5    budesonide-formoterol (SYMBICORT) 160-4.5 MCG/ACT AERO Inhale 2 puffs into the lungs 2 times daily 1 Inhaler 0    lactobacillus (CULTURELLE) CAPS capsule Take 1 capsule by mouth daily 30 capsule 5    Handicap Placard MISC by Does not apply route Expires in 5 years ( 2025 ) 1 each 0    FLUoxetine (PROZAC) 40 MG capsule Take 2 capsules by mouth daily       aspirin 81 MG tablet Take 81 mg by mouth daily      metFORMIN (GLUCOPHAGE) 500 MG tablet Take 1 tablet by mouth 2 times daily (with meals) for 7 days 180 tablet 1    atorvastatin (LIPITOR) 10 MG tablet TAKE ONE TABLET BY MOUTH ONCE NIGHTLY (Patient not taking: Reported on 11/15/2021) 90 tablet 0    glucose monitoring kit (FREESTYLE) monitoring kit 1 kit by Does not apply route daily (Patient not taking: Reported on 11/15/2021) 1 kit 0    Lancets MISC 1 each by Does not apply route 2 times daily (Patient not taking: Reported on 11/15/2021) 100 each 11    blood glucose monitor strips Test 2 times a day & as needed for symptoms of irregular blood glucose. (Patient not taking: Reported on 11/15/2021) 100 strip 11     No current facility-administered medications for this visit. ALLERGIES    Allergies   Allergen Reactions    Cephalosporins Anaphylaxis    Persantine [Dipyridamole] Hives    Ceftin [Cefuroxime Axetil]      Pt unable to recall type of reaction she had from ceftin    Penicillins        PHYSICAL EXAM   Physical Exam  Vitals and nursing note reviewed. Constitutional:       General: She is not in acute distress. Appearance: She is well-developed. She is not diaphoretic. Interventions: Face mask in place. HENT:      Head: Normocephalic. Right Ear: Hearing normal.      Left Ear: Hearing normal.   Eyes:      General:         Right eye: No discharge.          Left eye: No discharge. Pupils: Pupils are equal.   Cardiovascular:      Rate and Rhythm: Normal rate and regular rhythm. Pulses: Normal pulses. Radial pulses are 2+ on the right side and 2+ on the left side. Heart sounds: Normal heart sounds, S1 normal and S2 normal. No murmur heard. Pulmonary:      Effort: Pulmonary effort is normal. No respiratory distress. Breath sounds: Normal breath sounds. No wheezing. Musculoskeletal:      Cervical back: Normal range of motion. Lymphadenopathy:      Cervical: Cervical adenopathy present. Right cervical: Superficial cervical adenopathy present. Skin:     General: Skin is warm and dry. Neurological:      Mental Status: She is alert and oriented to person, place, and time. Psychiatric:         Behavior: Behavior normal. Behavior is cooperative. ASSESSMENT/PLAN  1. Soft tissue infection  Assessment & Plan:   Uncontrolled, changes made today: Given the continued swelling and discomfort will extend course of antibiotics and reorder doxycycline for an additional 7 days. Patient advised to notify office if symptoms worsen or fail to improve  Orders:  -     doxycycline hyclate (VIBRA-TABS) 100 MG tablet; Take 1 tablet by mouth 2 times daily for 7 days, Disp-14 tablet, R-0Normal  2. Lymphadenopathy of left cervical region  Assessment & Plan:   Uncontrolled, changes made today: Will order ultrasound of head and neck. Advised patient to have this performed if lymphadenopathy does not improve after soft tissue infection resolves. Discussed that this may take a couple of weeks after the antibiotic is completed     Patient voiced understanding of plan of care  Orders:  -     US HEAD NECK SOFT TISSUE THYROID; Future  -     CBC Auto Differential; Future  3. Mixed hyperlipidemia  Assessment & Plan:   Unclear control, continue current plan pending work up below. Patient not currently taking Lipitor 10 mg.   We will recheck labs and resume medication if necessary. Orders:  -     Lipid Panel; Future  -     Comprehensive Metabolic Panel; Future  4. Hypercalcemia  Assessment & Plan:   Asymptomatic, continue current plan pending work up below. Patient has been referred to endocrine on multiple occasions, but has not been able to get in. We will recheck labs and potentially refer to endocrine again if appropriate. Patient was advised to get labs drawn at the hospital due to ionized calcium. Orders:  -     Comprehensive Metabolic Panel; Future  -     PTH, Intact With Ionized Calcium; Future  5. Hyperparathyroidism [facility])  Assessment & Plan:   Asymptomatic, continue current plan pending work up below. Patient has been referred to endocrine on multiple occasions, but has not been able to get in. We will recheck labs and potentially refer to endocrine again if appropriate. Patient was advised to get labs drawn at the hospital due to ionized calcium. Orders:  -     Comprehensive Metabolic Panel; Future  -     PTH, Intact With Ionized Calcium; Future      Doreen received counseling on the following healthy behaviors: nutrition, exercise and medication adherence  Reviewed prior labs and health maintenance. Continue current medications, diet and exercise. Discussed use, benefit, and side effects of prescribed medications. Barriers to medication compliance addressed. Patient given educational materials - see patient instructions. All patient questions answered. Patient voiced understanding. Return if symptoms worsen or fail to improve, for Keep appointment in March.     (Please note that portions of this note were completed with a voice recognition program. Efforts were made to edit the dictations but occasionally words are mis-transcribed.)      Electronically signed by ILA Carter CNP on 11/15/21 at 1:14 PM EST

## 2021-11-21 PROBLEM — R59.0 LYMPHADENOPATHY OF LEFT CERVICAL REGION: Status: ACTIVE | Noted: 2021-11-21

## 2021-11-21 PROBLEM — E21.3 HYPERPARATHYROIDISM (HCC): Status: ACTIVE | Noted: 2021-11-21

## 2021-11-21 PROBLEM — L08.9 SOFT TISSUE INFECTION: Status: ACTIVE | Noted: 2021-11-21

## 2021-11-21 NOTE — ASSESSMENT & PLAN NOTE
Uncontrolled, changes made today: Given the continued swelling and discomfort will extend course of antibiotics and reorder doxycycline for an additional 7 days.   Patient advised to notify office if symptoms worsen or fail to improve

## 2021-11-21 NOTE — ASSESSMENT & PLAN NOTE
Asymptomatic, continue current plan pending work up below. Patient has been referred to endocrine on multiple occasions, but has not been able to get in. We will recheck labs and potentially refer to endocrine again if appropriate. Patient was advised to get labs drawn at the hospital due to ionized calcium.

## 2021-11-21 NOTE — ASSESSMENT & PLAN NOTE
Unclear control, continue current plan pending work up below. Patient not currently taking Lipitor 10 mg. We will recheck labs and resume medication if necessary.

## 2021-11-23 PROBLEM — R05.9 COUGH: Status: RESOLVED | Noted: 2021-10-24 | Resolved: 2021-11-23

## 2021-12-01 ENCOUNTER — HOSPITAL ENCOUNTER (OUTPATIENT)
Dept: ULTRASOUND IMAGING | Age: 61
Discharge: HOME | End: 2021-12-03
Payer: MEDICARE

## 2021-12-01 ENCOUNTER — HOSPITAL ENCOUNTER (OUTPATIENT)
Dept: MAMMOGRAPHY | Age: 61
Discharge: HOME | End: 2021-12-03
Payer: MEDICARE

## 2021-12-01 ENCOUNTER — HOSPITAL ENCOUNTER (OUTPATIENT)
Age: 61
Discharge: HOME | End: 2021-12-01
Payer: MEDICARE

## 2021-12-01 DIAGNOSIS — E21.3 HYPERPARATHYROIDISM (HCC): ICD-10-CM

## 2021-12-01 DIAGNOSIS — R59.0 LYMPHADENOPATHY OF LEFT CERVICAL REGION: ICD-10-CM

## 2021-12-01 DIAGNOSIS — E83.52 HYPERCALCEMIA: ICD-10-CM

## 2021-12-01 DIAGNOSIS — Z12.31 SCREENING MAMMOGRAM FOR BREAST CANCER: ICD-10-CM

## 2021-12-01 DIAGNOSIS — E78.2 MIXED HYPERLIPIDEMIA: ICD-10-CM

## 2021-12-01 LAB
ABSOLUTE EOS #: 0.05 K/UL (ref 0–0.44)
ABSOLUTE IMMATURE GRANULOCYTE: <0.03 K/UL (ref 0–0.3)
ABSOLUTE LYMPH #: 1.37 K/UL (ref 1.1–3.7)
ABSOLUTE MONO #: 0.31 K/UL (ref 0.1–1.2)
ALBUMIN SERPL-MCNC: 4.6 G/DL (ref 3.5–5.2)
ALBUMIN/GLOBULIN RATIO: 2.2 (ref 1–2.5)
ALP BLD-CCNC: 103 U/L (ref 35–104)
ALT SERPL-CCNC: 31 U/L (ref 5–33)
ANION GAP SERPL CALCULATED.3IONS-SCNC: 11 MMOL/L (ref 9–17)
AST SERPL-CCNC: 27 U/L
BASOPHILS # BLD: 1 % (ref 0–2)
BASOPHILS ABSOLUTE: 0.06 K/UL (ref 0–0.2)
BILIRUB SERPL-MCNC: 0.28 MG/DL (ref 0.3–1.2)
BUN BLDV-MCNC: 7 MG/DL (ref 8–23)
BUN/CREAT BLD: ABNORMAL (ref 9–20)
CALCIUM IONIZED: 1.46 MMOL/L (ref 1.13–1.33)
CALCIUM SERPL-MCNC: 10.6 MG/DL (ref 8.6–10.4)
CHLORIDE BLD-SCNC: 105 MMOL/L (ref 98–107)
CHOLESTEROL/HDL RATIO: 5
CHOLESTEROL: 213 MG/DL
CO2: 23 MMOL/L (ref 20–31)
CREAT SERPL-MCNC: 0.61 MG/DL (ref 0.5–0.9)
DIFFERENTIAL TYPE: NORMAL
EOSINOPHILS RELATIVE PERCENT: 1 % (ref 1–4)
GFR AFRICAN AMERICAN: >60 ML/MIN
GFR NON-AFRICAN AMERICAN: >60 ML/MIN
GFR SERPL CREATININE-BSD FRML MDRD: ABNORMAL ML/MIN/{1.73_M2}
GFR SERPL CREATININE-BSD FRML MDRD: ABNORMAL ML/MIN/{1.73_M2}
GLUCOSE BLD-MCNC: 118 MG/DL (ref 70–99)
HCT VFR BLD CALC: 40.3 % (ref 36.3–47.1)
HDLC SERPL-MCNC: 43 MG/DL
HEMOGLOBIN: 13.2 G/DL (ref 11.9–15.1)
IMMATURE GRANULOCYTES: 0 %
LDL CHOLESTEROL: 122 MG/DL (ref 0–130)
LYMPHOCYTES # BLD: 27 % (ref 24–43)
MCH RBC QN AUTO: 32.2 PG (ref 25.2–33.5)
MCHC RBC AUTO-ENTMCNC: 32.8 G/DL (ref 28.4–34.8)
MCV RBC AUTO: 98.3 FL (ref 82.6–102.9)
MONOCYTES # BLD: 6 % (ref 3–12)
NRBC AUTOMATED: 0 PER 100 WBC
PDW BLD-RTO: 12.1 % (ref 11.8–14.4)
PLATELET # BLD: 210 K/UL (ref 138–453)
PLATELET ESTIMATE: NORMAL
PMV BLD AUTO: 11.6 FL (ref 8.1–13.5)
POTASSIUM SERPL-SCNC: 4.3 MMOL/L (ref 3.7–5.3)
PTH INTACT: 119.8 PG/ML (ref 15–65)
RBC # BLD: 4.1 M/UL (ref 3.95–5.11)
RBC # BLD: NORMAL 10*6/UL
SEG NEUTROPHILS: 65 % (ref 36–65)
SEGMENTED NEUTROPHILS ABSOLUTE COUNT: 3.21 K/UL (ref 1.5–8.1)
SODIUM BLD-SCNC: 139 MMOL/L (ref 135–144)
TOTAL PROTEIN: 6.7 G/DL (ref 6.4–8.3)
TRIGL SERPL-MCNC: 238 MG/DL
VLDLC SERPL CALC-MCNC: ABNORMAL MG/DL (ref 1–30)
WBC # BLD: 5 K/UL (ref 3.5–11.3)
WBC # BLD: NORMAL 10*3/UL

## 2021-12-01 PROCEDURE — 85025 COMPLETE CBC W/AUTO DIFF WBC: CPT

## 2021-12-01 PROCEDURE — 77063 BREAST TOMOSYNTHESIS BI: CPT

## 2021-12-01 PROCEDURE — 76536 US EXAM OF HEAD AND NECK: CPT

## 2021-12-01 PROCEDURE — 83970 ASSAY OF PARATHORMONE: CPT

## 2021-12-01 PROCEDURE — 80061 LIPID PANEL: CPT

## 2021-12-01 PROCEDURE — 82330 ASSAY OF CALCIUM: CPT

## 2021-12-01 PROCEDURE — 80053 COMPREHEN METABOLIC PANEL: CPT

## 2021-12-01 PROCEDURE — 36415 COLL VENOUS BLD VENIPUNCTURE: CPT

## 2021-12-09 ENCOUNTER — TELEPHONE (OUTPATIENT)
Dept: FAMILY MEDICINE CLINIC | Age: 61
End: 2021-12-09

## 2021-12-09 DIAGNOSIS — E83.52 HYPERCALCEMIA: ICD-10-CM

## 2021-12-09 DIAGNOSIS — E21.3 HYPERPARATHYROIDISM (HCC): Primary | ICD-10-CM

## 2021-12-09 NOTE — TELEPHONE ENCOUNTER
----- Message from Reymundo Kennedy sent at 12/9/2021 11:52 AM EST -----  Subject: Results Request    QUESTIONS  Which lab or imaging result is the patient calling about? Labs, Mammogram,   US  Which provider ordered the test? Candi Young   At what location was the test performed? Date the test was performed? 2021-12-01  Additional Information for Provider? Pt looking for results of Labs, [place] performed 12/01/2021  ---------------------------------------------------------------------------  --------------  CALL BACK INFO  What is the best way for the office to contact you? OK to leave message on   voicemail  Preferred Call Back Phone Number?  [phone]

## 2021-12-09 NOTE — TELEPHONE ENCOUNTER
A letter is in the mail with her results, but please inform the patient that her mammogram is normal.      Her ultrasound of her neck showed a right lobe thyroid nodule that is small and not concerning. There is no recommendation for doing a repeat ultrasound. Otherwise they saw small benign lymph nodes. Her lab work continues to show abnormal parathyroid hormone and calcium levels that should be evaluated by an endocrinologist.  If she is ready we can try to refer her to a different endocrinologist.  Her metabolic panel shows an elevated calcium level due to the parathyroid hormone dysfunction and that she is dehydrated, but is otherwise normal.  Blood counts are normal.    Lastly, her total cholesterol, triglycerides and cholesterol/HDL ratio are all elevated. I would suggest that she resume her Lipitor.

## 2021-12-16 NOTE — TELEPHONE ENCOUNTER
I would like to refer her to Dr. Merly Kang through [facility]. Please fax the referral to [phone]. Please also call patient and let her know that she can schedule at [phone].

## 2021-12-16 NOTE — TELEPHONE ENCOUNTER
Patient was given her results and she'd like a referral  For Endocrine.   She was never able to get in with [facility] or Dr. Minna Webb

## 2022-01-12 DIAGNOSIS — E11.65 TYPE 2 DIABETES MELLITUS WITH HYPERGLYCEMIA, WITHOUT LONG-TERM CURRENT USE OF INSULIN (HCC): ICD-10-CM

## 2022-03-01 ENCOUNTER — HOSPITAL ENCOUNTER (OUTPATIENT)
Dept: ULTRASOUND IMAGING | Age: 62
Discharge: HOME | End: 2022-03-03
Payer: MEDICARE

## 2022-03-01 ENCOUNTER — HOSPITAL ENCOUNTER (OUTPATIENT)
Age: 62
Discharge: HOME | End: 2022-03-01
Payer: MEDICARE

## 2022-03-01 ENCOUNTER — HOSPITAL ENCOUNTER (OUTPATIENT)
Dept: MAMMOGRAPHY | Age: 62
Discharge: HOME | End: 2022-03-03
Payer: MEDICARE

## 2022-03-01 DIAGNOSIS — E21.3 HYPERPARATHYROIDISM, UNSPECIFIED (HCC): ICD-10-CM

## 2022-03-01 DIAGNOSIS — E83.52 HYPERCALCEMIA: ICD-10-CM

## 2022-03-01 PROCEDURE — 77080 DXA BONE DENSITY AXIAL: CPT

## 2022-03-01 PROCEDURE — 76770 US EXAM ABDO BACK WALL COMP: CPT

## 2022-03-03 ENCOUNTER — HOSPITAL ENCOUNTER (OUTPATIENT)
Age: 62
Discharge: HOME | End: 2022-03-03
Payer: MEDICARE

## 2022-03-03 LAB — VITAMIN D 25-HYDROXY: 16.1 NG/ML

## 2022-03-03 PROCEDURE — 82570 ASSAY OF URINE CREATININE: CPT

## 2022-03-03 PROCEDURE — 82340 ASSAY OF CALCIUM IN URINE: CPT

## 2022-03-03 PROCEDURE — 82306 VITAMIN D 25 HYDROXY: CPT

## 2022-03-03 PROCEDURE — 36415 COLL VENOUS BLD VENIPUNCTURE: CPT

## 2022-03-03 PROCEDURE — 81050 URINALYSIS VOLUME MEASURE: CPT

## 2022-03-04 LAB
CALCIUM URINE: 12.1 MG/DL
CALCIUM, URINE: 150 MG/24 H (ref 20–275)
CREATININE URINE: 74 MG/DL
CREATININE, 24H UR: 917 MG/24 H (ref 740–1570)
HOURS COLLECTED: 24 H
HOURS COLLECTED: 24 H
VOLUME: 1239 ML
VOLUME: 1239 ML

## 2022-03-15 ENCOUNTER — OFFICE VISIT (OUTPATIENT)
Dept: FAMILY MEDICINE CLINIC | Age: 62
End: 2022-03-15
Payer: MEDICARE

## 2022-03-15 VITALS
HEART RATE: 81 BPM | SYSTOLIC BLOOD PRESSURE: 119 MMHG | DIASTOLIC BLOOD PRESSURE: 62 MMHG | OXYGEN SATURATION: 97 % | TEMPERATURE: 97.8 F

## 2022-03-15 DIAGNOSIS — R06.02 SOB (SHORTNESS OF BREATH): ICD-10-CM

## 2022-03-15 DIAGNOSIS — J44.0 CHRONIC OBSTRUCTIVE PULMONARY DISEASE WITH ACUTE LOWER RESPIRATORY INFECTION (HCC): ICD-10-CM

## 2022-03-15 DIAGNOSIS — E78.2 MIXED HYPERLIPIDEMIA: ICD-10-CM

## 2022-03-15 DIAGNOSIS — Z53.20 CERVICAL CANCER SCREENING DECLINED: ICD-10-CM

## 2022-03-15 DIAGNOSIS — E21.3 HYPERPARATHYROIDISM (HCC): ICD-10-CM

## 2022-03-15 DIAGNOSIS — E11.65 TYPE 2 DIABETES MELLITUS WITH HYPERGLYCEMIA, WITHOUT LONG-TERM CURRENT USE OF INSULIN (HCC): Primary | ICD-10-CM

## 2022-03-15 LAB — HBA1C MFR BLD: 6.7 %

## 2022-03-15 PROCEDURE — 99214 OFFICE O/P EST MOD 30 MIN: CPT | Performed by: NURSE PRACTITIONER

## 2022-03-15 PROCEDURE — 83036 HEMOGLOBIN GLYCOSYLATED A1C: CPT | Performed by: NURSE PRACTITIONER

## 2022-03-15 PROCEDURE — 3044F HG A1C LEVEL LT 7.0%: CPT | Performed by: NURSE PRACTITIONER

## 2022-03-15 RX ORDER — ALBUTEROL SULFATE 90 UG/1
2 AEROSOL, METERED RESPIRATORY (INHALATION) EVERY 6 HOURS PRN
Qty: 3 EACH | Refills: 1 | Status: SHIPPED | OUTPATIENT
Start: 2022-03-15

## 2022-03-15 RX ORDER — ATORVASTATIN CALCIUM 10 MG/1
TABLET, FILM COATED ORAL
Qty: 90 TABLET | Refills: 0 | Status: SHIPPED | OUTPATIENT
Start: 2022-03-15

## 2022-03-15 RX ORDER — BUDESONIDE AND FORMOTEROL FUMARATE DIHYDRATE 80; 4.5 UG/1; UG/1
2 AEROSOL RESPIRATORY (INHALATION) 2 TIMES DAILY
Qty: 30.6 G | Refills: 1 | Status: SHIPPED | OUTPATIENT
Start: 2022-03-15 | End: 2022-09-19 | Stop reason: ALTCHOICE

## 2022-03-15 RX ORDER — ATORVASTATIN CALCIUM 10 MG/1
TABLET, FILM COATED ORAL
Qty: 90 TABLET | Refills: 0 | Status: CANCELLED | OUTPATIENT
Start: 2022-03-15

## 2022-03-15 ASSESSMENT — PATIENT HEALTH QUESTIONNAIRE - PHQ9
SUM OF ALL RESPONSES TO PHQ QUESTIONS 1-9: 7
SUM OF ALL RESPONSES TO PHQ QUESTIONS 1-9: 7
1. LITTLE INTEREST OR PLEASURE IN DOING THINGS: 0
4. FEELING TIRED OR HAVING LITTLE ENERGY: 2
9. THOUGHTS THAT YOU WOULD BE BETTER OFF DEAD, OR OF HURTING YOURSELF: 0
7. TROUBLE CONCENTRATING ON THINGS, SUCH AS READING THE NEWSPAPER OR WATCHING TELEVISION: 2
6. FEELING BAD ABOUT YOURSELF - OR THAT YOU ARE A FAILURE OR HAVE LET YOURSELF OR YOUR FAMILY DOWN: 0
5. POOR APPETITE OR OVEREATING: 0
10. IF YOU CHECKED OFF ANY PROBLEMS, HOW DIFFICULT HAVE THESE PROBLEMS MADE IT FOR YOU TO DO YOUR WORK, TAKE CARE OF THINGS AT HOME, OR GET ALONG WITH OTHER PEOPLE: 0
SUM OF ALL RESPONSES TO PHQ QUESTIONS 1-9: 7
8. MOVING OR SPEAKING SO SLOWLY THAT OTHER PEOPLE COULD HAVE NOTICED. OR THE OPPOSITE, BEING SO FIGETY OR RESTLESS THAT YOU HAVE BEEN MOVING AROUND A LOT MORE THAN USUAL: 0
SUM OF ALL RESPONSES TO PHQ9 QUESTIONS 1 & 2: 1
SUM OF ALL RESPONSES TO PHQ QUESTIONS 1-9: 7
2. FEELING DOWN, DEPRESSED OR HOPELESS: 1
3. TROUBLE FALLING OR STAYING ASLEEP: 2

## 2022-03-15 NOTE — PROGRESS NOTES
[address] [place] [address] [facility] [address]  Dept: [phone]    3/15/2022    CHIEF COMPLAINT    Chief Complaint   Patient presents with    Diabetes     4 month f/u       HPI    Ignacia Bell is a 62 y.o. female who presents   Chief Complaint   Patient presents with    Diabetes     4 month f/u   . Appointment to f/u on multiple medical conditions. Diabetes-continues taking Metformin 500 mg BID  She continues working on dietary modifications and increased physical activity as able. Not checking BG regularly. No DM eye exam not scheduled yet. She plans to return    Lab Results       Component                Value               Date                       LABA1C                   6.2                 10/08/2021                 LABA1C                   7.3                 06/08/2021                 LABA1C                   6.0                 10/23/2019            Lab Results       Component                Value               Date                       EAG                      128                 03/29/2019               Parathyroidism-patient has established with Dr. Erick Mccoy with [facility]. Established with his office March 1st.   DEXA scan and kidney US was completed through [facility] also. She has a f/u with their office later this month. COPD-using Albuterol nebulized solution every morning and every evening. She has not  had a rescue HFA. She hasn't had rx's of the Symbicort due to cost, but has not attempted to fill medication this new year. Will send it to pharmacy at lower dose as itis tier 1. Hyperlipidemia-currently taking Lipitor 10 mg nightly without side effects. Diabetes  She presents for her follow-up diabetic visit. She has type 2 diabetes mellitus. There are no hypoglycemic associated symptoms. Pertinent negatives for hypoglycemia include no dizziness or headaches. Associated symptoms include fatigue.  Pertinent negatives for diabetes include no chest pain. There are no hypoglycemic complications. Symptoms are stable. There are no diabetic complications. Risk factors for coronary artery disease include tobacco exposure, stress, post-menopausal and sedentary lifestyle. Current diabetic treatment includes oral agent (monotherapy). She does not see a podiatrist.Eye exam is not current. Vitals:    03/15/22 1303   BP: 119/62   Pulse: 81   Temp: 97.8 °F (36.6 °C)   SpO2: 97%       Wt Readings from Last 3 Encounters:   11/15/21 202 lb 6.4 oz (91.8 kg)   10/08/21 201 lb 3.2 oz (91.3 kg)   06/08/21 202 lb 3.2 oz (91.7 kg)     BP Readings from Last 3 Encounters:   03/15/22 119/62   11/15/21 114/70   10/08/21 114/66       REVIEW OF SYSTEMS    Review of Systems   Constitutional: Positive for fatigue. Negative for chills and fever. HENT: Negative for sore throat. Eyes: Negative. Negative for visual disturbance. Respiratory: Positive for shortness of breath (at baseline). Negative for cough. Cardiovascular: Negative. Negative for chest pain and palpitations (No recurreance in several months ). Gastrointestinal: Negative. Negative for abdominal pain, constipation, diarrhea, nausea and vomiting. Genitourinary: Negative. Negative for difficulty urinating. Musculoskeletal: Negative. Negative for myalgias. Neurological: Negative. Negative for dizziness and headaches. PAST MEDICAL HISTORY    Past Medical History:   Diagnosis Date    Adenoma of right adrenal gland     Stable 4/2019. No repeat imaging advised.      COPD (chronic obstructive pulmonary disease) (HCC)     Depression     Lymphadenopathy of left cervical region 11/21/2021    Pilonidal cyst with abscess 2/25/2021    Pneumonia 2017 & 2019    twice     Pre-diabetes     Type 2 diabetes mellitus ([address]        FAMILY HISTORY    Family History   Problem Relation Age of Onset    Heart Disease Mother     Diabetes Mother     Heart Attack Mother 36 COD, she had 3 heart attacks     Heart Disease Father     Diabetes Father     Heart Attack Father 50        COD     Diabetes Sister     Hypertension Sister     Atrial Fibrillation Sister     Diabetes type 2  Maternal Grandmother         COD    Heart Disease Maternal Grandfather         COD    Diabetes type 2  Paternal Grandmother     Heart Disease Paternal Grandmother     Diabetes type 2  Paternal Grandfather     Heart Disease Paternal Grandfather     Stroke Brother         COD     Alcohol Abuse Brother     Hypertension Brother     ADHD Brother     Heart Attack Brother 36        several     Heart Attack Paternal Uncle     Pancreatic Cancer Paternal Uncle     Pancreatic Cancer Paternal Uncle        SOCIAL HISTORY    Social History     Socioeconomic History    Marital status:      Spouse name: None    Number of children: None    Years of education: None    Highest education level: None   Occupational History    None   Tobacco Use    Smoking status: Current Every Day Smoker     Packs/day: 0.50     Years: 35.00     Pack years: 17.50     Types: Cigarettes     Start date: 9/30/1980    Smokeless tobacco: Never Used   Vaping Use    Vaping Use: Never used   Substance and Sexual Activity    Alcohol use: No    Drug use: No    Sexual activity: None   Other Topics Concern    None   Social History Narrative    None     Social Determinants of Health     Financial Resource Strain: Low Risk     Difficulty of Paying Living Expenses: Not hard at all   Food Insecurity: No Food Insecurity    Worried About Running Out of Food in the Last Year: Never true    [place] of Food in the Last Year: Never true   Transportation Needs: No Transportation Needs    Lack of Transportation (Medical): No    Lack of Transportation (Non-Medical):  No   Physical Activity:     Days of Exercise per Week: Not on file    Minutes of Exercise per Session: Not on file   Stress:     Feeling of Stress : Not on file   Social Connections:     Frequency of Communication with Friends and Family: Not on file    Frequency of Social Gatherings with Friends and Family: Not on file    Attends [employer] Services: Not on file    Active Member of [address] [place] [address] or Organizations: Not on file    Attends Club or Organization Meetings: Not on file    Marital Status: Not on file   Intimate Partner Violence:     Fear of Current or Ex-Partner: Not on file    Emotionally Abused: Not on file    Physically Abused: Not on file    Sexually Abused: Not on file   Housing Stability:     Unable to Pay for Housing in the Last Year: Not on file    Number of [place] in the Last Year: Not on file    Unstable Housing in the Last Year: Not on file       SURGICAL HISTORY    Past Surgical History:   Procedure Laterality Date    CATARACT REMOVAL Bilateral    [id number] [address] EXTRACTION  2019       CURRENT MEDICATIONS    Current Outpatient Medications   Medication Sig Dispense Refill    atorvastatin (LIPITOR) 10 MG tablet TAKE ONE TABLET BY MOUTH ONCE NIGHTLY 90 tablet 0    budesonide-formoterol (SYMBICORT) 80-4.5 MCG/ACT AERO Inhale 2 puffs into the lungs 2 times daily 30.6 g 1    albuterol sulfate HFA (VENTOLIN HFA) 108 (90 Base) MCG/ACT inhaler Inhale 2 puffs into the lungs every 6 hours as needed for Wheezing or Shortness of Breath 3 each 1    metFORMIN (GLUCOPHAGE) 500 MG tablet Take 1 tablet by mouth 2 times daily (with meals) 180 tablet 1    buPROPion (WELLBUTRIN XL) 300 MG extended release tablet Take 300 mg by mouth every morning       albuterol (PROVENTIL) (2.5 MG/3ML) 0.083% nebulizer solution Take 3 mLs by nebulization every 6 hours as needed for Wheezing or Shortness of Breath 120 each 5    Lancets MISC 1 each by Does not apply route 2 times daily 100 each 11    lactobacillus (CULTURELLE) CAPS capsule Take 1 capsule by mouth daily 30 capsule 5    Handicap Placard MISC by Does not apply route Expires in 5 years ( 2025 ) 1 each 0    FLUoxetine (PROZAC) 40 MG capsule Take 2 capsules by mouth daily       aspirin 81 MG tablet Take 81 mg by mouth daily      glucose monitoring kit (FREESTYLE) monitoring kit 1 kit by Does not apply route daily (Patient not taking: Reported on 11/15/2021) 1 kit 0    blood glucose monitor strips Test 2 times a day & as needed for symptoms of irregular blood glucose. (Patient not taking: Reported on 11/15/2021) 100 strip 11     No current facility-administered medications for this visit. ALLERGIES    Allergies   Allergen Reactions    Cephalosporins Anaphylaxis    Persantine [Dipyridamole] Hives    Ceftin [Cefuroxime Axetil]      Pt unable to recall type of reaction she had from ceftin    Penicillins        PHYSICAL EXAM   Physical Exam  Vitals and nursing note reviewed. Constitutional:       General: She is not in acute distress. Appearance: She is well-developed. She is not diaphoretic. Interventions: Face mask in place. HENT:      Head: Normocephalic. Right Ear: Hearing normal.      Left Ear: Hearing normal.   Eyes:      General:         Right eye: No discharge. Left eye: No discharge. Pupils: Pupils are equal.   Cardiovascular:      Rate and Rhythm: Normal rate and regular rhythm. Pulses: Normal pulses. Radial pulses are 2+ on the right side and 2+ on the left side. Heart sounds: Normal heart sounds, S1 normal and S2 normal. No murmur heard. Pulmonary:      Effort: Pulmonary effort is normal. No respiratory distress. Breath sounds: Normal breath sounds. No wheezing. Musculoskeletal:      Cervical back: Normal range of motion. Skin:     General: Skin is warm and dry. Neurological:      Mental Status: She is alert and oriented to person, place, and time. Psychiatric:         Behavior: Behavior normal. Behavior is cooperative. ASSESSMENT/PLAN  1.  Type 2 diabetes mellitus with hyperglycemia, without long-term current use of insulin ([facility] [address]  Assessment & Plan:   Well-controlled, continue current medications     Results for orders placed or performed in visit on 03/15/22   POCT glycosylated hemoglobin (Hb A1C)   Result Value Ref Range    Hemoglobin A1C 6.7 %       Orders:  -     POCT glycosylated hemoglobin (Hb A1C)  -      DIABETES FOOT EXAM  -     [facility] - Joe Davis MD, Ophthalmology, [place]  2. Mixed hyperlipidemia  Assessment & Plan:   Well-controlled, continue current medications  Orders:  -     atorvastatin (LIPITOR) 10 MG tablet; TAKE ONE TABLET BY MOUTH ONCE NIGHTLY, Disp-90 tablet, R-0Normal  3. SOB (shortness of breath)  -     budesonide-formoterol (SYMBICORT) 80-4.5 MCG/ACT AERO; Inhale 2 puffs into the lungs 2 times daily, Disp-30.6 g, R-1Normal  4. Chronic obstructive pulmonary disease with acute lower respiratory infection (HCC)  Assessment & Plan:   Borderline controlled, continue current medications. We will send Symbicort to check coverage on new formulary for 2022. If this is not covered or too expensive will provide samples of alternative inhaler. Orders:  -     budesonide-formoterol (SYMBICORT) 80-4.5 MCG/ACT AERO; Inhale 2 puffs into the lungs 2 times daily, Disp-30.6 g, R-1Normal  -     albuterol sulfate HFA (VENTOLIN HFA) 108 (90 Base) MCG/ACT inhaler; Inhale 2 puffs into the lungs every 6 hours as needed for Wheezing or Shortness of Breath, Disp-3 each, R-1Normal  5. Hyperparathyroidism [facility])  Assessment & Plan:   Asymptomatic, continue current treatment plan and following with endocrine as advised. 6. Cervical cancer screening declined     . Jose Miguel Alves received counseling on the following healthy behaviors: nutrition, exercise, medication adherence and tobacco cessation  Reviewed prior labs and health maintenance  Continue current medications, diet and exercise. Discussed use, benefit, and side effects of prescribed medications. Barriers to medication compliance addressed. Patient given educational materials - see patient instructions  Was a self-tracking handout given in paper form or via [employer]? Yes    Requested Prescriptions     Signed Prescriptions Disp Refills    atorvastatin (LIPITOR) 10 MG tablet 90 tablet 0     Sig: TAKE ONE TABLET BY MOUTH ONCE NIGHTLY    budesonide-formoterol (SYMBICORT) 80-4.5 MCG/ACT AERO 30.6 g 1     Sig: Inhale 2 puffs into the lungs 2 times daily    albuterol sulfate HFA (VENTOLIN HFA) 108 (90 Base) MCG/ACT inhaler 3 each 1     Sig: Inhale 2 puffs into the lungs every 6 hours as needed for Wheezing or Shortness of Breath       All patient questions answered. Patient voiced understanding. Quality Measures      BP: 119/62 Blood pressure is normal. Treatment plan consists of No treatment change needed. Lab Results   Component Value Date    LDLCALC 132 (H) 05/18/2019    LDLCHOLESTEROL 122 12/01/2021    (goal LDL reduction with dx if diabetes is 50% LDL reduction)      PHQ Scores 3/15/2022 11/15/2021 6/8/2021   PHQ2 Score 1 0 2   PHQ9 Score 7 0 2     Interpretation of Total Score Depression Severity: 1-4 = Minimal depression, 5-9 = Mild depression, 10-14 = Moderate depression, 15-19 = Moderately severe depression, 20-27 = Severe depression     Return in about 6 months (around 9/15/2022) for diabetes, HgbA1C.     (Please note that portions of this note were completed with a voice recognition program. Efforts were made to edit the dictations but occasionally words are mis-transcribed.)      Electronically signed by ILA Torres CNP on 3/15/22 at 1:07 PM EDT

## 2022-03-30 PROBLEM — R09.89 CHEST CONGESTION: Status: RESOLVED | Noted: 2021-10-24 | Resolved: 2022-03-30

## 2022-03-30 PROBLEM — L05.01 PILONIDAL CYST WITH ABSCESS: Status: RESOLVED | Noted: 2021-02-25 | Resolved: 2022-03-30

## 2022-03-30 PROBLEM — L08.9 SOFT TISSUE INFECTION: Status: RESOLVED | Noted: 2021-11-21 | Resolved: 2022-03-30

## 2022-03-30 PROBLEM — Z53.20 CERVICAL CANCER SCREENING DECLINED: Status: ACTIVE | Noted: 2022-03-30

## 2022-03-30 PROBLEM — R59.0 LYMPHADENOPATHY OF LEFT CERVICAL REGION: Status: RESOLVED | Noted: 2021-11-21 | Resolved: 2022-03-30

## 2022-03-30 ASSESSMENT — ENCOUNTER SYMPTOMS
GASTROINTESTINAL NEGATIVE: 1
SORE THROAT: 0
EYES NEGATIVE: 1
ABDOMINAL PAIN: 0
CONSTIPATION: 0
SHORTNESS OF BREATH: 1
COUGH: 0
NAUSEA: 0
VOMITING: 0
DIARRHEA: 0

## 2022-03-30 NOTE — ASSESSMENT & PLAN NOTE
Well-controlled, continue current medications     Results for orders placed or performed in visit on 03/15/22   POCT glycosylated hemoglobin (Hb A1C)   Result Value Ref Range    Hemoglobin A1C 6.7 %

## 2022-03-30 NOTE — ASSESSMENT & PLAN NOTE
Borderline controlled, continue current medications. We will send Symbicort to check coverage on new formulary for 2022. If this is not covered or too expensive will provide samples of alternative inhaler.

## 2022-09-19 ENCOUNTER — OFFICE VISIT (OUTPATIENT)
Dept: FAMILY MEDICINE CLINIC | Age: 62
End: 2022-09-19
Payer: MEDICARE

## 2022-09-19 VITALS
DIASTOLIC BLOOD PRESSURE: 66 MMHG | HEART RATE: 74 BPM | OXYGEN SATURATION: 97 % | RESPIRATION RATE: 16 BRPM | WEIGHT: 203 LBS | TEMPERATURE: 97.8 F | BODY MASS INDEX: 29.06 KG/M2 | HEIGHT: 70 IN | SYSTOLIC BLOOD PRESSURE: 120 MMHG

## 2022-09-19 DIAGNOSIS — E78.2 MIXED HYPERLIPIDEMIA: ICD-10-CM

## 2022-09-19 DIAGNOSIS — E55.9 VITAMIN D DEFICIENCY: ICD-10-CM

## 2022-09-19 DIAGNOSIS — E11.65 TYPE 2 DIABETES MELLITUS WITH HYPERGLYCEMIA, WITHOUT LONG-TERM CURRENT USE OF INSULIN (HCC): ICD-10-CM

## 2022-09-19 DIAGNOSIS — F33.41 MAJOR DEPRESSIVE DISORDER, RECURRENT, IN PARTIAL REMISSION (HCC): ICD-10-CM

## 2022-09-19 DIAGNOSIS — Z00.00 INITIAL MEDICARE ANNUAL WELLNESS VISIT: Primary | ICD-10-CM

## 2022-09-19 DIAGNOSIS — Z23 NEED FOR VACCINATION AGAINST STREPTOCOCCUS PNEUMONIAE: ICD-10-CM

## 2022-09-19 DIAGNOSIS — E21.3 HYPERPARATHYROIDISM (HCC): ICD-10-CM

## 2022-09-19 DIAGNOSIS — R06.02 SOB (SHORTNESS OF BREATH): ICD-10-CM

## 2022-09-19 DIAGNOSIS — J44.0 CHRONIC OBSTRUCTIVE PULMONARY DISEASE WITH ACUTE LOWER RESPIRATORY INFECTION (HCC): ICD-10-CM

## 2022-09-19 LAB — HBA1C MFR BLD: 6.8 %

## 2022-09-19 PROCEDURE — 99213 OFFICE O/P EST LOW 20 MIN: CPT | Performed by: NURSE PRACTITIONER

## 2022-09-19 PROCEDURE — 83036 HEMOGLOBIN GLYCOSYLATED A1C: CPT | Performed by: NURSE PRACTITIONER

## 2022-09-19 PROCEDURE — 90670 PCV13 VACCINE IM: CPT | Performed by: NURSE PRACTITIONER

## 2022-09-19 PROCEDURE — 3044F HG A1C LEVEL LT 7.0%: CPT | Performed by: NURSE PRACTITIONER

## 2022-09-19 PROCEDURE — G0009 ADMIN PNEUMOCOCCAL VACCINE: HCPCS | Performed by: NURSE PRACTITIONER

## 2022-09-19 PROCEDURE — G0438 PPPS, INITIAL VISIT: HCPCS | Performed by: NURSE PRACTITIONER

## 2022-09-19 ASSESSMENT — PATIENT HEALTH QUESTIONNAIRE - PHQ9
SUM OF ALL RESPONSES TO PHQ QUESTIONS 1-9: 8
9. THOUGHTS THAT YOU WOULD BE BETTER OFF DEAD, OR OF HURTING YOURSELF: 0
1. LITTLE INTEREST OR PLEASURE IN DOING THINGS: 1
SUM OF ALL RESPONSES TO PHQ QUESTIONS 1-9: 8
2. FEELING DOWN, DEPRESSED OR HOPELESS: 1
5. POOR APPETITE OR OVEREATING: 1
8. MOVING OR SPEAKING SO SLOWLY THAT OTHER PEOPLE COULD HAVE NOTICED. OR THE OPPOSITE, BEING SO FIGETY OR RESTLESS THAT YOU HAVE BEEN MOVING AROUND A LOT MORE THAN USUAL: 0
SUM OF ALL RESPONSES TO PHQ QUESTIONS 1-9: 8
4. FEELING TIRED OR HAVING LITTLE ENERGY: 2
7. TROUBLE CONCENTRATING ON THINGS, SUCH AS READING THE NEWSPAPER OR WATCHING TELEVISION: 0
3. TROUBLE FALLING OR STAYING ASLEEP: 3
6. FEELING BAD ABOUT YOURSELF - OR THAT YOU ARE A FAILURE OR HAVE LET YOURSELF OR YOUR FAMILY DOWN: 0
10. IF YOU CHECKED OFF ANY PROBLEMS, HOW DIFFICULT HAVE THESE PROBLEMS MADE IT FOR YOU TO DO YOUR WORK, TAKE CARE OF THINGS AT HOME, OR GET ALONG WITH OTHER PEOPLE: 0
SUM OF ALL RESPONSES TO PHQ QUESTIONS 1-9: 8
SUM OF ALL RESPONSES TO PHQ9 QUESTIONS 1 & 2: 2

## 2022-09-19 ASSESSMENT — LIFESTYLE VARIABLES
HOW MANY STANDARD DRINKS CONTAINING ALCOHOL DO YOU HAVE ON A TYPICAL DAY: PATIENT DOES NOT DRINK
HOW OFTEN DO YOU HAVE A DRINK CONTAINING ALCOHOL: NEVER

## 2022-09-19 NOTE — PATIENT INSTRUCTIONS
New Updates for [facility]    Thank you for choosing [facility] to give you the best care! [facility] ([facility]) is always trying to think of new ways to help their patients. We are asking all patients to try out the new digital registration that is now available through the [employer] [address]. Down load today! . Via the lizbeth you're now able to update your personal and registration information prior to your upcoming appointment. This will save you time once you arrive at the office to check-in, not to mention your information remains safe!! Many other perks come from signing up for an account, such as:  Requesting refills  Scheduling an appointment  Completing an E-Visit  Sending a message to the office/provider  Having access to your medication list  Paying your bill/copay prior to your appointment  Scheduling your yearly mammogram  Review your test results    If you are not familiar the [facility], please ask one of us and we will be happy to answer any questions or help you set-up your account. Personalized Preventive Plan for [facility] - 9/19/2022  Medicare offers a range of preventive health benefits. Some of the tests and screenings are paid in full while other may be subject to a deductible, co-insurance, and/or copay. Some of these benefits include a comprehensive review of your medical history including lifestyle, illnesses that may run in your family, and various assessments and screenings as appropriate. After reviewing your medical record and screening and assessments performed today your provider may have ordered immunizations, labs, imaging, and/or referrals for you. A list of these orders (if applicable) as well as your Preventive Care list are included within your After Visit Summary for your review.     Other Preventive Recommendations:    A preventive eye exam performed by an eye specialist is recommended every 1-2 years to screen for glaucoma; cataracts, macular degeneration, and other eye disorders. A preventive dental visit is recommended every 6 months. Try to get at least 150 minutes of exercise per week or 10,000 steps per day on a pedometer . Order or download the FREE \"Exercise & Physical Activity: Your Everyday Guide\" from [employer]. Call 4-729.556.6611 or search [employer] online. You need 8585-9948 mg of calcium and 1911-9026 IU of vitamin D per day. It is possible to meet your calcium requirement with diet alone, but a vitamin D supplement is usually necessary to meet this goal.  When exposed to the sun, use a sunscreen that protects against both UVA and UVB radiation with an SPF of 30 or greater. Reapply every 2 to 3 hours or after sweating, drying off with a towel, or swimming. Always wear a seat belt when traveling in a car. Always wear a helmet when riding a bicycle or motorcycle.

## 2022-09-19 NOTE — PROGRESS NOTES
Depression screening done  Financial resource strain done  Chief Complaint   Patient presents with    Medicare AWV

## 2022-09-19 NOTE — PROGRESS NOTES
Medicare Annual Wellness Visit    Love Hassan is here for Medicare AWV    Assessment & Plan   Initial Medicare annual wellness visit  Type 2 diabetes mellitus with hyperglycemia, without long-term current use of insulin (HCC)  -     POCT glycosylated hemoglobin (Hb A1C)  -     CBC with Auto Differential; Future  -     Comprehensive Metabolic Panel; Future  Mixed hyperlipidemia  -     Lipid Panel; Future  SOB (shortness of breath)  Chronic obstructive pulmonary disease with acute lower respiratory infection ([facility] [address]  -     fluticasone-umeclidin-vilant (TRELEGY ELLIPTA) 100-62.5-25 MCG/INH AEPB; Inhale 1 puff into the lungs daily, Disp-4 each, R-0Lot #: [id number] Exp: 01/2024Sample  Hyperparathyroidism (HCC)  Major depressive disorder, recurrent, in partial remission (HCC)  Vitamin D deficiency  -     Vitamin D 25 Hydroxy; Future  -     Cholecalciferol (VITAMIN D3) 125 MCG (5000 UT) CAPS; Take 1 capsule by mouth daily, Disp-90 capsule, R-1Normal  Need for vaccination against Streptococcus pneumoniae  -     Pneumococcal, PCV-13, PREVNAR 13, (age 10 wks+), IM     Diabetes stable. Continue current medications. We will get updated routine labs. Hyperlipidemia stable. Continue Lipitor, dietary and exercise modifications. Hyperparathyroidism being managed by endocrine. Major depressive disorder being managed by counselor and psychiatry. COPD not well controlled. Will provide sample of Trelegy as inhalers are cost prohibitive to fill monthly. Encourage patient to call office for samples regularly. Will provide Prevnar 13. Recommendations for Preventive Services Due: see orders and patient instructions/AVS.  Recommended screening schedule for the next 5-10 years is provided to the patient in written form: see Patient Instructions/AVS.     Return in 4 months (on 1/19/2023) for Medicare Annual Wellness Visit in 1 year, HgbA1C, diabetes, BP.      Subjective   The following acute and/or chronic problems were also addressed today:    Type 2 DM- Taking Metformin 500 mg daily. She had previously taken 500 mg twice daily, but never took 1000 mg BID  Not checking BG most   Denies s/s of hypo/hyperglycemia. Today's A1C    Lab Results   Component Value Date    LABA1C 6.8 09/19/2022    LABA1C 6.7 03/15/2022    LABA1C 6.2 10/08/2021     Lab Results   Component Value Date     03/29/2019       COPD- usually receives inhalers from her office. She has been using Nebulizer at least once per day since she hasn't had a sample of any inhalers recently. Anxiety/Depression- stable on Prozac 80 mg and Wellbutrin 300 mg. Tolerating medications well. Continues to see Dr. Yamileth Villatoro every 3 months. Hyperlipemia- continues taking Lipitor 10 mg daily. Tolerating medication well. Hyperparathyroidism- seeing [facility] Khadra [facility] at [facility]. No recent updates. Vitamin D deficiency-not taking any vitamin D as she was not aware of the deficiency. Fall- recent fall on Saturday. She missed the running board on a family members truck. She hit her head, right shoulder, back of arm and right hip. Patient's complete Health Risk Assessment and screening values have been reviewed and are found in Flowsheets. The following problems were reviewed today and where indicated follow up appointments were made and/or referrals ordered. Positive Risk Factor Screenings with Interventions:    Fall Risk:  Do you feel unsteady or are you worried about falling? : no  2 or more falls in past year?: (!) yes  Fall with injury in past year?: no   Fall Risk Interventions:    Home safety tips provided  Patient declines any further evaluation/treatment for this issue     Depression:  PHQ-2 Score: 2  PHQ-9 Total Score: 8    Severity:1-4 = minimal depression, 5-9 = mild depression, 10-14 = moderate depression, 15-19 = moderately severe depression, 20-27 = severe depression  Depression Interventions:  Seeing Psychiatrist every 3 months.    Patient declines any further evaluation/treatment for this issue    Tobacco Use:  Tobacco Use: High Risk    Smoking Tobacco Use: Every Day    Smokeless Tobacco Use: Never     E-cigarette/Vaping       Questions Responses    E-cigarette/Vaping Use Never User    Start Date     Passive Exposure     Quit Date     Counseling Given     Comments           Substance Use - Tobacco Interventions:  tobacco cessation tips and resources provided, continues to cut back on smoking. She is smoking 2 packs per week. General Health and ACP:  General  In general, how would you say your health is?: Fair  In the past 7 days, have you experienced any of the following: New or Increased Pain, New or Increased Fatigue, Loneliness, Social Isolation, Stress or Anger?: No  Do you get the social and emotional support that you need?: Yes  Do you have a Living Will?: (!) No    Advance Directives       Power of [address] Will ACP-Advance Directive ACP-Power of     Not on File Not on File Not on File Not on File          General Health Risk Interventions:  No Living Will: Advance Care Planning addressed with patient today and Patient declines ACP discussion/assistance     Hearing/Vision:  Do you or your family notice any trouble with your hearing that hasn't been managed with hearing aids?: (!) Yes  Do you have difficulty driving, watching TV, or doing any of your daily activities because of your eyesight?: (!) Yes  Have you had an eye exam within the past year?: (!) No  No results found.   Hearing/Vision Interventions:  Hearing concerns:  patient declines any further evaluation/treatment for hearing issues  Vision concerns:  patient encouraged to make appointment with his/her eye specialist            Objective   Vitals:    09/19/22 1311   BP: 120/66   Site: Left Upper Arm   Position: Sitting   Cuff Size: Large Adult   Pulse: 74   Resp: 16   Temp: 97.8 °F (36.6 °C)   TempSrc: Temporal   SpO2: 97%   Weight: 203 lb (92.1 kg)   Height: 5' 10\" (1.778 m) Body mass index is 29.13 kg/m².       General Appearance: alert and oriented to person, place and time, well developed and well- nourished, in no acute distress  Skin: warm and dry, no rash or erythema  Head: normocephalic and atraumatic  Eyes: pupils equal, round, and reactive to light, extraocular eye movements intact, conjunctivae normal  ENT: tympanic membrane, external ear and ear canal normal bilaterally, nose without deformity, nasal mucosa and turbinates normal without polyps  Neck: supple and non-tender without mass, no thyromegaly or thyroid nodules, no cervical lymphadenopathy  Pulmonary/Chest: clear to auscultation bilaterally- no wheezes, rales or rhonchi, normal air movement, no respiratory distress  Cardiovascular: normal rate, regular rhythm, normal S1 and S2, no murmurs, rubs, clicks, or gallops, distal pulses intact, no carotid bruits  Abdomen: soft, non-tender, non-distended, normal bowel sounds, no masses or organomegaly  Extremities: no cyanosis, clubbing or edema  Musculoskeletal: normal range of motion, no joint swelling, deformity or tenderness  Neurologic: reflexes normal and symmetric, no cranial nerve deficit, gait, coordination and speech normal  General Appearance: alert and oriented to person, place and time, well-developed and well-nourished, in no acute distress  Skin: warm and dry, no rash or erythema; right arm ecchymosis   Head: normocephalic and atraumatic  Eyes: pupils equal, round, and reactive to light, extraocular eye movements intact, conjunctivae normal  ENT: tympanic membrane, external ear and ear canal normal bilaterally, oropharynx clear and moist with normal mucous membranes  Neck: neck supple and non tender without mass, no thyromegaly or thyroid nodules, no cervical lymphadenopathy   Pulmonary/Chest: clear to auscultation bilaterally- no wheezes, rales or rhonchi, normal air movement, no respiratory distress  Cardiovascular: normal rate, normal S1 and S2, and no gallops  Abdomen: soft, non-tender, non-distended, normal bowel sounds, no masses or organomegaly  Extremities: no cyanosis and no clubbing  Musculoskeletal: normal range of motion, no joint swelling, deformity or tenderness  Neurologic: gait and coordination normal and speech normal       Allergies   Allergen Reactions    Cephalosporins Anaphylaxis    Persantine [Dipyridamole] Hives    Ceftin [Cefuroxime Axetil]      Pt unable to recall type of reaction she had from ceftin    Penicillins      Prior to Visit Medications    Medication Sig Taking?  Authorizing Provider   fluticasone-umeclidin-vilant (TRELEGY ELLIPTA) 100-62.5-25 MCG/INH AEPB Inhale 1 puff into the lungs daily Yes ILA Alfaro CNP   Cholecalciferol (VITAMIN D3) 125 MCG (5000 UT) CAPS Take 1 capsule by mouth daily Yes ILA Alfaro CNP   atorvastatin (LIPITOR) 10 MG tablet TAKE ONE TABLET BY MOUTH ONCE NIGHTLY Yes ILA Alfaro CNP   albuterol sulfate HFA (VENTOLIN HFA) 108 (90 Base) MCG/ACT inhaler Inhale 2 puffs into the lungs every 6 hours as needed for Wheezing or Shortness of Breath Yes ILA Alfaro CNP   buPROPion (WELLBUTRIN XL) 300 MG extended release tablet Take 300 mg by mouth every morning  Yes Historical Provider, MD   albuterol (PROVENTIL) (2.5 MG/3ML) 0.083% nebulizer solution Take 3 mLs by nebulization every 6 hours as needed for Wheezing or Shortness of Breath Yes ILA Alfaro CNP   lactobacillus (CULTURELLE) CAPS capsule Take 1 capsule by mouth daily Yes ILA Alfaro CNP   Handicap Placard MISC by Does not apply route Expires in 5 years ( 2025 ) Yes ILA Alfaro CNP   FLUoxetine (PROZAC) 40 MG capsule Take 2 capsules by mouth daily  Yes Historical Provider, MD   aspirin 81 MG tablet Take 81 mg by mouth daily Yes Historical Provider, MD   metFORMIN (GLUCOPHAGE) 500 MG tablet Take 1 tablet by mouth 2 times daily (with meals)  ILA Alfaro CNP       CareTeam (Including outside providers/suppliers regularly involved in providing care):   Patient Care Team:  ILA Riley CNP as PCP - General (Nurse Practitioner)  ILA Riley CNP as PCP - [facility] Empaneled Provider  Doug Segovia MD as Consulting Physician (Gastroenterology)     Reviewed and updated this visit:  Tobacco  Allergies  Meds  Problems  Med Hx  Surg Hx  Soc Hx  Fam Hx

## 2022-09-30 RX ORDER — FLUTICASONE FUROATE, UMECLIDINIUM BROMIDE AND VILANTEROL TRIFENATATE 100; 62.5; 25 UG/1; UG/1; UG/1
1 POWDER RESPIRATORY (INHALATION) DAILY
Qty: 4 EACH | Refills: 0 | COMMUNITY
Start: 2022-09-30

## 2022-10-12 ENCOUNTER — TELEPHONE (OUTPATIENT)
Dept: FAMILY MEDICINE CLINIC | Age: 62
End: 2022-10-12

## 2022-10-12 DIAGNOSIS — J44.1 COPD EXACERBATION (HCC): Primary | ICD-10-CM

## 2022-10-12 RX ORDER — AZITHROMYCIN 250 MG/1
250 TABLET, FILM COATED ORAL SEE ADMIN INSTRUCTIONS
Qty: 6 TABLET | Refills: 0 | Status: SHIPPED | OUTPATIENT
Start: 2022-10-12 | End: 2022-10-17

## 2022-10-12 RX ORDER — METHYLPREDNISOLONE 4 MG/1
TABLET ORAL
Qty: 1 KIT | Refills: 0 | Status: SHIPPED | OUTPATIENT
Start: 2022-10-12

## 2022-10-12 NOTE — TELEPHONE ENCOUNTER
Would not typically treat with antibiotics this soon, but with her COPD may treat with steroids and abx. Any fevers? What is she taking OTC? How is her breathing?

## 2022-10-12 NOTE — TELEPHONE ENCOUNTER
Zpak and medrol dose pack sent to [facility]. Keep us posted if she needs anything else.

## 2022-10-12 NOTE — TELEPHONE ENCOUNTER
Patient is calling stating that she has deep cough with green mucus and a sore throat x Monday.     Please advise

## 2022-10-12 NOTE — TELEPHONE ENCOUNTER
She has had fevers last 3 days, but good today. Using her Inhalers, nebulizer breathing treatments and cough drops.      Allergies verified and using [facility] [address]

## 2022-11-15 ENCOUNTER — TELEPHONE (OUTPATIENT)
Dept: FAMILY MEDICINE CLINIC | Age: 62
End: 2022-11-15

## 2022-12-09 ENCOUNTER — TELEPHONE (OUTPATIENT)
Dept: FAMILY MEDICINE CLINIC | Age: 62
End: 2022-12-09

## 2022-12-09 DIAGNOSIS — J44.1 COPD EXACERBATION (HCC): Primary | ICD-10-CM

## 2022-12-09 RX ORDER — BUPROPION HYDROCHLORIDE 150 MG/1
TABLET ORAL
COMMUNITY
Start: 2022-12-06 | End: 2023-01-23 | Stop reason: ALTCHOICE

## 2022-12-09 RX ORDER — FLUTICASONE FUROATE, UMECLIDINIUM BROMIDE AND VILANTEROL TRIFENATATE 100; 62.5; 25 UG/1; UG/1; UG/1
1 POWDER RESPIRATORY (INHALATION) DAILY
Qty: 2 EACH | Refills: 0 | COMMUNITY
Start: 2022-12-09

## 2022-12-09 NOTE — TELEPHONE ENCOUNTER
Pt asking for samples: Trelegy. Needs enough until Jan.2023  Since she will have new ins. Okay to wait until Monday.

## 2023-01-02 NOTE — ASSESSMENT & PLAN NOTE
Uncontrolled, changes made today: Will order ultrasound of head and neck. Advised patient to have this performed if lymphadenopathy does not improve after soft tissue infection resolves.   Discussed that this may take a couple of weeks after the antibiotic is completed     Patient voiced understanding of plan of care [employer]

## 2023-01-20 DIAGNOSIS — E11.65 TYPE 2 DIABETES MELLITUS WITH HYPERGLYCEMIA, WITHOUT LONG-TERM CURRENT USE OF INSULIN (HCC): ICD-10-CM

## 2023-01-23 ENCOUNTER — OFFICE VISIT (OUTPATIENT)
Dept: FAMILY MEDICINE CLINIC | Age: 63
End: 2023-01-23
Payer: COMMERCIAL

## 2023-01-23 VITALS
BODY MASS INDEX: 29.2 KG/M2 | HEIGHT: 70 IN | OXYGEN SATURATION: 96 % | WEIGHT: 204 LBS | HEART RATE: 78 BPM | DIASTOLIC BLOOD PRESSURE: 70 MMHG | SYSTOLIC BLOOD PRESSURE: 120 MMHG | RESPIRATION RATE: 18 BRPM | TEMPERATURE: 97.8 F

## 2023-01-23 DIAGNOSIS — F33.2 SEVERE EPISODE OF RECURRENT MAJOR DEPRESSIVE DISORDER, WITHOUT PSYCHOTIC FEATURES (HCC): ICD-10-CM

## 2023-01-23 DIAGNOSIS — E11.65 TYPE 2 DIABETES MELLITUS WITH HYPERGLYCEMIA, WITHOUT LONG-TERM CURRENT USE OF INSULIN (HCC): Primary | ICD-10-CM

## 2023-01-23 DIAGNOSIS — E21.3 HYPERPARATHYROIDISM (HCC): ICD-10-CM

## 2023-01-23 DIAGNOSIS — J44.9 CHRONIC OBSTRUCTIVE PULMONARY DISEASE, UNSPECIFIED COPD TYPE (HCC): ICD-10-CM

## 2023-01-23 DIAGNOSIS — E78.2 MIXED HYPERLIPIDEMIA: ICD-10-CM

## 2023-01-23 LAB — HBA1C MFR BLD: 7 %

## 2023-01-23 PROCEDURE — 99213 OFFICE O/P EST LOW 20 MIN: CPT | Performed by: NURSE PRACTITIONER

## 2023-01-23 PROCEDURE — 83036 HEMOGLOBIN GLYCOSYLATED A1C: CPT | Performed by: NURSE PRACTITIONER

## 2023-01-23 PROCEDURE — 3051F HG A1C>EQUAL 7.0%<8.0%: CPT | Performed by: NURSE PRACTITIONER

## 2023-01-23 RX ORDER — BUPROPION HYDROCHLORIDE 300 MG/1
300 TABLET ORAL EVERY MORNING
Qty: 28 TABLET | Refills: 0 | COMMUNITY
Start: 2023-01-23

## 2023-01-23 SDOH — ECONOMIC STABILITY: FOOD INSECURITY: WITHIN THE PAST 12 MONTHS, THE FOOD YOU BOUGHT JUST DIDN'T LAST AND YOU DIDN'T HAVE MONEY TO GET MORE.: NEVER TRUE

## 2023-01-23 SDOH — ECONOMIC STABILITY: FOOD INSECURITY: WITHIN THE PAST 12 MONTHS, YOU WORRIED THAT YOUR FOOD WOULD RUN OUT BEFORE YOU GOT MONEY TO BUY MORE.: NEVER TRUE

## 2023-01-23 ASSESSMENT — PATIENT HEALTH QUESTIONNAIRE - PHQ9
SUM OF ALL RESPONSES TO PHQ9 QUESTIONS 1 & 2: 4
4. FEELING TIRED OR HAVING LITTLE ENERGY: 2
SUM OF ALL RESPONSES TO PHQ QUESTIONS 1-9: 13
SUM OF ALL RESPONSES TO PHQ QUESTIONS 1-9: 13
5. POOR APPETITE OR OVEREATING: 1
SUM OF ALL RESPONSES TO PHQ QUESTIONS 1-9: 12
2. FEELING DOWN, DEPRESSED OR HOPELESS: 2
10. IF YOU CHECKED OFF ANY PROBLEMS, HOW DIFFICULT HAVE THESE PROBLEMS MADE IT FOR YOU TO DO YOUR WORK, TAKE CARE OF THINGS AT HOME, OR GET ALONG WITH OTHER PEOPLE: 1
7. TROUBLE CONCENTRATING ON THINGS, SUCH AS READING THE NEWSPAPER OR WATCHING TELEVISION: 1
1. LITTLE INTEREST OR PLEASURE IN DOING THINGS: 2
8. MOVING OR SPEAKING SO SLOWLY THAT OTHER PEOPLE COULD HAVE NOTICED. OR THE OPPOSITE, BEING SO FIGETY OR RESTLESS THAT YOU HAVE BEEN MOVING AROUND A LOT MORE THAN USUAL: 1
3. TROUBLE FALLING OR STAYING ASLEEP: 2
6. FEELING BAD ABOUT YOURSELF - OR THAT YOU ARE A FAILURE OR HAVE LET YOURSELF OR YOUR FAMILY DOWN: 1
9. THOUGHTS THAT YOU WOULD BE BETTER OFF DEAD, OR OF HURTING YOURSELF: 1
SUM OF ALL RESPONSES TO PHQ QUESTIONS 1-9: 13

## 2023-01-23 ASSESSMENT — ENCOUNTER SYMPTOMS
CONSTIPATION: 0
VOMITING: 0
DIARRHEA: 0
ABDOMINAL PAIN: 0
EYES NEGATIVE: 1
SORE THROAT: 0
GASTROINTESTINAL NEGATIVE: 1
COUGH: 0
NAUSEA: 0
SHORTNESS OF BREATH: 1

## 2023-01-23 ASSESSMENT — COLUMBIA-SUICIDE SEVERITY RATING SCALE - C-SSRS
1. WITHIN THE PAST MONTH, HAVE YOU WISHED YOU WERE DEAD OR WISHED YOU COULD GO TO SLEEP AND NOT WAKE UP?: NO
2. HAVE YOU ACTUALLY HAD ANY THOUGHTS OF KILLING YOURSELF?: YES
3. HAVE YOU BEEN THINKING ABOUT HOW YOU MIGHT KILL YOURSELF?: NO
5. HAVE YOU STARTED TO WORK OUT OR WORKED OUT THE DETAILS OF HOW TO KILL YOURSELF? DO YOU INTEND TO CARRY OUT THIS PLAN?: NO
4. HAVE YOU HAD THESE THOUGHTS AND HAD SOME INTENTION OF ACTING ON THEM?: NO
6. HAVE YOU EVER DONE ANYTHING, STARTED TO DO ANYTHING, OR PREPARED TO DO ANYTHING TO END YOUR LIFE?: NO

## 2023-01-23 ASSESSMENT — SOCIAL DETERMINANTS OF HEALTH (SDOH): HOW HARD IS IT FOR YOU TO PAY FOR THE VERY BASICS LIKE FOOD, HOUSING, MEDICAL CARE, AND HEATING?: NOT HARD AT ALL

## 2023-01-23 NOTE — PATIENT INSTRUCTIONS
New Updates for [facility]    Thank you for choosing [facility] to give you the best care! [facility] [place] ([facility]) is always trying to think of new ways to help their patients. We are asking all patients to try out the new digital registration that is now available through the [facility] [address]. Down load today! . Via the [facility] you're now able to update your personal and registration information prior to your upcoming appointment. This will save you time once you arrive at the office to check-in, not to mention your information remains safe!! Many other perks come from signing up for an account, such as:  Requesting refills  Scheduling an appointment  Completing an E-Visit  Sending a message to the office/provider  Having access to your medication list  Paying your bill/copay prior to your appointment  Scheduling your yearly mammogram  Review your test results    If you are not familiar the [facility], please ask one of us and we will be happy to answer any questions or help you set-up your account.

## 2023-01-23 NOTE — ASSESSMENT & PLAN NOTE
Asymptomatic, continue current treatment plan and lifestyle modifications recommended. Patient d/c lipitor due to myalgias.  Encouraged continued dietary and exercise modification

## 2023-01-23 NOTE — PROGRESS NOTES
[facility]  [address]  [place] [address] [address]  Dept: [phone]    1/23/2023    CHIEF COMPLAINT    Chief Complaint   Patient presents with    Diabetes       HPI    Fay Tate is a 65 y.o. female who presents   Chief Complaint   Patient presents with    Diabetes   . HPI    Appointment to f/u on DM and hypertension. Type 2 DM- Taking Metformin 500 mg daily. Wanting to know if we can stop this due to her new insurance charging her $45.  Denies s/s of hypo/hyperglycemia. Today's A1C 7.0    Lab Results   Component Value Date    LABA1C 7.0 01/23/2023    LABA1C 6.8 09/19/2022    LABA1C 6.7 03/15/2022     Lab Results   Component Value Date     03/29/2019         COPD- usually receives inhalers from this office. Taking Trelegy daily with samples. This has enabled her to use he nebulizer occasionally rather than daily. Anxiety/Depression- stable on Prozac 80 mg and Wellbutrin 300 mg. Tolerating medications well  Recently found out that her insurance will not cover her Wellbutrin. Will see her 2/17/2023 to discuss further. Continues to see Dr. Yahir Holder every 3 months. PHQ-9 Total Score: 13 (1/23/2023  1:04 PM)  Thoughts that you would be better off dead, or of hurting yourself in some way: 1 (1/23/2023  1:04 PM)     Hyperlipemia- d/c lipitor 6-7 months ago due to myalgias in legs      Hyperparathyroidism- has previously seen Dr. Kevin Carballo at [facility]. No recent updates. Has not gone back recently.        Vitals:    01/23/23 1302   BP: 120/70   Site: Left Upper Arm   Position: Sitting   Cuff Size: Large Adult   Pulse: 78   Resp: 18   Temp: 97.8 °F (36.6 °C)   TempSrc: Temporal   SpO2: 96%   Weight: 204 lb (92.5 kg)   Height: 5' 10\" (1.778 m)       Wt Readings from Last 3 Encounters:   01/23/23 204 lb (92.5 kg)   09/19/22 203 lb (92.1 kg)   11/15/21 202 lb 6.4 oz (91.8 kg)     BP Readings from Last 3 Encounters:   01/23/23 120/70 09/19/22 120/66   03/15/22 119/62       REVIEW OF SYSTEMS    Review of Systems   Constitutional:  Positive for fatigue. Negative for chills and fever. HENT:  Negative for sore throat. Eyes: Negative. Negative for visual disturbance. Respiratory:  Positive for shortness of breath (Improved with regular Trelegy samples). Negative for cough. Cardiovascular: Negative. Negative for chest pain and palpitations. Gastrointestinal: Negative. Negative for abdominal pain, constipation, diarrhea, nausea and vomiting. Genitourinary: Negative. Negative for difficulty urinating. Musculoskeletal: Negative. Negative for myalgias. Neurological: Negative. Negative for dizziness and headaches. Psychiatric/Behavioral:          See HPI      PAST MEDICAL HISTORY    Past Medical History:   Diagnosis Date    Adenoma of right adrenal gland     Stable 4/2019. No repeat imaging advised.      COPD (chronic obstructive pulmonary disease) (HCC)     Depression     Lymphadenopathy of left cervical region 11/21/2021    Pilonidal cyst with abscess 2/25/2021    Pneumonia 2017 & 2019    twice     Pre-diabetes     Type 2 diabetes mellitus ([facility] [address]        FAMILY HISTORY    Family History   Problem Relation Age of Onset    Heart Disease Mother     Diabetes Mother     Heart Attack Mother 36        COD, she had 3 heart attacks     Heart Disease Father     Diabetes Father     Heart Attack Father 50        COD     Diabetes Sister     Hypertension Sister     Atrial Fibrillation Sister     Diabetes type 2  Maternal Grandmother         COD    Heart Disease Maternal Grandfather         COD    Diabetes type 2  Paternal Grandmother     Heart Disease Paternal Grandmother     Diabetes type 2  Paternal Grandfather     Heart Disease Paternal Grandfather     Stroke Brother         COD     Alcohol Abuse Brother     Hypertension Brother     ADHD Brother     Heart Attack Brother 36        several     Heart Attack Paternal Uncle     Pancreatic Cancer Paternal Uncle     Pancreatic Cancer Paternal Uncle        SOCIAL HISTORY    Social History     Socioeconomic History    Marital status:      Spouse name: None    Number of children: None    Years of education: None    Highest education level: None   Tobacco Use    Smoking status: Every Day     Packs/day: 0.50     Years: 35.00     Pack years: 17.50     Types: Cigarettes     Start date: 9/30/1980    Smokeless tobacco: Never   Vaping Use    Vaping Use: Never used   Substance and Sexual Activity    Alcohol use: No    Drug use: No     Social Determinants of Health     Financial Resource Strain: Low Risk     Difficulty of Paying Living Expenses: Not hard at all   Food Insecurity: No Food Insecurity    Worried About Running Out of Food in the Last Year: Never true    Ran Out of Food in the Last Year: Never true   Transportation Needs: No Transportation Needs    Lack of Transportation (Medical): No    Lack of Transportation (Non-Medical):  No   Physical Activity: Insufficiently Active    Days of Exercise per Week: 1 day    Minutes of Exercise per Session: 20 min       SURGICAL HISTORY    Past Surgical History:   Procedure Laterality Date    CATARACT REMOVAL Bilateral     LUMBAR LAMINECTOMY  1991    TOOTH EXTRACTION  2019       CURRENT MEDICATIONS    Current Outpatient Medications   Medication Sig Dispense Refill    buPROPion (WELLBUTRIN XL) 300 MG extended release tablet Take 1 tablet by mouth every morning 28 tablet 0    metFORMIN (GLUCOPHAGE) 500 MG tablet Take 1 tablet by mouth 2 times daily (with meals) 180 tablet 1    fluticasone-umeclidin-vilant (TRELEGY ELLIPTA) 100-62.5-25 MCG/INH AEPB Inhale 1 puff into the lungs daily 4 each 0    albuterol (PROVENTIL) (2.5 MG/3ML) 0.083% nebulizer solution Take 3 mLs by nebulization every 6 hours as needed for Wheezing or Shortness of Breath 120 each 5    lactobacillus (CULTURELLE) CAPS capsule Take 1 capsule by mouth daily 30 capsule 5    Handicap Placard MISC by Does not apply route Expires in 5 years ( 2025 ) 1 each 0    FLUoxetine (PROZAC) 40 MG capsule Take 2 capsules by mouth daily       aspirin 81 MG tablet Take 81 mg by mouth daily      Cholecalciferol (VITAMIN D3) 125 MCG (5000 UT) CAPS Take 1 capsule by mouth daily 90 capsule 1     No current facility-administered medications for this visit. ALLERGIES    Allergies   Allergen Reactions    Cephalosporins Anaphylaxis    Persantine [Dipyridamole] Hives    Ceftin [Cefuroxime Axetil]      Pt unable to recall type of reaction she had from ceftin    Lipitor [Atorvastatin] Myalgia    Penicillins        PHYSICAL EXAM   Physical Exam  Vitals and nursing note reviewed. Constitutional:       General: She is not in acute distress. Appearance: She is well-developed, well-groomed and overweight. She is not diaphoretic. Interventions: Face mask in place. HENT:      Head: Normocephalic. Right Ear: Hearing normal.      Left Ear: Hearing normal.   Eyes:      General:         Right eye: No discharge. Left eye: No discharge. Pupils: Pupils are equal.   Cardiovascular:      Rate and Rhythm: Normal rate and regular rhythm. Pulses: Normal pulses. Radial pulses are 2+ on the right side and 2+ on the left side. Heart sounds: Normal heart sounds, S1 normal and S2 normal. No murmur heard. Pulmonary:      Effort: Pulmonary effort is normal. No respiratory distress. Breath sounds: Normal breath sounds. No wheezing. Musculoskeletal:      Cervical back: Normal range of motion. Skin:     General: Skin is warm and dry. Neurological:      Mental Status: She is alert and oriented to person, place, and time. Psychiatric:         Behavior: Behavior normal. Behavior is cooperative. ASSESSMENT/PLAN  1.  Type 2 diabetes mellitus with hyperglycemia, without long-term current use of insulin ([facility])  Assessment & Plan:   Asymptomatic, continue current medications, continue current treatment plan and lifestyle modifications recommended. Advised against d/c metformin due to rise in A1C. Patient offered referral for pharmacist Catie Alcazar to discuss medications and cost. Declines at this time. Reprinted labs for patient to complete. Advised to get DM eye exam.   Orders:  -     POCT glycosylated hemoglobin (Hb A1C)  -     Protein / creatinine ratio, urine; Future  2. Chronic obstructive pulmonary disease, unspecified COPD type ([facility] [address]  Assessment & Plan:   Well-controlled, continue current medications, continue current treatment plan and medication adherence emphasized  3. Mixed hyperlipidemia  Assessment & Plan:   Asymptomatic, continue current treatment plan and lifestyle modifications recommended. Patient d/c lipitor due to myalgias. Encouraged continued dietary and exercise modification  4. Hyperparathyroidism [facility])  Assessment & Plan:   At goal, continue current medications and continue current treatment plan. Patient has not seen endocrine in sometime. Will recheck labs   Orders:  -     PTH, Intact with Ionized Calcium; Future  5. Severe episode of recurrent major depressive disorder, without psychotic features [facility])  Assessment & Plan:   Monitored by specialist- no acute findings meriting change in the plan. Samples given of Wellbutrin as patient's new insurance does not cover this. Patient to discuss further with Dr. Matilde To for alternative POC or request for formulary exception. Orders:  -     buPROPion (WELLBUTRIN XL) 300 MG extended release tablet;  Take 1 tablet by mouth every morning, Disp-28 tablet, R-0Lot #: [id number] Exp: 06/30/2023Sample     Results for orders placed or performed in visit on 01/23/23   POCT glycosylated hemoglobin (Hb A1C)   Result Value Ref Range    Hemoglobin A1C 7.0 %       Doreen received counseling on the following healthy behaviors: nutrition, exercise, medication adherence, and tobacco cessation  Reviewed prior labs and health maintenance  Continue current medications, diet and exercise. Discussed use, benefit, and side effects of prescribed medications. Barriers to medication compliance addressed. Patient given educational materials - see patient instructions  Was a self-tracking handout given in paper form or via [employer]? Yes    Requested Prescriptions     Signed Prescriptions Disp Refills    buPROPion (WELLBUTRIN XL) 300 MG extended release tablet 28 tablet 0     Sig: Take 1 tablet by mouth every morning       All patient questions answered. Patient voiced understanding. Quality Measures    Body mass index is 29.27 kg/m². Elevated. Weight control planned discussed Healthy diet and regular exercise. BP: 120/70 Blood pressure is Normal. Treatment plan consists of No treatment change needed. Lab Results   Component Value Date    LDLCALC 132 (H) 05/18/2019    LDLCHOLESTEROL 122 12/01/2021    (goal LDL reduction with dx if diabetes is 50% LDL reduction)      PHQ Scores 1/23/2023 9/19/2022 3/15/2022 11/15/2021 6/8/2021   PHQ2 Score 4 2 1 0 2   PHQ9 Score 13 8 7 0 2     Interpretation of Total Score Depression Severity: 1-4 = Minimal depression, 5-9 = Mild depression, 10-14 = Moderate depression, 15-19 = Moderately severe depression, 20-27 = Severe depression     Return in about 4 months (around 5/23/2023) for HgbA1C, BP, diabetes.     (Please note that portions of this note were completed with a voice recognition program. Efforts were made to edit the dictations but occasionally words are mis-transcribed.)      Electronically signed by ILA Perez Mai, CNP on 1/23/23 at 1:12 PM EST

## 2023-01-23 NOTE — ASSESSMENT & PLAN NOTE
At goal, continue current medications and continue current treatment plan. Patient has not seen endocrine in sometime.  Will recheck labs

## 2023-01-23 NOTE — ASSESSMENT & PLAN NOTE
Asymptomatic, continue current medications, continue current treatment plan and lifestyle modifications recommended. Advised against d/c metformin due to rise in A1C. Patient offered referral for pharmacist Marisa Hall to discuss medications and cost. Declines at this time. Reprinted labs for patient to complete.  Advised to get DM eye exam.

## 2023-01-23 NOTE — ASSESSMENT & PLAN NOTE
Monitored by specialist- no acute findings meriting change in the plan. Samples given of Wellbutrin as patient's new insurance does not cover this. Patient to discuss further with Dr. Maryanne Garner for alternative POC or request for formulary exception.

## 2023-03-27 ENCOUNTER — TELEPHONE (OUTPATIENT)
Dept: FAMILY MEDICINE CLINIC | Age: 63
End: 2023-03-27

## 2023-03-27 DIAGNOSIS — J44.9 CHRONIC OBSTRUCTIVE PULMONARY DISEASE, UNSPECIFIED COPD TYPE (HCC): Primary | ICD-10-CM

## 2023-03-27 RX ORDER — FLUTICASONE FUROATE, UMECLIDINIUM BROMIDE AND VILANTEROL TRIFENATATE 100; 62.5; 25 UG/1; UG/1; UG/1
1 POWDER RESPIRATORY (INHALATION) DAILY
Qty: 1 EACH | Refills: 0 | COMMUNITY
Start: 2023-03-27

## 2023-05-15 DIAGNOSIS — J44.9 CHRONIC OBSTRUCTIVE PULMONARY DISEASE, UNSPECIFIED COPD TYPE (HCC): ICD-10-CM

## 2023-05-15 DIAGNOSIS — J44.0 CHRONIC OBSTRUCTIVE PULMONARY DISEASE WITH ACUTE LOWER RESPIRATORY INFECTION (HCC): Primary | ICD-10-CM

## 2023-05-18 RX ORDER — FLUTICASONE FUROATE, UMECLIDINIUM BROMIDE AND VILANTEROL TRIFENATATE 100; 62.5; 25 UG/1; UG/1; UG/1
1 POWDER RESPIRATORY (INHALATION) DAILY
Qty: 2 EACH | Refills: 0 | COMMUNITY
Start: 2023-05-18

## 2023-05-23 ENCOUNTER — OFFICE VISIT (OUTPATIENT)
Dept: FAMILY MEDICINE CLINIC | Age: 63
End: 2023-05-23
Payer: COMMERCIAL

## 2023-05-23 VITALS
TEMPERATURE: 97.8 F | SYSTOLIC BLOOD PRESSURE: 120 MMHG | WEIGHT: 207 LBS | HEIGHT: 70 IN | RESPIRATION RATE: 18 BRPM | HEART RATE: 80 BPM | OXYGEN SATURATION: 96 % | DIASTOLIC BLOOD PRESSURE: 74 MMHG | BODY MASS INDEX: 29.63 KG/M2

## 2023-05-23 DIAGNOSIS — J44.9 CHRONIC OBSTRUCTIVE PULMONARY DISEASE, UNSPECIFIED COPD TYPE (HCC): ICD-10-CM

## 2023-05-23 DIAGNOSIS — L98.9 SCALP LESION: ICD-10-CM

## 2023-05-23 DIAGNOSIS — F33.2 SEVERE EPISODE OF RECURRENT MAJOR DEPRESSIVE DISORDER, WITHOUT PSYCHOTIC FEATURES (HCC): ICD-10-CM

## 2023-05-23 DIAGNOSIS — E11.65 TYPE 2 DIABETES MELLITUS WITH HYPERGLYCEMIA, WITHOUT LONG-TERM CURRENT USE OF INSULIN (HCC): Primary | ICD-10-CM

## 2023-05-23 DIAGNOSIS — E21.3 HYPERPARATHYROIDISM (HCC): ICD-10-CM

## 2023-05-23 PROCEDURE — 3051F HG A1C>EQUAL 7.0%<8.0%: CPT | Performed by: NURSE PRACTITIONER

## 2023-05-23 PROCEDURE — 99214 OFFICE O/P EST MOD 30 MIN: CPT | Performed by: NURSE PRACTITIONER

## 2023-05-23 RX ORDER — ALBUTEROL SULFATE 2.5 MG/3ML
2.5 SOLUTION RESPIRATORY (INHALATION) EVERY 6 HOURS PRN
Qty: 120 EACH | Refills: 5 | Status: SHIPPED | OUTPATIENT
Start: 2023-05-23

## 2023-05-23 SDOH — ECONOMIC STABILITY: HOUSING INSECURITY
IN THE LAST 12 MONTHS, WAS THERE A TIME WHEN YOU DID NOT HAVE A STEADY PLACE TO SLEEP OR SLEPT IN A SHELTER (INCLUDING NOW)?: NO

## 2023-05-23 SDOH — ECONOMIC STABILITY: FOOD INSECURITY: WITHIN THE PAST 12 MONTHS, YOU WORRIED THAT YOUR FOOD WOULD RUN OUT BEFORE YOU GOT MONEY TO BUY MORE.: NEVER TRUE

## 2023-05-23 SDOH — ECONOMIC STABILITY: FOOD INSECURITY: WITHIN THE PAST 12 MONTHS, THE FOOD YOU BOUGHT JUST DIDN'T LAST AND YOU DIDN'T HAVE MONEY TO GET MORE.: NEVER TRUE

## 2023-05-23 SDOH — ECONOMIC STABILITY: INCOME INSECURITY: HOW HARD IS IT FOR YOU TO PAY FOR THE VERY BASICS LIKE FOOD, HOUSING, MEDICAL CARE, AND HEATING?: NOT HARD AT ALL

## 2023-05-23 ASSESSMENT — PATIENT HEALTH QUESTIONNAIRE - PHQ9
4. FEELING TIRED OR HAVING LITTLE ENERGY: 2
SUM OF ALL RESPONSES TO PHQ QUESTIONS 1-9: 11
1. LITTLE INTEREST OR PLEASURE IN DOING THINGS: 1
9. THOUGHTS THAT YOU WOULD BE BETTER OFF DEAD, OR OF HURTING YOURSELF: 0
2. FEELING DOWN, DEPRESSED OR HOPELESS: 1
SUM OF ALL RESPONSES TO PHQ QUESTIONS 1-9: 11
6. FEELING BAD ABOUT YOURSELF - OR THAT YOU ARE A FAILURE OR HAVE LET YOURSELF OR YOUR FAMILY DOWN: 1
7. TROUBLE CONCENTRATING ON THINGS, SUCH AS READING THE NEWSPAPER OR WATCHING TELEVISION: 1
8. MOVING OR SPEAKING SO SLOWLY THAT OTHER PEOPLE COULD HAVE NOTICED. OR THE OPPOSITE, BEING SO FIGETY OR RESTLESS THAT YOU HAVE BEEN MOVING AROUND A LOT MORE THAN USUAL: 1
10. IF YOU CHECKED OFF ANY PROBLEMS, HOW DIFFICULT HAVE THESE PROBLEMS MADE IT FOR YOU TO DO YOUR WORK, TAKE CARE OF THINGS AT HOME, OR GET ALONG WITH OTHER PEOPLE: 1
SUM OF ALL RESPONSES TO PHQ9 QUESTIONS 1 & 2: 2
SUM OF ALL RESPONSES TO PHQ QUESTIONS 1-9: 11
3. TROUBLE FALLING OR STAYING ASLEEP: 3
5. POOR APPETITE OR OVEREATING: 1
SUM OF ALL RESPONSES TO PHQ QUESTIONS 1-9: 11

## 2023-05-23 ASSESSMENT — ENCOUNTER SYMPTOMS
SORE THROAT: 0
GASTROINTESTINAL NEGATIVE: 1
DIARRHEA: 0
ABDOMINAL PAIN: 0
NAUSEA: 0
VOMITING: 0
COUGH: 0
CONSTIPATION: 0
EYES NEGATIVE: 1
SHORTNESS OF BREATH: 1

## 2023-06-11 ASSESSMENT — ENCOUNTER SYMPTOMS: ROS SKIN COMMENTS: SEE HPI

## 2023-07-05 ENCOUNTER — OFFICE VISIT (OUTPATIENT)
Dept: DERMATOLOGY | Age: 63
End: 2023-07-05
Payer: COMMERCIAL

## 2023-07-05 ENCOUNTER — HOSPITAL ENCOUNTER (OUTPATIENT)
Age: 63
Discharge: HOME | End: 2023-07-05
Payer: COMMERCIAL

## 2023-07-05 VITALS
OXYGEN SATURATION: 94 % | BODY MASS INDEX: 29.7 KG/M2 | DIASTOLIC BLOOD PRESSURE: 68 MMHG | TEMPERATURE: 97.4 F | HEART RATE: 72 BPM | WEIGHT: 207 LBS | SYSTOLIC BLOOD PRESSURE: 117 MMHG

## 2023-07-05 DIAGNOSIS — L82.1 SEBORRHEIC KERATOSES: Primary | ICD-10-CM

## 2023-07-05 DIAGNOSIS — E11.65 TYPE 2 DIABETES MELLITUS WITH HYPERGLYCEMIA, WITHOUT LONG-TERM CURRENT USE OF INSULIN (HCC): ICD-10-CM

## 2023-07-05 DIAGNOSIS — E78.2 MIXED HYPERLIPIDEMIA: ICD-10-CM

## 2023-07-05 DIAGNOSIS — E55.9 VITAMIN D DEFICIENCY: ICD-10-CM

## 2023-07-05 DIAGNOSIS — E21.3 HYPERPARATHYROIDISM (HCC): ICD-10-CM

## 2023-07-05 LAB
25(OH)D3 SERPL-MCNC: 57.2 NG/ML
ALBUMIN SERPL-MCNC: 4.5 G/DL (ref 3.5–5.2)
ALBUMIN/GLOB SERPL: 2 {RATIO} (ref 1–2.5)
ALP SERPL-CCNC: 97 U/L (ref 35–104)
ALT SERPL-CCNC: 18 U/L (ref 5–33)
ANION GAP SERPL CALCULATED.3IONS-SCNC: 8 MMOL/L (ref 9–17)
AST SERPL-CCNC: 28 U/L
BASOPHILS # BLD: 0.04 K/UL (ref 0–0.2)
BASOPHILS NFR BLD: 1 % (ref 0–2)
BILIRUB SERPL-MCNC: 0.4 MG/DL (ref 0.3–1.2)
BUN SERPL-MCNC: 18 MG/DL (ref 8–23)
CA-I BLD-SCNC: 1.49 MMOL/L (ref 1.13–1.33)
CALCIUM SERPL-MCNC: 11.2 MG/DL (ref 8.6–10.4)
CHLORIDE SERPL-SCNC: 103 MMOL/L (ref 98–107)
CHOLEST SERPL-MCNC: 226 MG/DL
CHOLESTEROL/HDL RATIO: 5.1
CO2 SERPL-SCNC: 25 MMOL/L (ref 20–31)
CREAT SERPL-MCNC: 0.8 MG/DL (ref 0.5–0.9)
CREAT UR-MCNC: 79.3 MG/DL (ref 28–217)
EOSINOPHIL # BLD: 0.05 K/UL (ref 0–0.44)
EOSINOPHILS RELATIVE PERCENT: 1 % (ref 1–4)
ERYTHROCYTE [DISTWIDTH] IN BLOOD BY AUTOMATED COUNT: 12.2 % (ref 11.8–14.4)
EST. AVERAGE GLUCOSE BLD GHB EST-MCNC: 157 MG/DL
GFR SERPL CREATININE-BSD FRML MDRD: >60 ML/MIN/1.73M2
GLUCOSE SERPL-MCNC: 178 MG/DL (ref 70–99)
HBA1C MFR BLD: 7.1 % (ref 4–6)
HCT VFR BLD AUTO: 40 % (ref 36.3–47.1)
HDLC SERPL-MCNC: 44 MG/DL
HGB BLD-MCNC: 13.3 G/DL (ref 11.9–15.1)
IMM GRANULOCYTES # BLD AUTO: <0.03 K/UL (ref 0–0.3)
IMM GRANULOCYTES NFR BLD: 0 %
LDLC SERPL CALC-MCNC: 158 MG/DL (ref 0–130)
LYMPHOCYTES # BLD: 26 % (ref 24–43)
LYMPHOCYTES NFR BLD: 1.05 K/UL (ref 1.1–3.7)
MCH RBC QN AUTO: 32.4 PG (ref 25.2–33.5)
MCHC RBC AUTO-ENTMCNC: 33.3 G/DL (ref 28.4–34.8)
MCV RBC AUTO: 97.3 FL (ref 82.6–102.9)
MONOCYTES NFR BLD: 0.27 K/UL (ref 0.1–1.2)
MONOCYTES NFR BLD: 7 % (ref 3–12)
NEUTROPHILS NFR BLD: 65 % (ref 36–65)
NEUTS SEG NFR BLD: 2.65 K/UL (ref 1.5–8.1)
NRBC BLD-RTO: 0 PER 100 WBC
PLATELET # BLD AUTO: 188 K/UL (ref 138–453)
PMV BLD AUTO: 11.7 FL (ref 8.1–13.5)
POTASSIUM SERPL-SCNC: 4.6 MMOL/L (ref 3.7–5.3)
PROT SERPL-MCNC: 6.7 G/DL (ref 6.4–8.3)
PTH-INTACT SERPL-MCNC: 67.5 PG/ML (ref 14–72)
RBC # BLD AUTO: 4.11 M/UL (ref 3.95–5.11)
SODIUM SERPL-SCNC: 136 MMOL/L (ref 135–144)
TOTAL PROTEIN, URINE: 10 MG/DL
TRIGL SERPL-MCNC: 122 MG/DL
URINE TOTAL PROTEIN CREATININE RATIO: 0.13 (ref 0–0.2)
WBC OTHER # BLD: 4.1 K/UL (ref 3.5–11.3)

## 2023-07-05 PROCEDURE — 99202 OFFICE O/P NEW SF 15 MIN: CPT | Performed by: DERMATOLOGY

## 2023-07-05 PROCEDURE — 83036 HEMOGLOBIN GLYCOSYLATED A1C: CPT

## 2023-07-05 PROCEDURE — 36415 COLL VENOUS BLD VENIPUNCTURE: CPT

## 2023-07-05 PROCEDURE — 84156 ASSAY OF PROTEIN URINE: CPT

## 2023-07-05 PROCEDURE — 80053 COMPREHEN METABOLIC PANEL: CPT

## 2023-07-05 PROCEDURE — 80061 LIPID PANEL: CPT

## 2023-07-05 PROCEDURE — 83970 ASSAY OF PARATHORMONE: CPT

## 2023-07-05 PROCEDURE — 82306 VITAMIN D 25 HYDROXY: CPT

## 2023-07-05 PROCEDURE — 82570 ASSAY OF URINE CREATININE: CPT

## 2023-07-05 PROCEDURE — 82330 ASSAY OF CALCIUM: CPT

## 2023-07-05 PROCEDURE — 85027 COMPLETE CBC AUTOMATED: CPT

## 2023-07-05 RX ORDER — M-VIT,TX,IRON,MINS/CALC/FOLIC 27MG-0.4MG
1 TABLET ORAL DAILY
COMMUNITY

## 2023-07-05 NOTE — PROGRESS NOTES
Dermatology Patient Note  [address] DERMATOLOGY  [address] [place] [address] [address] [address]  Dept: [phone]  Dept Fax: [phone]      VISITDATE: 7/5/2023   REFERRING PROVIDER: ILA Shanks -*      David Grubbs is a 65 y.o. female  who presents today in the office for:    New Patient (Lesion/Growth)      HISTORY OF PRESENT ILLNESS:  Patient presents for evaluation of lesion on scalp. She admits to tenderness and inflammation of the area as she consistently bumps the area with her comb.      MEDICAL PROBLEMS:  Patient Active Problem List    Diagnosis Date Noted    Cervical cancer screening declined 03/30/2022    Hyperparathyroidism ([address]) 11/21/2021    Type 2 diabetes mellitus with hyperglycemia, without long-term current use of insulin ([address]) 06/27/2021    Major depressive disorder, recurrent, in partial remission ([address]) 10/23/2019    COPD (chronic obstructive pulmonary disease) (HCC)     Hypercalcemia 03/29/2019    Tobacco abuse     Hyperlipidemia 05/02/2018    Arthritis 03/14/2018    Major depressive disorder, recurrent episode, severe ([address]) 05/01/2017       CURRENT MEDICATIONS:   Current Outpatient Medications   Medication Sig Dispense Refill    Multiple Vitamins-Minerals (THERAPEUTIC MULTIVITAMIN-MINERALS) tablet Take 1 tablet by mouth daily      albuterol (PROVENTIL) (2.5 MG/3ML) 0.083% nebulizer solution Take 3 mLs by nebulization every 6 hours as needed for Wheezing or Shortness of Breath 120 each 5    fluticasone-umeclidin-vilant (TRELEGY ELLIPTA) 100-62.5-25 MCG/ACT AEPB inhaler Inhale 1 puff into the lungs daily 2 each 0    buPROPion (WELLBUTRIN XL) 300 MG extended release tablet Take 1 tablet by mouth every morning 28 tablet 0    metFORMIN (GLUCOPHAGE) 500 MG tablet Take 1 tablet by mouth 2 times daily (with meals) 180 tablet 1    lactobacillus (CULTURELLE) CAPS capsule Take 1 capsule by mouth daily 30 capsule 5

## 2023-07-05 NOTE — PATIENT INSTRUCTIONS
Sun Protection     There are two types of sun rays that are harmful to the skin. UVA rays cause skin aging and skin cancer, such as melanoma. UVB rays cause sunburns, cataracts, and also contribute to skin cancer. The American-Academy of Dermatology recommends that children and adults wear a broad spectrum, waterproof sunscreen with a Sun Protection Factor (SPF) of 30 or higher. It is important to check the ingredient label to be sure the sunscreen will protect the skin from both UVA and UVB sunrays. Your sunscreen should contain at least one of the following ingredients: titanium dioxide, zinc oxide, or avobenzone. Sunscreen will not be effective unless it is applied to all exposed skin. Sunscreens work best if they are applied 30 minutes before sun exposure. They should be reapplied every 2 hours and after any water exposure. Sunscreen is not perfect. It is important to use other methods to protect the skin from sun exposure also. Wear hats, sunglasses and other sun protective clothing when outdoors. Stay in the shade during the peak hours of sun exposure between 10 AM and 4 PM. , Seborrheic Keratosis  Seborrheic keratoses are common benign growths of unknown cause seen in adults due to a thickening of an area of the top skin layer. Who's At Risk  Although they can occur anytime after puberty, almost everyone over 48 has one or more of these and they increase in number with age. Some families have an inherited tendency to grow multiple lesions. Men and women are equally as likely to develop seborrheic keratoses. Dark-skinned people are less affected than those with light skin; a variant seen in blacks is called dermatosis papulosa nigra. Signs & Symptoms  One or more spots can occur anywhere on the body, except for palms, soles, and mucous membranes (eg, in the mouth or rectum). They do not go away. They do not turn into cancers, but some cancers resemble seborrheic keratosis.   They start as

## 2023-07-18 ENCOUNTER — TELEPHONE (OUTPATIENT)
Dept: FAMILY MEDICINE CLINIC | Age: 63
End: 2023-07-18

## 2023-07-18 DIAGNOSIS — E78.2 MIXED HYPERLIPIDEMIA: Primary | ICD-10-CM

## 2023-07-18 NOTE — TELEPHONE ENCOUNTER
----- Message from Pema Bentley sent at 7/18/2023 11:56 AM EDT -----  Subject: Results Request    QUESTIONS  Results: Hemoglobin A1C, Lipid Panel, CBC with Auto Differential,   Comprehensive Metabolic Panel, Vitamin D 25 Hydroxy, PTH, Intact with   Ionized Calcium, Protein / creatinine ratio, urine, ; Ordered by: Patrick Jordan   Date Performed: 2023-07-05  ---------------------------------------------------------------------------  --------------  Michael BUCKLEY    [phone]; OK to leave message on voicemail  ---------------------------------------------------------------------------  --------------

## 2023-07-19 NOTE — TELEPHONE ENCOUNTER
Overall labs look okay. I would suggest we consider something for cholesterol such as Zetia due to her myalgias from statins. More detailed information below if wanted:     Blood labs show normal urinary testing, normal vitamin D, metabolic panel that is normal except for slightly elevated calcium and elevated blood sugar 178, blood counts show 1 mild variation that is not concerning, lipid panel shows elevated total cholesterol, LDL and cholesterol to HDL ratio (we could consider starting a medication like Zetia), her A1c is 7.1 which is slightly higher than her 7.0 at last check.   Lastly her parathyroid hormone is normal but her ionized calcium is slightly elevated

## 2023-07-20 NOTE — TELEPHONE ENCOUNTER
The patient has been notified of this information and all questions answered. Patient state it is ok to start Zetia send to [facility]. Also will copies printed and mailed to address on file. Also state she does not use [facility] so she will like results given over the phone or mailed to home.

## 2023-07-20 NOTE — TELEPHONE ENCOUNTER
LVM for the patient to call the office about her lab results and starting a cholesterol medication.    The office sent a [employer] message

## 2023-07-21 RX ORDER — EZETIMIBE 10 MG/1
10 TABLET ORAL DAILY
Qty: 90 TABLET | Refills: 1 | Status: SHIPPED | OUTPATIENT
Start: 2023-07-21

## 2023-08-11 ENCOUNTER — OFFICE VISIT (OUTPATIENT)
Dept: FAMILY MEDICINE CLINIC | Age: 63
End: 2023-08-11
Payer: COMMERCIAL

## 2023-08-11 VITALS
TEMPERATURE: 97.8 F | DIASTOLIC BLOOD PRESSURE: 68 MMHG | RESPIRATION RATE: 16 BRPM | OXYGEN SATURATION: 96 % | HEART RATE: 78 BPM | HEIGHT: 70 IN | SYSTOLIC BLOOD PRESSURE: 118 MMHG | BODY MASS INDEX: 29.92 KG/M2 | WEIGHT: 209 LBS

## 2023-08-11 DIAGNOSIS — J44.9 CHRONIC OBSTRUCTIVE PULMONARY DISEASE, UNSPECIFIED COPD TYPE (HCC): Primary | ICD-10-CM

## 2023-08-11 DIAGNOSIS — E11.65 TYPE 2 DIABETES MELLITUS WITH HYPERGLYCEMIA, WITHOUT LONG-TERM CURRENT USE OF INSULIN (HCC): ICD-10-CM

## 2023-08-11 DIAGNOSIS — E78.2 MIXED HYPERLIPIDEMIA: ICD-10-CM

## 2023-08-11 DIAGNOSIS — M25.512 ACUTE PAIN OF LEFT SHOULDER: ICD-10-CM

## 2023-08-11 PROCEDURE — 99213 OFFICE O/P EST LOW 20 MIN: CPT | Performed by: NURSE PRACTITIONER

## 2023-08-11 PROCEDURE — 3051F HG A1C>EQUAL 7.0%<8.0%: CPT | Performed by: NURSE PRACTITIONER

## 2023-08-11 RX ORDER — FLUTICASONE FUROATE, UMECLIDINIUM BROMIDE AND VILANTEROL TRIFENATATE 100; 62.5; 25 UG/1; UG/1; UG/1
1 POWDER RESPIRATORY (INHALATION) DAILY
Qty: 3 EACH | Refills: 0 | Status: SHIPPED | COMMUNITY
Start: 2023-08-11

## 2023-08-11 RX ORDER — ATORVASTATIN CALCIUM 10 MG/1
TABLET, FILM COATED ORAL
COMMUNITY

## 2023-08-11 ASSESSMENT — PATIENT HEALTH QUESTIONNAIRE - PHQ9
7. TROUBLE CONCENTRATING ON THINGS, SUCH AS READING THE NEWSPAPER OR WATCHING TELEVISION: 2
SUM OF ALL RESPONSES TO PHQ QUESTIONS 1-9: 11
6. FEELING BAD ABOUT YOURSELF - OR THAT YOU ARE A FAILURE OR HAVE LET YOURSELF OR YOUR FAMILY DOWN: 1
9. THOUGHTS THAT YOU WOULD BE BETTER OFF DEAD, OR OF HURTING YOURSELF: 0
SUM OF ALL RESPONSES TO PHQ QUESTIONS 1-9: 11
5. POOR APPETITE OR OVEREATING: 2
4. FEELING TIRED OR HAVING LITTLE ENERGY: 2
2. FEELING DOWN, DEPRESSED OR HOPELESS: 1
3. TROUBLE FALLING OR STAYING ASLEEP: 2
1. LITTLE INTEREST OR PLEASURE IN DOING THINGS: 1
8. MOVING OR SPEAKING SO SLOWLY THAT OTHER PEOPLE COULD HAVE NOTICED. OR THE OPPOSITE, BEING SO FIGETY OR RESTLESS THAT YOU HAVE BEEN MOVING AROUND A LOT MORE THAN USUAL: 0
SUM OF ALL RESPONSES TO PHQ QUESTIONS 1-9: 11
SUM OF ALL RESPONSES TO PHQ QUESTIONS 1-9: 11
10. IF YOU CHECKED OFF ANY PROBLEMS, HOW DIFFICULT HAVE THESE PROBLEMS MADE IT FOR YOU TO DO YOUR WORK, TAKE CARE OF THINGS AT HOME, OR GET ALONG WITH OTHER PEOPLE: 1
SUM OF ALL RESPONSES TO PHQ9 QUESTIONS 1 & 2: 2

## 2023-08-11 ASSESSMENT — ENCOUNTER SYMPTOMS
SHORTNESS OF BREATH: 1
NAUSEA: 0
ABDOMINAL PAIN: 0
DIARRHEA: 0
COUGH: 0
VOMITING: 0
ROS SKIN COMMENTS: SEE HPI
EYES NEGATIVE: 1
CONSTIPATION: 0
GASTROINTESTINAL NEGATIVE: 1

## 2023-08-11 NOTE — PATIENT INSTRUCTIONS
New Updates for My [facility]    Thank you for choosing [facility] to give you the best care! [facility] ([facility]) is always trying to think of new ways to help their patients. We are asking all patients to try out the new digital registration that is now available through the [address] Down load today! . Via the lizbeth you're now able to update your personal and registration information prior to your upcoming appointment. This will save you time once you arrive at the office to check-in, not to mention your information remains safe!! Many other perks come from signing up for an account, such as:  Requesting refills  Scheduling an appointment  Completing an E-Visit  Sending a message to the office/provider  Having access to your medication list  Paying your bill/copay prior to your appointment  Scheduling your yearly mammogram  Review your test results    If you are not familiar the [facility] LIZBETH, please ask one of us and we will be happy to answer any questions or help you set-up your account.

## 2023-08-11 NOTE — PROGRESS NOTES
mouth daily 30 capsule 5    Handicap Placard MISC by Does not apply route Expires in 5 years ( 2025 ) 1 each 0    FLUoxetine (PROZAC) 40 MG capsule Take 2 capsules by mouth daily      aspirin 81 MG tablet Take 1 tablet by mouth daily      atorvastatin (LIPITOR) 10 MG tablet Take 1 tablet every day by oral route for 90 days. metFORMIN (GLUCOPHAGE) 500 MG tablet Take 1 tablet by mouth 2 times daily (with meals) 180 tablet 1    Cholecalciferol (VITAMIN D3) 125 MCG (5000 UT) CAPS Take 1 capsule by mouth daily 90 capsule 1     No current facility-administered medications for this visit. ALLERGIES    Allergies   Allergen Reactions    Cephalosporins Anaphylaxis    Persantine [Dipyridamole] Hives    Ceftin [Cefuroxime Axetil]      Pt unable to recall type of reaction she had from ceftin    Lipitor [Atorvastatin] Myalgia    Penicillins        PHYSICAL EXAM   Physical Exam  Vitals and nursing note reviewed. Constitutional:       General: She is not in acute distress. Appearance: She is well-developed. She is not diaphoretic. HENT:      Head: Normocephalic. Right Ear: Hearing normal.      Left Ear: Hearing normal.   Eyes:      General:         Right eye: No discharge. Left eye: No discharge. Pupils: Pupils are equal.   Cardiovascular:      Rate and Rhythm: Normal rate and regular rhythm. Pulses: Normal pulses. Radial pulses are 2+ on the right side and 2+ on the left side. Heart sounds: Normal heart sounds, S1 normal and S2 normal. No murmur heard. Pulmonary:      Effort: Pulmonary effort is normal. No respiratory distress. Breath sounds: Normal breath sounds. No wheezing. Musculoskeletal:      Left upper arm: Swelling, tenderness and bony tenderness present. Arms:       Cervical back: Normal range of motion. Comments: Limited range of motion. Skin:     General: Skin is warm and dry.    Neurological:      Mental Status: She is alert and oriented to

## 2023-11-10 ENCOUNTER — HOSPITAL ENCOUNTER (OUTPATIENT)
Age: 63
Setting detail: SPECIMEN
Discharge: HOME | End: 2023-11-10

## 2023-11-10 ENCOUNTER — OFFICE VISIT (OUTPATIENT)
Dept: FAMILY MEDICINE CLINIC | Age: 63
End: 2023-11-10

## 2023-11-10 VITALS
RESPIRATION RATE: 15 BRPM | DIASTOLIC BLOOD PRESSURE: 70 MMHG | SYSTOLIC BLOOD PRESSURE: 120 MMHG | HEIGHT: 70 IN | BODY MASS INDEX: 29.12 KG/M2 | TEMPERATURE: 98.2 F | WEIGHT: 203.4 LBS | OXYGEN SATURATION: 96 % | HEART RATE: 72 BPM

## 2023-11-10 DIAGNOSIS — Z12.31 SCREENING MAMMOGRAM FOR BREAST CANCER: ICD-10-CM

## 2023-11-10 DIAGNOSIS — E11.65 TYPE 2 DIABETES MELLITUS WITH HYPERGLYCEMIA, WITHOUT LONG-TERM CURRENT USE OF INSULIN (HCC): Primary | ICD-10-CM

## 2023-11-10 DIAGNOSIS — Z53.20 CERVICAL CANCER SCREENING DECLINED: ICD-10-CM

## 2023-11-10 DIAGNOSIS — Z23 NEED FOR INFLUENZA VACCINATION: ICD-10-CM

## 2023-11-10 DIAGNOSIS — E78.2 MIXED HYPERLIPIDEMIA: ICD-10-CM

## 2023-11-10 DIAGNOSIS — E11.65 TYPE 2 DIABETES MELLITUS WITH HYPERGLYCEMIA, WITHOUT LONG-TERM CURRENT USE OF INSULIN (HCC): ICD-10-CM

## 2023-11-10 DIAGNOSIS — J44.9 CHRONIC OBSTRUCTIVE PULMONARY DISEASE, UNSPECIFIED COPD TYPE (HCC): ICD-10-CM

## 2023-11-10 DIAGNOSIS — M25.512 ACUTE PAIN OF LEFT SHOULDER: ICD-10-CM

## 2023-11-10 DIAGNOSIS — F33.41 MAJOR DEPRESSIVE DISORDER, RECURRENT, IN PARTIAL REMISSION (HCC): ICD-10-CM

## 2023-11-10 PROBLEM — F33.2 MAJOR DEPRESSIVE DISORDER, RECURRENT EPISODE, SEVERE (HCC): Status: RESOLVED | Noted: 2017-05-01 | Resolved: 2023-11-10

## 2023-11-10 LAB
CREAT UR-MCNC: 21.5 MG/DL (ref 28–217)
HBA1C MFR BLD: 7.4 %
MICROALBUMIN UR-MCNC: 42 MG/L
MICROALBUMIN/CREAT UR-RTO: 195 MCG/MG CREAT

## 2023-11-10 RX ORDER — LIDOCAINE 4 G/G
PATCH TOPICAL
Qty: 60 PATCH | Refills: 5 | Status: SHIPPED | OUTPATIENT
Start: 2023-11-10

## 2023-11-10 RX ORDER — FLUTICASONE FUROATE, UMECLIDINIUM BROMIDE AND VILANTEROL TRIFENATATE 100; 62.5; 25 UG/1; UG/1; UG/1
1 POWDER RESPIRATORY (INHALATION) DAILY
Qty: 2 EACH | Refills: 0 | COMMUNITY
Start: 2023-11-10

## 2023-11-10 ASSESSMENT — ENCOUNTER SYMPTOMS
NAUSEA: 0
SHORTNESS OF BREATH: 1
CONSTIPATION: 0
VOMITING: 0
DIARRHEA: 0
ABDOMINAL PAIN: 0
COUGH: 0
EYES NEGATIVE: 1
GASTROINTESTINAL NEGATIVE: 1
ROS SKIN COMMENTS: SEE HPI

## 2023-11-10 ASSESSMENT — PATIENT HEALTH QUESTIONNAIRE - PHQ9
SUM OF ALL RESPONSES TO PHQ QUESTIONS 1-9: 15
4. FEELING TIRED OR HAVING LITTLE ENERGY: 2
SUM OF ALL RESPONSES TO PHQ QUESTIONS 1-9: 14
9. THOUGHTS THAT YOU WOULD BE BETTER OFF DEAD, OR OF HURTING YOURSELF: 1
2. FEELING DOWN, DEPRESSED OR HOPELESS: 2
5. POOR APPETITE OR OVEREATING: 1
7. TROUBLE CONCENTRATING ON THINGS, SUCH AS READING THE NEWSPAPER OR WATCHING TELEVISION: 2
SUM OF ALL RESPONSES TO PHQ QUESTIONS 1-9: 15
SUM OF ALL RESPONSES TO PHQ QUESTIONS 1-9: 15
1. LITTLE INTEREST OR PLEASURE IN DOING THINGS: 2
8. MOVING OR SPEAKING SO SLOWLY THAT OTHER PEOPLE COULD HAVE NOTICED. OR THE OPPOSITE, BEING SO FIGETY OR RESTLESS THAT YOU HAVE BEEN MOVING AROUND A LOT MORE THAN USUAL: 1
3. TROUBLE FALLING OR STAYING ASLEEP: 2
6. FEELING BAD ABOUT YOURSELF - OR THAT YOU ARE A FAILURE OR HAVE LET YOURSELF OR YOUR FAMILY DOWN: 2
SUM OF ALL RESPONSES TO PHQ9 QUESTIONS 1 & 2: 4
10. IF YOU CHECKED OFF ANY PROBLEMS, HOW DIFFICULT HAVE THESE PROBLEMS MADE IT FOR YOU TO DO YOUR WORK, TAKE CARE OF THINGS AT HOME, OR GET ALONG WITH OTHER PEOPLE: 1

## 2023-11-10 ASSESSMENT — COLUMBIA-SUICIDE SEVERITY RATING SCALE - C-SSRS
5. HAVE YOU STARTED TO WORK OUT OR WORKED OUT THE DETAILS OF HOW TO KILL YOURSELF? DO YOU INTEND TO CARRY OUT THIS PLAN?: NO
4. HAVE YOU HAD THESE THOUGHTS AND HAD SOME INTENTION OF ACTING ON THEM?: NO
6. HAVE YOU EVER DONE ANYTHING, STARTED TO DO ANYTHING, OR PREPARED TO DO ANYTHING TO END YOUR LIFE?: NO
7. DID THIS OCCUR IN THE LAST THREE MONTHS: NO
1. WITHIN THE PAST MONTH, HAVE YOU WISHED YOU WERE DEAD OR WISHED YOU COULD GO TO SLEEP AND NOT WAKE UP?: YES
2. HAVE YOU ACTUALLY HAD ANY THOUGHTS OF KILLING YOURSELF?: NO
BASED ON RESPONSES TO C-SSRS QS 1-6, WHAT IS THE PATIENT'S OVERALL RISK RATING FOR SUICIDE: LOW RISK
3. HAVE YOU BEEN THINKING ABOUT HOW YOU MIGHT KILL YOURSELF?: NO

## 2023-11-10 NOTE — PROGRESS NOTES
Vaccine Information Sheet, \"Influenza - Inactivated\"  given to Rosy Hickman, or parent/legal guardian of  Rosy Hickman and verbalized understanding. Patient responses:    Have you ever had a reaction to a flu vaccine? No  Are you able to eat eggs without adverse effects? No  Do you have any current illness? No  Have you ever had Guillian Phoenix Syndrome? No    Flu vaccine given per order. Please see immunization tab.

## 2023-11-10 NOTE — ASSESSMENT & PLAN NOTE
At goal, continue current medications, continue current treatment plan, lifestyle modifications recommended and A1c higher than last check. Patient feels this is attributed to high stress and eating out more.

## 2023-11-10 NOTE — PATIENT INSTRUCTIONS
New Updates for My [facility] LIZ[facility]ETH    Thank you for choosing [facility] to give you the best care! [facility] ([facility]) is always trying to think of new ways to help their patients. We are asking all patients to try out the new digital registration that is now available through the [address] Down load today! . Via the lizbeth you're now able to update your personal and registration information prior to your upcoming appointment. This will save you time once you arrive at the office to check-in, not to mention your information remains safe!! Many other perks come from signing up for an account, such as:  Requesting refills  Scheduling an appointment  Completing an E-Visit  Sending a message to the office/provider  Having access to your medication list  Paying your bill/copay prior to your appointment  Scheduling your yearly mammogram  Review your test results    If you are not familiar the [facility] LIZBETH, please ask one of us and we will be happy to answer any questions or help you set-up your account.

## 2023-11-10 NOTE — ASSESSMENT & PLAN NOTE
Monitored by specialist- no acute findings meriting change in the plan.   Higher levels on PHQ-9 appear related to situational stress from sister's upcoming biopsy and sister-in-law being on transplant list.

## 2024-02-03 DIAGNOSIS — E11.65 TYPE 2 DIABETES MELLITUS WITH HYPERGLYCEMIA, WITHOUT LONG-TERM CURRENT USE OF INSULIN (HCC): ICD-10-CM

## 2024-03-25 ENCOUNTER — TELEPHONE (OUTPATIENT)
Dept: FAMILY MEDICINE CLINIC | Age: 64
End: 2024-03-25

## 2024-03-25 ENCOUNTER — OFFICE VISIT (OUTPATIENT)
Dept: FAMILY MEDICINE CLINIC | Age: 64
End: 2024-03-25

## 2024-03-25 VITALS
SYSTOLIC BLOOD PRESSURE: 118 MMHG | OXYGEN SATURATION: 97 % | TEMPERATURE: 97.8 F | DIASTOLIC BLOOD PRESSURE: 74 MMHG | RESPIRATION RATE: 17 BRPM | BODY MASS INDEX: 28.75 KG/M2 | HEART RATE: 90 BPM | WEIGHT: 200.8 LBS | HEIGHT: 70 IN

## 2024-03-25 DIAGNOSIS — E83.52 HYPERCALCEMIA: ICD-10-CM

## 2024-03-25 DIAGNOSIS — Z87.891 PERSONAL HISTORY OF TOBACCO USE: ICD-10-CM

## 2024-03-25 DIAGNOSIS — E11.65 TYPE 2 DIABETES MELLITUS WITH HYPERGLYCEMIA, WITHOUT LONG-TERM CURRENT USE OF INSULIN (HCC): Primary | ICD-10-CM

## 2024-03-25 DIAGNOSIS — E21.3 HYPERPARATHYROIDISM (HCC): ICD-10-CM

## 2024-03-25 DIAGNOSIS — J44.9 CHRONIC OBSTRUCTIVE PULMONARY DISEASE, UNSPECIFIED COPD TYPE (HCC): ICD-10-CM

## 2024-03-25 DIAGNOSIS — B02.9 HERPES ZOSTER WITHOUT COMPLICATION: ICD-10-CM

## 2024-03-25 LAB — HBA1C MFR BLD: 7.6 %

## 2024-03-25 RX ORDER — VALACYCLOVIR HYDROCHLORIDE 1 G/1
1000 TABLET, FILM COATED ORAL 3 TIMES DAILY
Qty: 21 TABLET | Refills: 0 | Status: SHIPPED | OUTPATIENT
Start: 2024-03-25 | End: 2024-04-01

## 2024-03-25 RX ORDER — FLUTICASONE FUROATE, UMECLIDINIUM BROMIDE AND VILANTEROL TRIFENATATE 100; 62.5; 25 UG/1; UG/1; UG/1
1 POWDER RESPIRATORY (INHALATION) DAILY
Qty: 2 EACH | Refills: 0 | Status: SHIPPED | COMMUNITY
Start: 2024-03-25

## 2024-03-25 ASSESSMENT — PATIENT HEALTH QUESTIONNAIRE - PHQ9
5. POOR APPETITE OR OVEREATING: MORE THAN HALF THE DAYS
SUM OF ALL RESPONSES TO PHQ QUESTIONS 1-9: 11
7. TROUBLE CONCENTRATING ON THINGS, SUCH AS READING THE NEWSPAPER OR WATCHING TELEVISION: MORE THAN HALF THE DAYS
1. LITTLE INTEREST OR PLEASURE IN DOING THINGS: SEVERAL DAYS
2. FEELING DOWN, DEPRESSED OR HOPELESS: SEVERAL DAYS
SUM OF ALL RESPONSES TO PHQ QUESTIONS 1-9: 11
6. FEELING BAD ABOUT YOURSELF - OR THAT YOU ARE A FAILURE OR HAVE LET YOURSELF OR YOUR FAMILY DOWN: SEVERAL DAYS
3. TROUBLE FALLING OR STAYING ASLEEP: MORE THAN HALF THE DAYS
4. FEELING TIRED OR HAVING LITTLE ENERGY: SEVERAL DAYS
SUM OF ALL RESPONSES TO PHQ QUESTIONS 1-9: 11
10. IF YOU CHECKED OFF ANY PROBLEMS, HOW DIFFICULT HAVE THESE PROBLEMS MADE IT FOR YOU TO DO YOUR WORK, TAKE CARE OF THINGS AT HOME, OR GET ALONG WITH OTHER PEOPLE: SOMEWHAT DIFFICULT
SUM OF ALL RESPONSES TO PHQ QUESTIONS 1-9: 11
SUM OF ALL RESPONSES TO PHQ9 QUESTIONS 1 & 2: 2
8. MOVING OR SPEAKING SO SLOWLY THAT OTHER PEOPLE COULD HAVE NOTICED. OR THE OPPOSITE, BEING SO FIGETY OR RESTLESS THAT YOU HAVE BEEN MOVING AROUND A LOT MORE THAN USUAL: SEVERAL DAYS
9. THOUGHTS THAT YOU WOULD BE BETTER OFF DEAD, OR OF HURTING YOURSELF: NOT AT ALL

## 2024-03-25 ASSESSMENT — ENCOUNTER SYMPTOMS
VOMITING: 0
GASTROINTESTINAL NEGATIVE: 1
CONSTIPATION: 0
ABDOMINAL PAIN: 0
DIARRHEA: 0
NAUSEA: 0
EYES NEGATIVE: 1
ROS SKIN COMMENTS: SEE HPI
COUGH: 0
SHORTNESS OF BREATH: 1

## 2024-03-25 NOTE — PROGRESS NOTES
Discussed with the patient the current USPSTF guidelines released March 9, 2021 for screening for lung cancer.    For adults aged 50 to 80 years who have a 20 pack-year smoking history and currently smoke or have quit within the past 15 years the grade B recommendation is to:  Screen for lung cancer with low-dose computed tomography (LDCT) every year.  Stop screening once a person has not smoked for 15 years or has a health problem that limits life expectancy or the ability to have lung surgery.    The patient  reports that she has been smoking cigarettes. She started smoking about 43 years ago. She has a 21.7 pack-year smoking history. She has never used smokeless tobacco.. Discussed with patient the risks and benefits of screening, including over-diagnosis, false positive rate, and total radiation exposure.  The patient currently exhibits no signs or symptoms suggestive of lung cancer.  Discussed with patient the importance of compliance with yearly annual lung cancer screenings and willingness to undergo diagnosis and treatment if screening scan is positive.  In addition, the patient was counseled regarding the importance of remaining smoke free and/or total smoking cessation.    Also reviewed the following if the patient has Medicare that as of February 10, 2022, Medicare only covers LDCT screening in patients aged 50-77 with at least a 20 pack-year smoking history who currently smoke or have quit in the last 15 years  
continue current treatment plan.  Will continue to monitor as patient no longer following with endocrine  Orders:  -     DEXA BONE DENSITY AXIAL SKELETON; Future  3. Hypercalcemia  -     DEXA BONE DENSITY AXIAL SKELETON; Future  4. Chronic obstructive pulmonary disease, unspecified COPD type (HCC)  Assessment & Plan:   Well-controlled, continue current medications, continue current treatment plan and medication adherence emphasized  Orders:  -     fluticasone-umeclidin-vilant (TRELEGY ELLIPTA) 100-62.5-25 MCG/ACT AEPB inhaler; Inhale 1 puff into the lungs daily, Disp-2 each, R-0Lot #: [id number] Exp: 06/30/2025 NDC: 4779-2955-46 Manu:GSKSample  5. Herpes zoster without complication  -     valACYclovir (VALTREX) 1 g tablet; Take 1 tablet by mouth 3 times daily for 7 days, Disp-21 tablet, R-0Normal  6. Personal history of tobacco use  -     CT LUNG SCREENING; Future     Metformin 500 mg written twice daily. Only taken once daily.  Advised patient to increase to twice daily given increase in A1c.  Valtrex ordered for shingles.  Education provided regarding shingles, treatment and expected course.  Discussed recommendation to get shingles vaccine once rash clears as she could get shingles again.  DEXA bone density ordered and CT lung screening.    Doreen received counseling on the following healthy behaviors: nutrition, exercise, medication adherence, and tobacco cessation  Reviewed prior labs and health maintenance  Continue current medications, diet and exercise.  Discussed use, benefit, and side effects of prescribed medications. Barriers to medication compliance addressed.   Patient given educational materials - see patient instructions  Was a self-tracking handout given in paper form or via [employer]? Yes    Requested Prescriptions     Signed Prescriptions Disp Refills    fluticasone-umeclidin-vilant (TRELEGY ELLIPTA) 100-62.5-25 MCG/ACT AEPB inhaler 2 each 0     Sig: Inhale 1 puff into the lungs daily    valACYclovir

## 2024-03-25 NOTE — TELEPHONE ENCOUNTER
Patient called in stating she feels she may have shingles and she is wanting to know if she should keep her appt Wednesday or can she come in today.      Please advise

## 2024-03-25 NOTE — PATIENT INSTRUCTIONS
Thank you for choosing [email or web address].  We know you have options when it comes to your healthcare; we appreciate that you chose us. Our goal is to provide exceptional  service and world class care to every patient.  You will be receiving a survey via email or text message asking for your feedback.  Please take a few minutes to share your thoughts about your recent visit. Your comments helps us understand what we do well and ways we can improve.  Thank you in advance for your valuable feedback.                New Updates for [employer]    Thank you for choosing [facility] to give you the best care! [email or web address] is always trying to think of new ways to help their patients. We are asking all patients to try out the new digital registration that is now available through the [employer]. Down load today!. Via the aarti you're now able to update your personal and registration information prior to your upcoming appointment. This will save you time once you arrive at the office to check-in, not to mention your information remains safe!! Many other perks come from signing up for an account, such as:  Requesting refills  Scheduling an appointment  Completing an E-Visit  Sending a message to the office/provider  Having access to your medication list  Paying your bill/copay prior to your appointment  Scheduling your yearly mammogram  Review your test results    If you are not familiar the [employer], please ask one of us and we will be happy to answer any questions or help you set-up your account.             Learning About Lung Cancer Screening  What is screening for lung cancer?     Lung cancer screening is a way to find some lung cancers early, before a person has any symptoms of the cancer.  Lung cancer screening may help those who have the highest risk for lung cancer--people age 50 and older who are or were heavy smokers. For most people, who aren't at increased risk, screening for

## 2024-05-07 ENCOUNTER — TELEPHONE (OUTPATIENT)
Dept: FAMILY MEDICINE CLINIC | Age: 64
End: 2024-05-07

## 2024-05-07 NOTE — TELEPHONE ENCOUNTER
Mailed reminder letter and copy of referral to Doreen to call and schedule her CT Lung screening, Dexa Scan and Mammogram

## 2024-05-09 ENCOUNTER — TELEPHONE (OUTPATIENT)
Dept: FAMILY MEDICINE CLINIC | Age: 64
End: 2024-05-09

## 2024-05-09 DIAGNOSIS — J44.9 CHRONIC OBSTRUCTIVE PULMONARY DISEASE, UNSPECIFIED COPD TYPE (HCC): Primary | ICD-10-CM

## 2024-05-09 RX ORDER — FLUTICASONE FUROATE, UMECLIDINIUM BROMIDE AND VILANTEROL TRIFENATATE 100; 62.5; 25 UG/1; UG/1; UG/1
1 POWDER RESPIRATORY (INHALATION) DAILY
Qty: 2 EACH | Refills: 0 | COMMUNITY
Start: 2024-05-09

## 2024-06-25 ENCOUNTER — HOSPITAL ENCOUNTER (OUTPATIENT)
Dept: CT IMAGING | Age: 64
Discharge: HOME | End: 2024-06-27
Payer: COMMERCIAL

## 2024-06-25 ENCOUNTER — HOSPITAL ENCOUNTER (OUTPATIENT)
Dept: MAMMOGRAPHY | Age: 64
Discharge: HOME | End: 2024-06-27
Payer: COMMERCIAL

## 2024-06-25 VITALS — BODY MASS INDEX: 28.63 KG/M2 | WEIGHT: 200 LBS | HEIGHT: 70 IN

## 2024-06-25 DIAGNOSIS — E21.3 HYPERPARATHYROIDISM (HCC): ICD-10-CM

## 2024-06-25 DIAGNOSIS — E83.52 HYPERCALCEMIA: ICD-10-CM

## 2024-06-25 DIAGNOSIS — Z87.891 PERSONAL HISTORY OF TOBACCO USE: ICD-10-CM

## 2024-06-25 DIAGNOSIS — Z12.31 SCREENING MAMMOGRAM FOR BREAST CANCER: ICD-10-CM

## 2024-06-25 PROCEDURE — 77063 BREAST TOMOSYNTHESIS BI: CPT

## 2024-06-25 PROCEDURE — 77080 DXA BONE DENSITY AXIAL: CPT

## 2024-06-25 PROCEDURE — 71271 CT THORAX LUNG CANCER SCR C-: CPT

## 2024-07-09 ENCOUNTER — TELEPHONE (OUTPATIENT)
Dept: FAMILY MEDICINE CLINIC | Age: 64
End: 2024-07-09

## 2024-07-09 NOTE — TELEPHONE ENCOUNTER
Please call patient and inform her of her normal mammogram and CT lung screening.    Her bone density shows osteopenia as it has in the past.  Continue vitamin D, calcium and regular weightbearing activities.

## 2024-07-26 ENCOUNTER — OFFICE VISIT (OUTPATIENT)
Dept: FAMILY MEDICINE CLINIC | Age: 64
End: 2024-07-26
Payer: COMMERCIAL

## 2024-07-26 VITALS
WEIGHT: 197 LBS | HEART RATE: 71 BPM | OXYGEN SATURATION: 97 % | SYSTOLIC BLOOD PRESSURE: 116 MMHG | TEMPERATURE: 97.8 F | HEIGHT: 70 IN | RESPIRATION RATE: 15 BRPM | DIASTOLIC BLOOD PRESSURE: 74 MMHG | BODY MASS INDEX: 28.2 KG/M2

## 2024-07-26 DIAGNOSIS — E11.65 TYPE 2 DIABETES MELLITUS WITH HYPERGLYCEMIA, WITHOUT LONG-TERM CURRENT USE OF INSULIN (HCC): ICD-10-CM

## 2024-07-26 DIAGNOSIS — H91.91 HEARING LOSS OF RIGHT EAR, UNSPECIFIED HEARING LOSS TYPE: ICD-10-CM

## 2024-07-26 DIAGNOSIS — E21.3 HYPERPARATHYROIDISM (HCC): ICD-10-CM

## 2024-07-26 DIAGNOSIS — J44.9 CHRONIC OBSTRUCTIVE PULMONARY DISEASE, UNSPECIFIED COPD TYPE (HCC): ICD-10-CM

## 2024-07-26 DIAGNOSIS — L29.9 EAR ITCHING: ICD-10-CM

## 2024-07-26 DIAGNOSIS — Z00.00 MEDICARE ANNUAL WELLNESS VISIT, SUBSEQUENT: Primary | ICD-10-CM

## 2024-07-26 DIAGNOSIS — L08.9 SOFT TISSUE INFECTION: ICD-10-CM

## 2024-07-26 LAB — HBA1C MFR BLD: 6.4 %

## 2024-07-26 PROCEDURE — 3044F HG A1C LEVEL LT 7.0%: CPT | Performed by: NURSE PRACTITIONER

## 2024-07-26 PROCEDURE — 83036 HEMOGLOBIN GLYCOSYLATED A1C: CPT | Performed by: NURSE PRACTITIONER

## 2024-07-26 PROCEDURE — G0439 PPPS, SUBSEQ VISIT: HCPCS | Performed by: NURSE PRACTITIONER

## 2024-07-26 RX ORDER — FLUTICASONE FUROATE, UMECLIDINIUM BROMIDE AND VILANTEROL TRIFENATATE 100; 62.5; 25 UG/1; UG/1; UG/1
1 POWDER RESPIRATORY (INHALATION) DAILY
Qty: 60 EACH | Refills: 1 | Status: SHIPPED | OUTPATIENT
Start: 2024-07-26

## 2024-07-26 RX ORDER — TRIAMCINOLONE ACETONIDE 0.25 MG/G
CREAM TOPICAL
Qty: 15 G | Refills: 1 | Status: SHIPPED | OUTPATIENT
Start: 2024-07-26

## 2024-07-26 RX ORDER — ALBUTEROL SULFATE 2.5 MG/3ML
2.5 SOLUTION RESPIRATORY (INHALATION) EVERY 6 HOURS PRN
Qty: 120 EACH | Refills: 5 | Status: SHIPPED | OUTPATIENT
Start: 2024-07-26

## 2024-07-26 SDOH — ECONOMIC STABILITY: FOOD INSECURITY: WITHIN THE PAST 12 MONTHS, YOU WORRIED THAT YOUR FOOD WOULD RUN OUT BEFORE YOU GOT MONEY TO BUY MORE.: NEVER TRUE

## 2024-07-26 SDOH — ECONOMIC STABILITY: FOOD INSECURITY: WITHIN THE PAST 12 MONTHS, THE FOOD YOU BOUGHT JUST DIDN'T LAST AND YOU DIDN'T HAVE MONEY TO GET MORE.: NEVER TRUE

## 2024-07-26 SDOH — ECONOMIC STABILITY: INCOME INSECURITY: HOW HARD IS IT FOR YOU TO PAY FOR THE VERY BASICS LIKE FOOD, HOUSING, MEDICAL CARE, AND HEATING?: NOT HARD AT ALL

## 2024-07-26 ASSESSMENT — PATIENT HEALTH QUESTIONNAIRE - PHQ9
SUM OF ALL RESPONSES TO PHQ QUESTIONS 1-9: 14
3. TROUBLE FALLING OR STAYING ASLEEP: NEARLY EVERY DAY
8. MOVING OR SPEAKING SO SLOWLY THAT OTHER PEOPLE COULD HAVE NOTICED. OR THE OPPOSITE, BEING SO FIGETY OR RESTLESS THAT YOU HAVE BEEN MOVING AROUND A LOT MORE THAN USUAL: NOT AT ALL
9. THOUGHTS THAT YOU WOULD BE BETTER OFF DEAD, OR OF HURTING YOURSELF: NOT AT ALL
7. TROUBLE CONCENTRATING ON THINGS, SUCH AS READING THE NEWSPAPER OR WATCHING TELEVISION: MORE THAN HALF THE DAYS
SUM OF ALL RESPONSES TO PHQ QUESTIONS 1-9: 14
SUM OF ALL RESPONSES TO PHQ QUESTIONS 1-9: 14
SUM OF ALL RESPONSES TO PHQ9 QUESTIONS 1 & 2: 3
6. FEELING BAD ABOUT YOURSELF - OR THAT YOU ARE A FAILURE OR HAVE LET YOURSELF OR YOUR FAMILY DOWN: MORE THAN HALF THE DAYS
5. POOR APPETITE OR OVEREATING: MORE THAN HALF THE DAYS
1. LITTLE INTEREST OR PLEASURE IN DOING THINGS: MORE THAN HALF THE DAYS
10. IF YOU CHECKED OFF ANY PROBLEMS, HOW DIFFICULT HAVE THESE PROBLEMS MADE IT FOR YOU TO DO YOUR WORK, TAKE CARE OF THINGS AT HOME, OR GET ALONG WITH OTHER PEOPLE: SOMEWHAT DIFFICULT
4. FEELING TIRED OR HAVING LITTLE ENERGY: MORE THAN HALF THE DAYS
2. FEELING DOWN, DEPRESSED OR HOPELESS: SEVERAL DAYS
SUM OF ALL RESPONSES TO PHQ QUESTIONS 1-9: 14

## 2024-07-26 ASSESSMENT — LIFESTYLE VARIABLES
HOW OFTEN DO YOU HAVE A DRINK CONTAINING ALCOHOL: NEVER
HOW MANY STANDARD DRINKS CONTAINING ALCOHOL DO YOU HAVE ON A TYPICAL DAY: PATIENT DOES NOT DRINK

## 2024-07-26 NOTE — PATIENT INSTRUCTIONS
aspirin. Wait for an ambulance. Do not try to drive yourself.  Watch closely for changes in your health, and be sure to contact your doctor if you have any problems.  Where can you learn more?  Go to https://www.[employer].net/patientEd and enter F075 to learn more about \"A Healthy Heart: Care Instructions.\"  Current as of: June 24, 2023  Content Version: 14.1  © 9297-8497 [email or web address].   Care instructions adapted under license by [employer]. If you have questions about a medical condition or this instruction, always ask your healthcare professional. [email or web address] disclaims any warranty or liability for your use of this information.      Personalized Preventive Plan for Doreen Boogie - 7/26/2024  Medicare offers a range of preventive health benefits. Some of the tests and screenings are paid in full while other may be subject to a deductible, co-insurance, and/or copay.    Some of these benefits include a comprehensive review of your medical history including lifestyle, illnesses that may run in your family, and various assessments and screenings as appropriate.    After reviewing your medical record and screening and assessments performed today your provider may have ordered immunizations, labs, imaging, and/or referrals for you.  A list of these orders (if applicable) as well as your Preventive Care list are included within your After Visit Summary for your review.    Other Preventive Recommendations:    A preventive eye exam performed by an eye specialist is recommended every 1-2 years to screen for glaucoma; cataracts, macular degeneration, and other eye disorders.  A preventive dental visit is recommended every 6 months.  Try to get at least 150 minutes of exercise per week or 10,000 steps per day on a pedometer .  Order or download the FREE \"Exercise & Physical Activity: Your Everyday Guide\" from [employer]. Call 1-418.598.1410 or search [employer] on

## 2024-07-26 NOTE — PROGRESS NOTES
frequently  See AVS for additional education material     Dentist Screen:  Have you seen the dentist within the past year?: (!) No    Intervention:  Patient comments: I'm looking into dental coverage     Hearing Screen:  Do you or your family notice any trouble with your hearing that hasn't been managed with hearing aids?: (!) Yes    Interventions:  Patient comments: Let's talk next time    Vision Screen:  Do you have difficulty driving, watching TV, or doing any of your daily activities because of your eyesight?: (!) Yes  Have you had an eye exam within the past year?: (!) No  Interventions:   Patient encouraged to make appointment with their eye specialist      Advanced Directives:  Do you have a Living Will?: (!) No    Intervention:  has NO advanced directive - information provided      Tobacco Use:    Tobacco Use      Smoking status: Every Day        Packs/day: 0.50        Years: 0.5 packs/day for 43.8 years (21.9 ttl pk-yrs)        Types: Cigarettes        Start date: 9/30/1980      Smokeless tobacco: Never     Interventions:  See AVS for additional education material            Objective   Vitals:    07/26/24 1304   BP: 116/74   Site: Left Upper Arm   Position: Sitting   Cuff Size: Large Adult   Pulse: 71   Resp: 15   Temp: 97.8 °F (36.6 °C)   SpO2: 97%   Weight: 89.4 kg (197 lb)   Height: 1.778 m (5' 10\")      Body mass index is 28.27 kg/m².        General Appearance: alert and oriented to person, place and time, well-developed and well-nourished, in no acute distress; obesity  Skin: warm and dry, no rash or erythema  Head: normocephalic and atraumatic  Eyes: pupils equal, round, and reactive to light, extraocular eye movements intact, conjunctivae normal  ENT: tympanic membrane, external ear and ear canal normal bilaterally, oropharynx clear and moist with normal mucous membranes, hearing abnormal- right ear canal flaky rash with mild erythema, and dentures   Neck: neck supple and non tender without mass, no

## 2024-09-02 NOTE — TELEPHONE ENCOUNTER
L/m for patient to discuss her labs and need for referral to endocrine. Orders to be mailed with lab letter. Patient's calcium level was elevated and so was the parathyroid level so we would like her to see endocrinology. Pt presents with concern for SOB. Pt has ALS. Gasping breathes upon arrival. Woke up reporting he needs oxygen. At baseline pt not able to clearly verbalize.      Triage Assessment (Adult)       Row Name 09/02/24 [facility]          Triage Assessment    Airway WDL WDL        Respiratory WDL    Respiratory WDL X;all     Rhythm/Pattern, Respiratory shortness of breath        Cognitive/Neuro/Behavioral WDL    Cognitive/Neuro/Behavioral WDL WDL

## 2024-10-21 ENCOUNTER — TELEPHONE (OUTPATIENT)
Dept: FAMILY MEDICINE CLINIC | Age: 64
End: 2024-10-21

## 2024-10-21 DIAGNOSIS — J44.9 CHRONIC OBSTRUCTIVE PULMONARY DISEASE, UNSPECIFIED COPD TYPE (HCC): ICD-10-CM

## 2024-10-21 RX ORDER — FLUTICASONE FUROATE, UMECLIDINIUM BROMIDE AND VILANTEROL TRIFENATATE 100; 62.5; 25 UG/1; UG/1; UG/1
1 POWDER RESPIRATORY (INHALATION) DAILY
Qty: 14 EACH | Refills: 0 | Status: SHIPPED | COMMUNITY
Start: 2024-10-21

## 2024-11-13 ENCOUNTER — HOSPITAL ENCOUNTER (OUTPATIENT)
Age: 64
Discharge: HOME | End: 2024-11-13
Payer: COMMERCIAL

## 2024-11-13 LAB
ALBUMIN SERPL-MCNC: 4.8 G/DL (ref 3.5–5.2)
ALBUMIN/GLOB SERPL: 2.3 {RATIO} (ref 1–2.5)
ALP SERPL-CCNC: 102 U/L (ref 35–104)
ALT SERPL-CCNC: 21 U/L (ref 10–35)
ANION GAP SERPL CALCULATED.3IONS-SCNC: 10 MMOL/L (ref 9–16)
AST SERPL-CCNC: 27 U/L (ref 10–35)
BASOPHILS # BLD: 0.05 K/UL (ref 0–0.2)
BASOPHILS NFR BLD: 1 % (ref 0–2)
BILIRUB SERPL-MCNC: 0.3 MG/DL (ref 0–1.2)
BUN SERPL-MCNC: 17 MG/DL (ref 8–23)
CALCIUM SERPL-MCNC: 11.6 MG/DL (ref 8.6–10.4)
CHLORIDE SERPL-SCNC: 104 MMOL/L (ref 98–107)
CHOLEST SERPL-MCNC: 210 MG/DL (ref 0–199)
CHOLEST SERPL-MCNC: 213 MG/DL (ref 0–199)
CHOLESTEROL/HDL RATIO: 4.3
CHOLESTEROL/HDL RATIO: 4.3
CO2 SERPL-SCNC: 27 MMOL/L (ref 20–31)
CREAT SERPL-MCNC: 0.9 MG/DL (ref 0.6–0.9)
EOSINOPHIL # BLD: 0.04 K/UL (ref 0–0.44)
EOSINOPHILS RELATIVE PERCENT: 1 % (ref 1–4)
ERYTHROCYTE [DISTWIDTH] IN BLOOD BY AUTOMATED COUNT: 11.9 % (ref 11.8–14.4)
EST. AVERAGE GLUCOSE BLD GHB EST-MCNC: 148 MG/DL
GFR, ESTIMATED: 72 ML/MIN/1.73M2
GLUCOSE SERPL-MCNC: 185 MG/DL (ref 74–99)
HBA1C MFR BLD: 6.8 % (ref 4–6)
HCT VFR BLD AUTO: 43.7 % (ref 36.3–47.1)
HDLC SERPL-MCNC: 49 MG/DL
HDLC SERPL-MCNC: 49 MG/DL
HGB BLD-MCNC: 14 G/DL (ref 11.9–15.1)
IMM GRANULOCYTES # BLD AUTO: <0.03 K/UL (ref 0–0.3)
IMM GRANULOCYTES NFR BLD: 0 %
LDLC SERPL CALC-MCNC: 135 MG/DL (ref 0–100)
LDLC SERPL CALC-MCNC: 137 MG/DL (ref 0–100)
LYMPHOCYTES NFR BLD: 1.12 K/UL (ref 1.1–3.7)
LYMPHOCYTES RELATIVE PERCENT: 28 % (ref 24–43)
MCH RBC QN AUTO: 32 PG (ref 25.2–33.5)
MCHC RBC AUTO-ENTMCNC: 32 G/DL (ref 28.4–34.8)
MCV RBC AUTO: 100 FL (ref 82.6–102.9)
MONOCYTES NFR BLD: 0.29 K/UL (ref 0.1–1.2)
MONOCYTES NFR BLD: 7 % (ref 3–12)
NEUTROPHILS NFR BLD: 63 % (ref 36–65)
NEUTS SEG NFR BLD: 2.49 K/UL (ref 1.5–8.1)
NRBC BLD-RTO: 0 PER 100 WBC
PLATELET # BLD AUTO: 207 K/UL (ref 138–453)
PMV BLD AUTO: 11.4 FL (ref 8.1–13.5)
POTASSIUM SERPL-SCNC: 4.8 MMOL/L (ref 3.7–5.3)
PROT SERPL-MCNC: 6.9 G/DL (ref 6.6–8.7)
PTH-INTACT SERPL-MCNC: 67 PG/ML (ref 15–65)
RBC # BLD AUTO: 4.37 M/UL (ref 3.95–5.11)
SODIUM SERPL-SCNC: 141 MMOL/L (ref 136–145)
TRIGL SERPL-MCNC: 131 MG/DL
TRIGL SERPL-MCNC: 135 MG/DL
TSH SERPL DL<=0.05 MIU/L-ACNC: 1.36 UIU/ML (ref 0.27–4.2)
VLDLC SERPL CALC-MCNC: 26 MG/DL (ref 1–30)
VLDLC SERPL CALC-MCNC: 27 MG/DL (ref 1–30)
WBC OTHER # BLD: 4 K/UL (ref 3.5–11.3)

## 2024-11-13 PROCEDURE — 36415 COLL VENOUS BLD VENIPUNCTURE: CPT

## 2024-11-13 PROCEDURE — 83036 HEMOGLOBIN GLYCOSYLATED A1C: CPT

## 2024-11-13 PROCEDURE — 80053 COMPREHEN METABOLIC PANEL: CPT

## 2024-11-13 PROCEDURE — 85025 COMPLETE CBC W/AUTO DIFF WBC: CPT

## 2024-11-13 PROCEDURE — 83970 ASSAY OF PARATHORMONE: CPT

## 2024-11-13 PROCEDURE — 80061 LIPID PANEL: CPT

## 2024-11-13 PROCEDURE — 84443 ASSAY THYROID STIM HORMONE: CPT

## 2025-04-01 DIAGNOSIS — E11.65 TYPE 2 DIABETES MELLITUS WITH HYPERGLYCEMIA, WITHOUT LONG-TERM CURRENT USE OF INSULIN (HCC): ICD-10-CM

## 2025-05-19 ENCOUNTER — OFFICE VISIT (OUTPATIENT)
Dept: FAMILY MEDICINE CLINIC | Age: 65
End: 2025-05-19
Payer: COMMERCIAL

## 2025-05-19 VITALS
SYSTOLIC BLOOD PRESSURE: 122 MMHG | WEIGHT: 195 LBS | BODY MASS INDEX: 27.98 KG/M2 | OXYGEN SATURATION: 98 % | HEART RATE: 67 BPM | DIASTOLIC BLOOD PRESSURE: 86 MMHG

## 2025-05-19 DIAGNOSIS — E21.3 HYPERPARATHYROIDISM: ICD-10-CM

## 2025-05-19 DIAGNOSIS — J44.9 CHRONIC OBSTRUCTIVE PULMONARY DISEASE, UNSPECIFIED COPD TYPE (HCC): ICD-10-CM

## 2025-05-19 DIAGNOSIS — Z53.20 CERVICAL CANCER SCREENING DECLINED: ICD-10-CM

## 2025-05-19 DIAGNOSIS — E04.1 NODULE OF RIGHT LOBE OF THYROID GLAND: ICD-10-CM

## 2025-05-19 DIAGNOSIS — E11.65 TYPE 2 DIABETES MELLITUS WITH HYPERGLYCEMIA, WITHOUT LONG-TERM CURRENT USE OF INSULIN (HCC): ICD-10-CM

## 2025-05-19 DIAGNOSIS — R13.10 DYSPHAGIA, UNSPECIFIED TYPE: ICD-10-CM

## 2025-05-19 DIAGNOSIS — Z12.31 SCREENING MAMMOGRAM FOR BREAST CANCER: ICD-10-CM

## 2025-05-19 DIAGNOSIS — F33.41 MAJOR DEPRESSIVE DISORDER, RECURRENT, IN PARTIAL REMISSION: Primary | ICD-10-CM

## 2025-05-19 DIAGNOSIS — E78.2 MIXED HYPERLIPIDEMIA: ICD-10-CM

## 2025-05-19 LAB — HBA1C MFR BLD: 7.5 %

## 2025-05-19 PROCEDURE — 3051F HG A1C>EQUAL 7.0%<8.0%: CPT | Performed by: NURSE PRACTITIONER

## 2025-05-19 PROCEDURE — 83036 HEMOGLOBIN GLYCOSYLATED A1C: CPT | Performed by: NURSE PRACTITIONER

## 2025-05-19 PROCEDURE — 99214 OFFICE O/P EST MOD 30 MIN: CPT | Performed by: NURSE PRACTITIONER

## 2025-05-19 RX ORDER — FLUTICASONE FUROATE, UMECLIDINIUM BROMIDE AND VILANTEROL TRIFENATATE 100; 62.5; 25 UG/1; UG/1; UG/1
1 POWDER RESPIRATORY (INHALATION) DAILY
Qty: 28 EACH | Refills: 0 | Status: SHIPPED | COMMUNITY
Start: 2025-05-19

## 2025-05-19 RX ORDER — ALBUTEROL SULFATE 90 UG/1
1-2 INHALANT RESPIRATORY (INHALATION) 4 TIMES DAILY PRN
Qty: 18 G | Refills: 5 | Status: SHIPPED | OUTPATIENT
Start: 2025-05-19

## 2025-05-19 SDOH — ECONOMIC STABILITY: FOOD INSECURITY: WITHIN THE PAST 12 MONTHS, THE FOOD YOU BOUGHT JUST DIDN'T LAST AND YOU DIDN'T HAVE MONEY TO GET MORE.: NEVER TRUE

## 2025-05-19 SDOH — ECONOMIC STABILITY: FOOD INSECURITY: WITHIN THE PAST 12 MONTHS, YOU WORRIED THAT YOUR FOOD WOULD RUN OUT BEFORE YOU GOT MONEY TO BUY MORE.: NEVER TRUE

## 2025-05-19 ASSESSMENT — PATIENT HEALTH QUESTIONNAIRE - PHQ9
SUM OF ALL RESPONSES TO PHQ QUESTIONS 1-9: 2
SUM OF ALL RESPONSES TO PHQ QUESTIONS 1-9: 2
9. THOUGHTS THAT YOU WOULD BE BETTER OFF DEAD, OR OF HURTING YOURSELF: NOT AT ALL
3. TROUBLE FALLING OR STAYING ASLEEP: NOT AT ALL
1. LITTLE INTEREST OR PLEASURE IN DOING THINGS: SEVERAL DAYS
7. TROUBLE CONCENTRATING ON THINGS, SUCH AS READING THE NEWSPAPER OR WATCHING TELEVISION: NOT AT ALL
5. POOR APPETITE OR OVEREATING: NOT AT ALL
6. FEELING BAD ABOUT YOURSELF - OR THAT YOU ARE A FAILURE OR HAVE LET YOURSELF OR YOUR FAMILY DOWN: NOT AT ALL
8. MOVING OR SPEAKING SO SLOWLY THAT OTHER PEOPLE COULD HAVE NOTICED. OR THE OPPOSITE, BEING SO FIGETY OR RESTLESS THAT YOU HAVE BEEN MOVING AROUND A LOT MORE THAN USUAL: NOT AT ALL
SUM OF ALL RESPONSES TO PHQ QUESTIONS 1-9: 2
4. FEELING TIRED OR HAVING LITTLE ENERGY: NOT AT ALL
SUM OF ALL RESPONSES TO PHQ QUESTIONS 1-9: 2
10. IF YOU CHECKED OFF ANY PROBLEMS, HOW DIFFICULT HAVE THESE PROBLEMS MADE IT FOR YOU TO DO YOUR WORK, TAKE CARE OF THINGS AT HOME, OR GET ALONG WITH OTHER PEOPLE: NOT DIFFICULT AT ALL
2. FEELING DOWN, DEPRESSED OR HOPELESS: SEVERAL DAYS

## 2025-05-19 NOTE — PROGRESS NOTES
[facility]  [facility]  [address]  [address]  [facility] [address]  Dept: [phone]    5/19/2025    CHIEF COMPLAINT    Chief Complaint   Patient presents with    Diabetes       HPI    Doreen Boogie is a 64 y.o. female who presents   Chief Complaint   Patient presents with    Diabetes     HPI    Specialists:      Psychiatrist - Previously seeing Dr. Suarez, but is now seeing her NP Jaimee. Only had one appointment.   Dermatology- Dr. Dan once  Endocrinology- Dr. Kang in the past   Ophthalmology- [facility]  History of Present Illness    The patient presents for evaluation of diabetes, cholesterol management, respiratory issues, neuropathy, dysphagia, and right groin pain.    Depression well-controlled.  She continues to consult with the nurse practitioner at Dr. Suarez's office, who has recommended a series of lab tests.   Her last lab work was conducted in 11/2024.     She is currently on metformin for diabetes management but does not frequently monitor her blood glucose levels. She acknowledges a deviation from her dietary regimen and expresses a desire to return to a healthier diet.   She recently acquired new dentures, which have improved her comfort and eating ability.    Lab Results   Component Value Date    LABA1C 7.5 05/19/2025    LABA1C 6.8 (H) 11/13/2024    LABA1C 6.4 07/26/2024     Lab Results   Component Value Date     11/13/2024     She reports experiencing a persistent cough, which she attributes to the current weather conditions and allergens. She has been without Trelegy for some time due to insurance coverage issues and is seeking samples of the medication.   She also requests an HFA inhaler if it is covered by her insurance.    She has been managing her cholesterol with over-the-counter medication  (Cholester-OFF) and reports no adverse effects.  Previous history of myalgias with statins    She recently underwent an eye

## 2025-05-19 NOTE — ASSESSMENT & PLAN NOTE
Chronic, at goal (stable), continue current treatment plan. Will provide trelegy samples. Continue calling for samples as needed

## 2025-05-21 ENCOUNTER — TELEPHONE (OUTPATIENT)
Dept: FAMILY MEDICINE CLINIC | Age: 65
End: 2025-05-21

## 2025-05-27 ENCOUNTER — OFFICE VISIT (OUTPATIENT)
Age: 65
End: 2025-05-27

## 2025-05-27 VITALS
SYSTOLIC BLOOD PRESSURE: 128 MMHG | WEIGHT: 197 LBS | DIASTOLIC BLOOD PRESSURE: 77 MMHG | HEART RATE: 80 BPM | TEMPERATURE: 97.8 F | OXYGEN SATURATION: 98 % | RESPIRATION RATE: 16 BRPM | BODY MASS INDEX: 30.92 KG/M2 | HEIGHT: 67 IN

## 2025-05-27 DIAGNOSIS — B02.9 HERPES ZOSTER WITHOUT COMPLICATION: Primary | ICD-10-CM

## 2025-05-27 RX ORDER — VALACYCLOVIR HYDROCHLORIDE 1 G/1
1000 TABLET, FILM COATED ORAL 3 TIMES DAILY
Qty: 21 TABLET | Refills: 0 | Status: SHIPPED | OUTPATIENT
Start: 2025-05-27 | End: 2025-06-03

## 2025-05-27 RX ORDER — GABAPENTIN 100 MG/1
100 CAPSULE ORAL EVERY 8 HOURS PRN
Qty: 30 CAPSULE | Refills: 0 | Status: SHIPPED | OUTPATIENT
Start: 2025-05-27 | End: 2025-06-06

## 2025-05-27 ASSESSMENT — ENCOUNTER SYMPTOMS
COLOR CHANGE: 0
SHORTNESS OF BREATH: 0
COUGH: 0
EYE REDNESS: 0
VOICE CHANGE: 0
TROUBLE SWALLOWING: 0
BACK PAIN: 0
EYE PAIN: 0
DIARRHEA: 0
SORE THROAT: 0
NAUSEA: 0
CHEST TIGHTNESS: 0
CONSTIPATION: 0
VOMITING: 0
ABDOMINAL PAIN: 0

## 2025-05-27 NOTE — PROGRESS NOTES
Chief complaint(s): Rash (Rash rash x 4 days )      History of present illness :     Doreen Boogie  is a  very pleasant  64 y.o. female  patient presented to the urgent care today  for evaluation of painful rash.  Patient reports having left buttock pain for a week.  Denies any injury or fall.  Denies any recent injection.  She states she developed rash in the same painful area 4 days ago.  Most likely consistent with shingles that she had years ago on the same spot.  she has been trying over-the-counter medications with minimal relief of symptoms.  No fever . No sore throat or difficulty swallowing . No earache . No headache or dizziness . No neck stiffness or pain. No vision changes. No chest pain , shortness of breath or cough . No abdominal or back pain.  No nausea or vomiting.  No change in urination or bowel movement.  No neurovascular or motor changes in upper or lower extremities. No recent travel.  Denies starting any new medications  .  All review of systems are mentioned in the HPI otherwise unremarkable.           PAST MEDICAL HISTORY    Past Medical History:   Diagnosis Date    Adenoma of right adrenal gland     Stable 4/2019. No repeat imaging advised.     COPD (chronic obstructive pulmonary disease) (HCC)     Depression     Lymphadenopathy of left cervical region 11/21/2021    Pilonidal cyst with abscess 2/25/2021    Pneumonia 2017 & 2019    twice     Pre-diabetes     Type 2 diabetes mellitus (HCC)        SURGICAL HISTORY    Past Surgical History:   Procedure Laterality Date    CATARACT REMOVAL Bilateral     LUMBAR LAMINECTOMY  1991    TOOTH EXTRACTION  2019       CURRENT MEDICATIONS    Current Outpatient Rx   Medication Sig Dispense Refill    valACYclovir (VALTREX) 1 g tablet Take 1 tablet by mouth 3 times daily for 7 days 21 tablet 0    gabapentin (NEURONTIN) 100 MG capsule Take 1 capsule by mouth every 8 hours as needed (PAIN) for up to 10 days. 30 capsule 0    fluticasone-umeclidin-vilant

## 2025-06-26 ENCOUNTER — HOSPITAL ENCOUNTER (OUTPATIENT)
Age: 65
Discharge: HOME | End: 2025-06-26
Payer: COMMERCIAL

## 2025-06-26 ENCOUNTER — HOSPITAL ENCOUNTER (OUTPATIENT)
Dept: ULTRASOUND IMAGING | Age: 65
Discharge: HOME | End: 2025-06-28
Payer: COMMERCIAL

## 2025-06-26 ENCOUNTER — HOSPITAL ENCOUNTER (OUTPATIENT)
Dept: MAMMOGRAPHY | Age: 65
Discharge: HOME | End: 2025-06-28
Payer: COMMERCIAL

## 2025-06-26 VITALS — HEIGHT: 67 IN | WEIGHT: 197 LBS | BODY MASS INDEX: 30.92 KG/M2

## 2025-06-26 DIAGNOSIS — Z12.31 SCREENING MAMMOGRAM FOR BREAST CANCER: ICD-10-CM

## 2025-06-26 DIAGNOSIS — R13.10 DYSPHAGIA, UNSPECIFIED TYPE: ICD-10-CM

## 2025-06-26 DIAGNOSIS — E04.1 NODULE OF RIGHT LOBE OF THYROID GLAND: ICD-10-CM

## 2025-06-26 DIAGNOSIS — E11.65 TYPE 2 DIABETES MELLITUS WITH HYPERGLYCEMIA, WITHOUT LONG-TERM CURRENT USE OF INSULIN (HCC): ICD-10-CM

## 2025-06-26 DIAGNOSIS — E21.3 HYPERPARATHYROIDISM: ICD-10-CM

## 2025-06-26 LAB
25(OH)D3 SERPL-MCNC: 80.9 NG/ML (ref 30–100)
ALBUMIN SERPL-MCNC: 4.5 G/DL (ref 3.5–5.2)
ALBUMIN/GLOB SERPL: 2 {RATIO} (ref 1–2.5)
ALP SERPL-CCNC: 91 U/L (ref 35–104)
ALT SERPL-CCNC: 23 U/L (ref 10–35)
ANION GAP SERPL CALCULATED.3IONS-SCNC: 8 MMOL/L (ref 9–16)
AST SERPL-CCNC: 21 U/L (ref 10–35)
BASOPHILS # BLD: 0.04 K/UL (ref 0–0.2)
BASOPHILS NFR BLD: 1 % (ref 0–2)
BILIRUB SERPL-MCNC: 0.3 MG/DL (ref 0–1.2)
BUN SERPL-MCNC: 13 MG/DL (ref 8–23)
CA-I BLD-SCNC: 1.43 MMOL/L (ref 1.13–1.33)
CALCIUM SERPL-MCNC: 11.2 MG/DL (ref 8.6–10.4)
CHLORIDE SERPL-SCNC: 105 MMOL/L (ref 98–107)
CHOLEST SERPL-MCNC: 200 MG/DL (ref 0–199)
CHOLESTEROL/HDL RATIO: 4.1
CO2 SERPL-SCNC: 26 MMOL/L (ref 20–31)
CREAT SERPL-MCNC: 0.8 MG/DL (ref 0.6–0.9)
CREAT UR-MCNC: 25.6 MG/DL (ref 28–217)
EOSINOPHIL # BLD: 0.04 K/UL (ref 0–0.44)
EOSINOPHILS RELATIVE PERCENT: 1 % (ref 1–4)
ERYTHROCYTE [DISTWIDTH] IN BLOOD BY AUTOMATED COUNT: 12.9 % (ref 11.8–14.4)
GFR, ESTIMATED: 83 ML/MIN/1.73M2
GLUCOSE SERPL-MCNC: 129 MG/DL (ref 74–99)
HCT VFR BLD AUTO: 40.8 % (ref 36.3–47.1)
HDLC SERPL-MCNC: 49 MG/DL
HGB BLD-MCNC: 13.5 G/DL (ref 11.9–15.1)
IMM GRANULOCYTES # BLD AUTO: <0.03 K/UL (ref 0–0.3)
IMM GRANULOCYTES NFR BLD: 0 %
LDLC SERPL CALC-MCNC: 131 MG/DL (ref 0–100)
LYMPHOCYTES NFR BLD: 1.32 K/UL (ref 1.1–3.7)
LYMPHOCYTES RELATIVE PERCENT: 32 % (ref 24–43)
MCH RBC QN AUTO: 32.3 PG (ref 25.2–33.5)
MCHC RBC AUTO-ENTMCNC: 33.1 G/DL (ref 28.4–34.8)
MCV RBC AUTO: 97.6 FL (ref 82.6–102.9)
MICROALBUMIN UR-MCNC: 13 MG/L (ref 0–20)
MICROALBUMIN/CREAT UR-RTO: 51 MCG/MG CREAT (ref 0–25)
MONOCYTES NFR BLD: 0.29 K/UL (ref 0.1–1.2)
MONOCYTES NFR BLD: 7 % (ref 3–12)
NEUTROPHILS NFR BLD: 59 % (ref 36–65)
NEUTS SEG NFR BLD: 2.44 K/UL (ref 1.5–8.1)
NRBC BLD-RTO: 0 PER 100 WBC
PLATELET # BLD AUTO: 193 K/UL (ref 138–453)
PMV BLD AUTO: 10.8 FL (ref 8.1–13.5)
POTASSIUM SERPL-SCNC: 5.2 MMOL/L (ref 3.7–5.3)
PROT SERPL-MCNC: 6.8 G/DL (ref 6.6–8.7)
PTH-INTACT SERPL-MCNC: 57 PG/ML (ref 17.9–58.6)
RBC # BLD AUTO: 4.18 M/UL (ref 3.95–5.11)
SODIUM SERPL-SCNC: 139 MMOL/L (ref 136–145)
TRIGL SERPL-MCNC: 100 MG/DL
TSH SERPL DL<=0.05 MIU/L-ACNC: 1.12 UIU/ML (ref 0.27–4.2)
VIT B12 SERPL-MCNC: 385 PG/ML (ref 232–1245)
VLDLC SERPL CALC-MCNC: 20 MG/DL (ref 1–30)
WBC OTHER # BLD: 4.1 K/UL (ref 3.5–11.3)

## 2025-06-26 PROCEDURE — 82570 ASSAY OF URINE CREATININE: CPT

## 2025-06-26 PROCEDURE — 36415 COLL VENOUS BLD VENIPUNCTURE: CPT

## 2025-06-26 PROCEDURE — 77063 BREAST TOMOSYNTHESIS BI: CPT

## 2025-06-26 PROCEDURE — 80053 COMPREHEN METABOLIC PANEL: CPT

## 2025-06-26 PROCEDURE — 82043 UR ALBUMIN QUANTITATIVE: CPT

## 2025-06-26 PROCEDURE — 82330 ASSAY OF CALCIUM: CPT

## 2025-06-26 PROCEDURE — 76536 US EXAM OF HEAD AND NECK: CPT

## 2025-06-26 PROCEDURE — 83970 ASSAY OF PARATHORMONE: CPT

## 2025-06-26 PROCEDURE — 85025 COMPLETE CBC W/AUTO DIFF WBC: CPT

## 2025-06-26 PROCEDURE — 84443 ASSAY THYROID STIM HORMONE: CPT

## 2025-06-26 PROCEDURE — 82607 VITAMIN B-12: CPT

## 2025-06-26 PROCEDURE — 82306 VITAMIN D 25 HYDROXY: CPT

## 2025-06-26 PROCEDURE — 80061 LIPID PANEL: CPT

## 2025-06-30 ENCOUNTER — RESULTS FOLLOW-UP (OUTPATIENT)
Dept: FAMILY MEDICINE CLINIC | Age: 65
End: 2025-06-30

## 2025-07-28 ENCOUNTER — OFFICE VISIT (OUTPATIENT)
Dept: FAMILY MEDICINE CLINIC | Age: 65
End: 2025-07-28

## 2025-07-28 VITALS
WEIGHT: 197 LBS | DIASTOLIC BLOOD PRESSURE: 65 MMHG | BODY MASS INDEX: 30.92 KG/M2 | HEART RATE: 77 BPM | HEIGHT: 67 IN | SYSTOLIC BLOOD PRESSURE: 131 MMHG

## 2025-07-28 DIAGNOSIS — Z00.00 MEDICARE ANNUAL WELLNESS VISIT, SUBSEQUENT: Primary | ICD-10-CM

## 2025-07-28 DIAGNOSIS — J44.9 CHRONIC OBSTRUCTIVE PULMONARY DISEASE, UNSPECIFIED COPD TYPE (HCC): ICD-10-CM

## 2025-07-28 DIAGNOSIS — Z86.19 HISTORY OF HERPES ZOSTER VIRUS: ICD-10-CM

## 2025-07-28 DIAGNOSIS — F33.41 MAJOR DEPRESSIVE DISORDER, RECURRENT, IN PARTIAL REMISSION: ICD-10-CM

## 2025-07-28 DIAGNOSIS — Z53.20 CERVICAL CANCER SCREENING DECLINED: ICD-10-CM

## 2025-07-28 DIAGNOSIS — E11.65 TYPE 2 DIABETES MELLITUS WITH HYPERGLYCEMIA, WITHOUT LONG-TERM CURRENT USE OF INSULIN (HCC): ICD-10-CM

## 2025-07-28 DIAGNOSIS — Z87.891 PERSONAL HISTORY OF TOBACCO USE: ICD-10-CM

## 2025-07-28 RX ORDER — FLUTICASONE FUROATE, UMECLIDINIUM BROMIDE AND VILANTEROL TRIFENATATE 100; 62.5; 25 UG/1; UG/1; UG/1
1 POWDER RESPIRATORY (INHALATION) DAILY
Qty: 28 EACH | Refills: 0 | Status: SHIPPED | COMMUNITY
Start: 2025-07-28

## 2025-07-28 ASSESSMENT — PATIENT HEALTH QUESTIONNAIRE - PHQ9
7. TROUBLE CONCENTRATING ON THINGS, SUCH AS READING THE NEWSPAPER OR WATCHING TELEVISION: MORE THAN HALF THE DAYS
1. LITTLE INTEREST OR PLEASURE IN DOING THINGS: MORE THAN HALF THE DAYS
8. MOVING OR SPEAKING SO SLOWLY THAT OTHER PEOPLE COULD HAVE NOTICED. OR THE OPPOSITE, BEING SO FIGETY OR RESTLESS THAT YOU HAVE BEEN MOVING AROUND A LOT MORE THAN USUAL: NOT AT ALL
3. TROUBLE FALLING OR STAYING ASLEEP: NEARLY EVERY DAY
2. FEELING DOWN, DEPRESSED OR HOPELESS: MORE THAN HALF THE DAYS
SUM OF ALL RESPONSES TO PHQ QUESTIONS 1-9: 14
9. THOUGHTS THAT YOU WOULD BE BETTER OFF DEAD, OR OF HURTING YOURSELF: NOT AT ALL
6. FEELING BAD ABOUT YOURSELF - OR THAT YOU ARE A FAILURE OR HAVE LET YOURSELF OR YOUR FAMILY DOWN: MORE THAN HALF THE DAYS
SUM OF ALL RESPONSES TO PHQ QUESTIONS 1-9: 14
5. POOR APPETITE OR OVEREATING: NOT AT ALL
10. IF YOU CHECKED OFF ANY PROBLEMS, HOW DIFFICULT HAVE THESE PROBLEMS MADE IT FOR YOU TO DO YOUR WORK, TAKE CARE OF THINGS AT HOME, OR GET ALONG WITH OTHER PEOPLE: NOT DIFFICULT AT ALL
4. FEELING TIRED OR HAVING LITTLE ENERGY: NEARLY EVERY DAY

## 2025-07-28 ASSESSMENT — ENCOUNTER SYMPTOMS
TROUBLE SWALLOWING: 1
SHORTNESS OF BREATH: 1
VOMITING: 0
CONSTIPATION: 0
DIARRHEA: 0
GASTROINTESTINAL NEGATIVE: 1
COUGH: 0
ROS SKIN COMMENTS: SEE HPI
NAUSEA: 0
ABDOMINAL PAIN: 0
EYES NEGATIVE: 1
BACK PAIN: 0

## 2025-07-28 NOTE — PROGRESS NOTES
Medicare Annual Wellness Visit    Doreen Boogie is here for Medicare AWV and Diabetes (A1c done 5/19/25 - 7.5)    Assessment & Plan   Medicare annual wellness visit, subsequent  Chronic obstructive pulmonary disease, unspecified COPD type (HCC)  Assessment & Plan:   Chronic, at goal (stable), continue current treatment plan. Will provide trelegy samples. Continue calling for samples as needed  Orders:  -     fluticasone-umeclidin-vilant (TRELEGY ELLIPTA) 100-62.5-25 MCG/ACT AEPB inhaler; Inhale 1 puff into the lungs daily, Disp-28 each, R-0LOT # [id number] Expiration 9/2026 NDC: [id number]  Type 2 diabetes mellitus with hyperglycemia, without long-term current use of insulin (HCC)  Assessment & Plan:    Chronic, not at goal (unstable), continue current treatment plan and lifestyle modifications recommended  Major depressive disorder, recurrent, in partial remission  Personal history of tobacco use  -     PA VISIT TO DISCUSS LUNG CA SCREEN W LDCT  -     CT Lung Screen (Initial/Annual/Baseline); Future  Cervical cancer screening declined  History of herpes zoster virus     Assessment & Plan  1.  Dysphagia.  - The difficulty in swallowing is unlikely to be related to the thyroid nodule.  - Physical exam findings indicate no concerning issues with the thyroid nodule.  - A referral to Dr. Pizarro for a gastroenterology consultation was provided.  - She was advised to monitor for any changes and to schedule an appointment with GI if symptoms persist.    2. Chronic obstructive pulmonary disease (COPD).  - She uses albuterol at least once a week, sometimes more, depending on her activities and the weather.  - Physical exam findings indicate no acute distress related to COPD.  - She was advised to continue using albuterol as needed and to avoid excessive heat and humidity.  - Trelegy and Eliquis samples were requested for her management.    3.  Tobacco use  - Encouraged continued cessation.    4. Hypercholesterolemia.  -

## 2025-08-15 ENCOUNTER — HOSPITAL ENCOUNTER (OUTPATIENT)
Dept: CT IMAGING | Age: 65
Discharge: HOME | End: 2025-08-17
Payer: COMMERCIAL

## 2025-08-15 VITALS — WEIGHT: 197 LBS | BODY MASS INDEX: 30.92 KG/M2 | HEIGHT: 67 IN

## 2025-08-15 DIAGNOSIS — Z87.891 PERSONAL HISTORY OF TOBACCO USE: ICD-10-CM

## 2025-08-15 PROCEDURE — 71271 CT THORAX LUNG CANCER SCR C-: CPT
